# Patient Record
Sex: FEMALE | Race: WHITE | NOT HISPANIC OR LATINO | Employment: OTHER | URBAN - METROPOLITAN AREA
[De-identification: names, ages, dates, MRNs, and addresses within clinical notes are randomized per-mention and may not be internally consistent; named-entity substitution may affect disease eponyms.]

---

## 2017-01-16 ENCOUNTER — OFFICE VISIT (OUTPATIENT)
Dept: ORTHOPEDICS | Facility: CLINIC | Age: 82
End: 2017-01-16

## 2017-01-16 VITALS — BODY MASS INDEX: 23.29 KG/M2 | HEIGHT: 66 IN | WEIGHT: 144.9 LBS

## 2017-01-16 DIAGNOSIS — B35.1 DERMATOPHYTOSIS OF NAIL: Primary | ICD-10-CM

## 2017-01-16 NOTE — Clinical Note
1/16/2017       RE: Lynnette Mata  [address]  [place] [address]     Dear Colleague,    Thank you for referring your patient, Lynnette Mata, to the [facility] at [facility]. Please see a copy of my visit note below.    Date of Service: 1/16/2017    Chief Complaint:   Chief Complaint   Patient presents with     Consult     F/U Left great toe. Seen last in semptember 2016        HPI: Lynnette is a 81 year old female who presents today for further evaluation of thickened Left toenail. She has been using the Loprox cream intermittently. It was expensive. She doesn't think it has helped at all. She would like the nail trimmed today if possible. No other concerns.    Review of Systems: Denies N/V, fatigue    PMH:   Past Medical History   Diagnosis Date     Asymptomatic menopausal state age 45     Hx HRT use x 6+ years--off with [facility] study findings     Stricture and stenosis of cervix      Unspecified constipation      hx chronic     Shingles outbreak 2/14/2013     Despite immunization at age 72!          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[employer]     Social History Main Topics     Smoking status: Never Smoker      Smokeless tobacco: Never Used     Alcohol Use: 0.0 oz/week     0 Standard drinks or equivalent per week      Comment: 1 d; 3xs / week MAX,      Drug Use: No     Sexual Activity: No      Comment:  age 45     Other Topics Concern      Service No     Blood Transfusions No     Caffeine Concern No     Occupational Exposure No     Hobby Hazards No     Sleep Concern No     Stress Concern No     Weight Concern No     Special Diet No     Back  "Care No     Exercise No     Walks 3x weeks; 30 minutes     Bike Helmet No     Seat Belt No     Social History Narrative    How much exercise per week? Half hour everyday    How much calcium per day? Supplement       How much caffeine per day? 1 cup daily    How much vitamin D per day? MVI    Do you/your family wear seatbelts?  Yes    Do you/your family use safety helmets? No    Do you/your family use sunscreen? No    Do you/your family keep firearms in the home? No    Do you/your family have a smoke detector(s)? Yes        Do you feel safe in your home? Yes    Has anyone ever touched you in an unwanted manner? No     Explain     SEE ARYAN URIOSTEGUI    2/3/2014               FH:   Family History   Problem Relation Age of Onset     Myocardial Infarction Mother 74     Cancer - colorectal Maternal Aunt 68     CEREBROVASCULAR DISEASE Maternal Grandmother 74     Endocrine Disease Brother 10     Type I Diabetes Mellitus     C.A.D. Father      Psychotic Disorder Brother      bipolar     Psychotic Disorder Father 49     suicide     C.A.D. Brother      DIABETES Maternal Grandfather 65     DMII     Parkinsonism Other             Objective:   5' 6\" 144 lbs 14.4 oz  PT and DP pulses are 2/4 bilaterally. CRT is <3 seconds. Absent pedal hair.   Gross sensation is intact bilaterally.   Nails normal bilaterally, except for thickened and discolored L great toenail. No open lesions are noted. Skin is normal.    Assessment:   - Onychomycosis of L great toenail    Plan:  - Pt seen and evaluated  - Nail debrided upon consent. Small collection of serous fluid found subungually. All fluid was evacuated from beneath nail. Bacitracin and a bandaid applied to nailbed.  - Patient can continue to use the cream at her discretion. We discussed the low efficacy of loprox with thick nails. She is not interested in trying oral medication or having a nail avulsion performed. As long as the nail is short, she would like to keep it low to prevent " further fluid buildup.  - RTC as needed.          Again, thank you for allowing me to participate in the care of your patient.      Sincerely,    Efrain Barbosa DPM

## 2017-01-16 NOTE — PROGRESS NOTES
Date of Service: 1/16/2017    Chief Complaint:   Chief Complaint   Patient presents with     Consult     F/U Left great toe. Seen last in semptember 2016        HPI: Lynnette is a 81 year old female who presents today for further evaluation of thickened Left toenail. She has been using the Loprox cream intermittently. It was expensive. She doesn't think it has helped at all. She would like the nail trimmed today if possible. No other concerns.    Review of Systems: Denies N/V, fatigue    PMH:   Past Medical History   Diagnosis Date     Asymptomatic menopausal state age 45     Hx HRT use x 6+ years--off with [facility] study findings     Stricture and stenosis of cervix      Unspecified constipation      hx chronic     Shingles outbreak 2/14/2013     Despite immunization at age 72!          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[employer]     Social History Main Topics     Smoking status: Never Smoker      Smokeless tobacco: Never Used     Alcohol Use: 0.0 oz/week     0 Standard drinks or equivalent per week      Comment: 1 d; 3xs / week MAX,      Drug Use: No     Sexual Activity: No      Comment:  age 45     Other Topics Concern      Service No     Blood Transfusions No     Caffeine Concern No     Occupational Exposure No     Hobby Hazards No     Sleep Concern No     Stress Concern No     Weight Concern No     Special Diet No     Back Care No     Exercise No     Walks 3x weeks; 30 minutes     Bike Helmet No     Seat Belt No     Social History Narrative    How much exercise per week? Half hour everyday    How much calcium per day? Supplement       How much caffeine per day? 1 cup daily    How much vitamin D per day? MVI  "   Do you/your family wear seatbelts?  Yes    Do you/your family use safety helmets? No    Do you/your family use sunscreen? No    Do you/your family keep firearms in the home? No    Do you/your family have a smoke detector(s)? Yes        Do you feel safe in your home? Yes    Has anyone ever touched you in an unwanted manner? No     Explain     SEE ARYAN URIOSTEGUI    2/3/2014               FH:   Family History   Problem Relation Age of Onset     Myocardial Infarction Mother 74     Cancer - colorectal Maternal Aunt 68     CEREBROVASCULAR DISEASE Maternal Grandmother 74     Endocrine Disease Brother 10     Type I Diabetes Mellitus     C.A.D. Father      Psychotic Disorder Brother      bipolar     Psychotic Disorder Father 49     suicide     C.A.D. Brother      DIABETES Maternal Grandfather 65     DMII     Parkinsonism Other             Objective:   5' 6\" 144 lbs 14.4 oz  PT and DP pulses are 2/4 bilaterally. CRT is <3 seconds. Absent pedal hair.   Gross sensation is intact bilaterally.   Nails normal bilaterally, except for thickened and discolored L great toenail. No open lesions are noted. Skin is normal.    Assessment:   - Onychomycosis of L great toenail    Plan:  - Pt seen and evaluated  - Nail debrided upon consent. Small collection of serous fluid found subungually. All fluid was evacuated from beneath nail. Bacitracin and a bandaid applied to nailbed.  - Patient can continue to use the cream at her discretion. We discussed the low efficacy of loprox with thick nails. She is not interested in trying oral medication or having a nail avulsion performed. As long as the nail is short, she would like to keep it low to prevent further fluid buildup.  - RTC as needed.             Answers for HPI/ROS submitted by the patient on 1/16/2017   General Symptoms: No  Skin Symptoms: No  HENT Symptoms: No  EYE SYMPTOMS: No  HEART SYMPTOMS: No  LUNG SYMPTOMS: No  INTESTINAL SYMPTOMS: No  URINARY SYMPTOMS: No  GYNECOLOGIC " SYMPTOMS: No  BREAST SYMPTOMS: No  SKELETAL SYMPTOMS: No  BLOOD SYMPTOMS: No  NERVOUS SYSTEM SYMPTOMS: No  MENTAL HEALTH SYMPTOMS: No

## 2017-01-16 NOTE — NURSING NOTE
"Reason For Visit:   Chief Complaint   Patient presents with     Consult     F/U Left great toe. Seen last in semptember 2016       Primary MD: Nickolas Davenport  Ref. MD: Dr. Fitzgerald    ?  No      Ht 1.676 m (5' 6\")  Wt 65.726 kg (144 lb 14.4 oz)  BMI 23.40 kg/m2    Pain Assessment  Patient Currently in Pain: No           Current Outpatient Prescriptions   Medication     ciclopirox (LOPROX) 0.77 % cream     lisinopril (PRINIVIL,ZESTRIL) 5 MG tablet     simvastatin (ZOCOR) 10 MG tablet     Calcium Carbonate (CALCIUM 600 PO)     Glucosamine 500 MG CAPS     Multiple Vitamin (MULTI-VITAMIN) per tablet     fish oil-omega-3 fatty acids (FISH OIL) 1000 MG capsule     aspirin 81 MG chewable tablet     No current facility-administered medications for this visit.          Allergies   Allergen Reactions     Nkda [No Known Drug Allergies]        "

## 2017-05-01 ENCOUNTER — OFFICE VISIT (OUTPATIENT)
Dept: OBGYN | Facility: CLINIC | Age: 82
End: 2017-05-01
Attending: ADVANCED PRACTICE MIDWIFE
Payer: MEDICARE

## 2017-05-01 VITALS
BODY MASS INDEX: 23.45 KG/M2 | WEIGHT: 145.9 LBS | DIASTOLIC BLOOD PRESSURE: 63 MMHG | HEIGHT: 66 IN | SYSTOLIC BLOOD PRESSURE: 125 MMHG | HEART RATE: 69 BPM

## 2017-05-01 DIAGNOSIS — Z01.419 ENCOUNTER FOR GYNECOLOGICAL EXAMINATION WITHOUT ABNORMAL FINDING: ICD-10-CM

## 2017-05-01 ASSESSMENT — PAIN SCALES - GENERAL: PAINLEVEL: NO PAIN (0)

## 2017-05-01 NOTE — PROGRESS NOTES
Progress Note    SUBJECTIVE:  Lynnette Mata is an 81 year old  , who requests a breast and pelvic exam.    Patient is followed by Dr. Davenport for primary care.    Concerns today include: No concerns today. Feeling well. Has a PCP that she sees regularly. Would like breast exam and bimanual pelvic exam today.    Denies any vaginal bleeding, irritation, discomfort, change in discharge or odor. Denies urinary s/s.   Had mammogram today prior to appointment. Denies breast changes, masses or lumps.    -  has Parkinsons- living in a home. Pt tearful when discussing; states he is not doing well.   She is coping well and feels supported.    Menstrual History: Postmenopausal    Last  PAP:  NIL  History of abnormal Pap smear: NO - age 65 - see link Cervical Cytology Screening Guidelines  No further screening.    Mammogram current: yes- completed today.    Next colonoscopy when 83 y.o.    HISTORY:  Prescription Medications as of 2017             ciclopirox (LOPROX) 0.77 % cream Apply topically 2 times daily    lisinopril (PRINIVIL,ZESTRIL) 5 MG tablet Take 1 tablet (5 mg) by mouth daily    simvastatin (ZOCOR) 10 MG tablet Take 0.5 tablets (5 mg) by mouth daily    Calcium Carbonate (CALCIUM 600 PO) Take  by mouth daily.    Glucosamine 500 MG CAPS Take  by mouth daily.    Multiple Vitamin (MULTI-VITAMIN) per tablet Take 1 tablet by mouth daily.    fish oil-omega-3 fatty acids (FISH OIL) 1000 MG capsule Take 2 g by mouth daily.    aspirin 81 MG chewable tablet Take 81 mg by mouth daily.        Allergies   Allergen Reactions     Nkda [No Known Drug Allergies]      Immunization History   Administered Date(s) Administered     Influenza (H1N1) 2010     Influenza (High Dose) 3 valent vaccine 10/05/2015, 10/06/2016     Influenza (IIV3) 10/29/1997, 10/04/2010, 10/17/2011, 10/26/2012, 10/06/2013     Pneumococcal (PCV 13) 2015     Pneumococcal 23 valent 2008     TD (ADULT, 7+)  2006     TDAP Vaccine (Boostrix) 2012     Zoster vaccine, live 2007       Obstetric History       T0      TAB0   SAB0   E0   M0   L0      Past Medical History:   Diagnosis Date     Asymptomatic menopausal state age 45    Hx HRT use x 6+ years--off with  study findings     Shingles outbreak 2013    Despite immunization at age 72!        Stricture and stenosis of cervix      Unspecified constipation     hx chronic     Past Surgical History:   Procedure Laterality Date     COLONOSCOPY  4.15.09    (Fiberoptic) WNL -- ami in 10 yrs     Family History   Problem Relation Age of Onset     Myocardial Infarction Mother 74     CEREBROVASCULAR DISEASE Maternal Grandmother 74     Cancer - colorectal Maternal Aunt 68     Endocrine Disease Brother 10     Type I Diabetes Mellitus     Psychotic Disorder Brother      bipolar     C.A.D. Brother      C.A.D. 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     Social History Main Topics     Smoking status: Never Smoker     Smokeless tobacco: Never Used     Alcohol use 0.0 oz/week     0 Standard drinks or equivalent per week      Comment: 1 d; <3x week / week MAX,      Drug use: No     Sexual activity: No      Comment:  age 45     Other Topics Concern      Service No     Blood Transfusions No     Caffeine Concern No     Occupational Exposure No     Hobby Hazards No     Sleep Concern No     Stress Concern No     Weight Concern No     Special Diet No     Back Care No     Exercise No     Walks 3x weeks; 30 minutes     Bike Helmet No     Seat Belt No     Social History Narrative    How much exercise per week? Half hour  "everyday    How much calcium per day? Supplement       How much caffeine per day? 1 cup daily    How much vitamin D per day? MVI    Do you/your family wear seatbelts?  Yes    Do you/your family use safety helmets? No    Do you/your family use sunscreen? No    Do you/your family keep firearms in the home? No    Do you/your family have a smoke detector(s)? Yes        Do you feel safe in your home? Yes    Has anyone ever touched you in an unwanted manner? No     Explain     SEE ARYAN URIOSTEGUI    2/3/2014        Reviewed John KIM [facility]05/01/2017               ROS    EXAM:  Blood pressure 125/63, pulse 69, height 1.676 m (5' 5.98\"), weight 66.2 kg (145 lb 14.4 oz), not currently breastfeeding. Body mass index is 23.56 kg/(m^2).  General appearance: Pleasant female in no acute distress.     BREAST EXAM:  Breast: Without visible skin changes. No dimpling or lesions seen.   Breasts supple, non-tender with palpation, no dominant mass, nodularity, or nipple discharge noted bilaterally. Axillary nodes negative.      PELVIC EXAM:  EG/BUS: Normal genital architecture without lesions, erythema or abnormal secretions Bartholin's, Urethra, [place]'s normal   Urethral meatus: normal without lesion  Urethra: no masses, tenderness, or scarring   Bladder: no masses or tenderness   Vagina: moist, pink, rugae with odorless and physiologic discharge  Secretions  Speculum exam not performed  Cervix: closed, without lesion or CMT on bimanual exam  Uterus: anteverted,  and small, smooth, firm, mobile w/o pain  Adnexa: No masses, nodularity, tenderness; unable to palpate   Rectum:anus normal       ASSESSMENT:  Encounter Diagnosis   Name Primary?     Encounter for gynecological examination without abnormal finding       81 year old Female Pelvic and Breast Exam    PLAN:   Orders Placed This Encounter   Procedures     Pelvic and Breast Exam Procedure []       Return in one year/PRN for preventive care or problems/concerns.     Verbalized " understanding and agreement with visit plan.    LUIS Mattson, KIANM

## 2017-05-01 NOTE — LETTER
2017       RE: Lynnette Mata  [address]  [place] [address]     Dear Colleague,    Thank you for referring your patient, Lynnette Mata, to the [facility] at [facility]. Please see a copy of my visit note below.      Progress Note    SUBJECTIVE:  Lynnette Mata is an 81 year old  , who requests a breast and pelvic exam.    Patient is followed by Dr. Davenport for primary care.    Concerns today include: No concerns today. Feeling well. Has a PCP that she sees regularly. Would like breast exam and bimanual pelvic exam today.    Denies any vaginal bleeding, irritation, discomfort, change in discharge or odor. Denies urinary s/s.   Had mammogram today prior to appointment. Denies breast changes, masses or lumps.    -  has Parkinsons- living in a home. Pt tearful when discussing; states he is not doing well.   She is coping well and feels supported.    Menstrual History: Postmenopausal    Last  PAP:  NIL  History of abnormal Pap smear: NO - age 65 - see link Cervical Cytology Screening Guidelines  No further screening.    Mammogram current: yes- completed today.    Next colonoscopy when 83 y.o.    HISTORY:  Prescription Medications as of 2017             ciclopirox (LOPROX) 0.77 % cream Apply topically 2 times daily    lisinopril (PRINIVIL,ZESTRIL) 5 MG tablet Take 1 tablet (5 mg) by mouth daily    simvastatin (ZOCOR) 10 MG tablet Take 0.5 tablets (5 mg) by mouth daily    Calcium Carbonate (CALCIUM 600 PO) Take  by mouth daily.    Glucosamine 500 MG CAPS Take  by mouth daily.    Multiple Vitamin (MULTI-VITAMIN) per tablet Take 1 tablet by mouth daily.    fish oil-omega-3 fatty acids (FISH OIL) 1000 MG capsule Take 2 g by mouth daily.    aspirin 81 MG chewable tablet Take 81 mg by mouth daily.        Allergies   Allergen Reactions     Nkda [No Known Drug Allergies]      Immunization History   Administered  Date(s) Administered     Influenza (H1N1) 2010     Influenza (High Dose) 3 valent vaccine 10/05/2015, 10/06/2016     Influenza (IIV3) 10/29/1997, 10/04/2010, 10/17/2011, 10/26/2012, 10/06/2013     Pneumococcal (PCV 13) 2015     Pneumococcal 23 valent 2008     TD (ADULT, 7+) 2006     TDAP Vaccine (Boostrix) 2012     Zoster vaccine, live 2007       Obstetric History       T0      TAB0   SAB0   E0   M0   L0      Past Medical History:   Diagnosis Date     Asymptomatic menopausal state age 45    Hx HRT use x 6+ years--off with  study findings     Shingles outbreak 2013    Despite immunization at age 72!        Stricture and stenosis of cervix      Unspecified constipation     hx chronic     Past Surgical History:   Procedure Laterality Date     COLONOSCOPY  4.15.09    (Fiberoptic) WNL -- ami in 10 yrs     Family History   Problem Relation Age of Onset     Myocardial Infarction Mother 74     CEREBROVASCULAR DISEASE Maternal Grandmother 74     Cancer - colorectal Maternal Aunt 68     Endocrine Disease Brother 10     Type I Diabetes Mellitus     Psychotic Disorder Brother      bipolar     C.A.D. Brother      C.A.D. 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[employer]- , taaught childrens literature     Social History Main Topics     Smoking status: Never Smoker     Smokeless tobacco: Never Used     Alcohol use 0.0 oz/week     0 Standard drinks or equivalent per week      Comment: 1 d; <3x week / week MAX,      Drug use: No     Sexual activity: No      Comment:  age 45     Other Topics Concern      Service No     Blood Transfusions No     Caffeine Concern No      "Occupational Exposure No     Hobby Hazards No     Sleep Concern No     Stress Concern No     Weight Concern No     Special Diet No     Back Care No     Exercise No     Walks 3x weeks; 30 minutes     Bike Helmet No     Seat Belt No     Social History Narrative    How much exercise per week? Half hour everyday    How much calcium per day? Supplement       How much caffeine per day? 1 cup daily    How much vitamin D per day? MVI    Do you/your family wear seatbelts?  Yes    Do you/your family use safety helmets? No    Do you/your family use sunscreen? No    Do you/your family keep firearms in the home? No    Do you/your family have a smoke detector(s)? Yes        Do you feel safe in your home? Yes    Has anyone ever touched you in an unwanted manner? No     Explain     SEE ARYAN URIOSTEGUI    2/3/2014        Reviewed John KIM [facility]05/01/2017               ROS    EXAM:  Blood pressure 125/63, pulse 69, height 1.676 m (5' 5.98\"), weight 66.2 kg (145 lb 14.4 oz), not currently breastfeeding. Body mass index is 23.56 kg/(m^2).  General appearance: Pleasant female in no acute distress.     BREAST EXAM:  Breast: Without visible skin changes. No dimpling or lesions seen.   Breasts supple, non-tender with palpation, no dominant mass, nodularity, or nipple discharge noted bilaterally. Axillary nodes negative.      PELVIC EXAM:  EG/BUS: Normal genital architecture without lesions, erythema or abnormal secretions Bartholin's, Urethra, [place]'s normal   Urethral meatus: normal without lesion  Urethra: no masses, tenderness, or scarring   Bladder: no masses or tenderness   Vagina: moist, pink, rugae with odorless and physiologic discharge  Secretions  Speculum exam not performed  Cervix: closed, without lesion or CMT on bimanual exam  Uterus: anteverted,  and small, smooth, firm, mobile w/o pain  Adnexa: No masses, nodularity, tenderness; unable to palpate   Rectum:anus normal       ASSESSMENT:  Encounter Diagnosis   Name Primary?     " Encounter for gynecological examination without abnormal finding       81 year old Female Pelvic and Breast Exam    PLAN:   Orders Placed This Encounter   Procedures     Pelvic and Breast Exam Procedure []       Return in one year/PRN for preventive care or problems/concerns.     Verbalized understanding and agreement with visit plan.    Jaylen Rudolph, LUIS, CNM

## 2017-05-01 NOTE — MR AVS SNAPSHOT
After Visit Summary   5/1/2017    Lynnette Mata    MRN: [id number]           Patient Information     Date Of Birth          1935        Visit Information        Provider Department      5/1/2017 1:00 PM Jaylen Rudolph CNM [facility]        Today's Diagnoses     Encounter for gynecological examination without abnormal finding           Follow-ups after your visit        Follow-up notes from your care team     Return in 1 year (on 5/1/2018) for Preventative Health Visit.      Your next 10 appointments already scheduled     Aug 02, 2017  1:55 PM CDT   (Arrive by 1:40 PM)   PHYSICAL with Nickolas Davenport MD   [facility] ([facility] [place])    [address] [place] [address] [phone]              Who to contact     Please call your clinic at [phone] to:    Ask questions about your health    Make or cancel appointments    Discuss your medicines    Learn about your test results    Speak to your doctor   If you have compliments or concerns about an experience at your clinic, or if you wish to file a complaint, please contact [facility] Patient Relations at [phone] or email us at [email or web address]         Additional Information About Your Visit        MyChart Information     [employer] gives you secure access to your electronic health record. If you see a primary care provider, you can also send messages to your care team and make appointments. If you have questions, please call your primary care clinic.  If you do not have a primary care provider, please call [phone] and they will assist you.      [employer] is an electronic gateway that provides easy, online access to your medical records. With [employer], you can request a clinic appointment, read your test results, renew a prescription or communicate with your care team.     To access  "your existing account, please contact your [facility] or call [phone] for assistance.        Care EveryWhere ID     This is your Care EveryWhere ID. This could be used by other organizations to access your [place] medical records  [id number]        Your Vitals Were     Pulse Height BMI (Body Mass Index)             69 1.676 m (5' 5.98\") 23.56 kg/m2          Blood Pressure from Last 3 Encounters:   05/01/17 125/63   12/20/16 157/70   12/06/16 147/75    Weight from Last 3 Encounters:   05/01/17 66.2 kg (145 lb 14.4 oz)   01/16/17 65.7 kg (144 lb 14.4 oz)   12/06/16 66.5 kg (146 lb 11.2 oz)              We Performed the Following     Pelvic and Breast Exam Procedure []        Primary Care Provider Office Phone # Fax #    Nickolas Jose Francisco Davenport [phone]        PHYSICIANS [address] [facility] [address]  [facility] [place] [address]        Thank you!     Thank you for choosing [facility]  for your care. Our goal is always to provide you with excellent care. Hearing back from our patients is one way we can continue to improve our services. Please take a few minutes to complete the written survey that you may receive in the mail after your visit with us. Thank you!             Your Updated Medication List - Protect others around you: Learn how to safely use, store and throw away your medicines at www.disposemymeds.org.          This list is accurate as of: 5/1/17  1:53 PM.  Always use your most recent med list.                   Brand Name Dispense Instructions for use    aspirin 81 MG chewable tablet      Take 81 mg by mouth daily.       CALCIUM 600 PO      Take  by mouth daily.       ciclopirox 0.77 % cream    LOPROX    90 g    Apply topically 2 times daily       fish oil-omega-3 fatty acids 1000 MG capsule      Take 2 g by mouth daily.       glucosamine 500 MG Caps      Take  by mouth daily.       lisinopril 5 MG tablet    PRINIVIL/ZESTRIL    90 " tablet    Take 1 tablet (5 mg) by mouth daily       Multi-vitamin Tabs tablet   Generic drug:  multivitamin, therapeutic with minerals      Take 1 tablet by mouth daily.       simvastatin 10 MG tablet    ZOCOR    45 tablet    Take 0.5 tablets (5 mg) by mouth daily

## 2017-06-27 ENCOUNTER — OFFICE VISIT (OUTPATIENT)
Dept: ORTHOPEDICS | Facility: CLINIC | Age: 82
End: 2017-06-27

## 2017-06-27 DIAGNOSIS — B35.1 DERMATOPHYTOSIS OF NAIL: Primary | ICD-10-CM

## 2017-06-27 DIAGNOSIS — I73.9 PVD (PERIPHERAL VASCULAR DISEASE) (H): ICD-10-CM

## 2017-06-27 NOTE — MR AVS SNAPSHOT
After Visit Summary   6/27/2017    Lynnette Mata    MRN: [id number]           Patient Information     Date Of Birth          1935        Visit Information        Provider Department      6/27/2017 4:40 PM Efrain Barbosa DPM [facility]        Today's Diagnoses     Dermatophytosis of nail    -  1    PVD (peripheral vascular disease) (H)           Follow-ups after your visit        Follow-up notes from your care team     Return in about 6 months (around 12/27/2017).      Your next 10 appointments already scheduled     Aug 02, 2017  1:55 PM CDT   (Arrive by 1:40 PM)   PHYSICAL with Nickolas Davenport MD   [facility] ([facility] [place])    [address] [address] [phone]              Who to contact     Please call your clinic at [phone] to:    Ask questions about your health    Make or cancel appointments    Discuss your medicines    Learn about your test results    Speak to your doctor   If you have compliments or concerns about an experience at your clinic, or if you wish to file a complaint, please contact [facility] Patient Relations at [phone] or email us at [email or web address]         Additional Information About Your Visit        MyChart Information     [employer] gives you secure access to your electronic health record. If you see a primary care provider, you can also send messages to your care team and make appointments. If you have questions, please call your primary care clinic.  If you do not have a primary care provider, please call [phone] and they will assist you.      [employer] is an electronic gateway that provides easy, online access to your medical records. With [employer], you can request a clinic appointment, read your test results, renew a prescription or communicate with your care team.     To access your existing account,  please contact your [facility] or call [phone] for assistance.        Care EveryWhere ID     This is your Care EveryWhere ID. This could be used by other organizations to access your [place] medical records  [id number]         Blood Pressure from Last 3 Encounters:   05/01/17 125/63   12/20/16 157/70   12/06/16 147/75    Weight from Last 3 Encounters:   05/01/17 66.2 kg (145 lb 14.4 oz)   01/16/17 65.7 kg (144 lb 14.4 oz)   12/06/16 66.5 kg (146 lb 11.2 oz)              We Performed the Following     DEBRIDEMENT OF NAIL(S), 1-5        Primary Care Provider Office Phone # Fax #    Nickolas Jose Francisco Davenport [phone]        PHYSICIANS [address] [place] [address]        Equal Access to Services     WENDIE [facility] HospitalLAZARA : Hadii aad ku hadasho Soakin, waaxda luqadaha, qaybta kaalmada adeegyada, katia lopez hayphuong alonzo . So [facility] [phone].    ATENCIÓN: Si habla español, tiene a calabrese disposición servicios gratuitos de asistencia lingüística. Andria al [phone].    We comply with applicable federal civil rights laws and [place] laws. We do not discriminate on the basis of race, color, national origin, age, disability sex, sexual orientation or gender identity.            Thank you!     Thank you for choosing [facility]  for your care. Our goal is always to provide you with excellent care. Hearing back from our patients is one way we can continue to improve our services. Please take a few minutes to complete the written survey that you may receive in the mail after your visit with us. Thank you!             Your Updated Medication List - Protect others around you: Learn how to safely use, store and throw away your medicines at www.disposemymeds.org.          This list is accurate as of: 6/27/17  5:31 PM.  Always use your most recent med list.                   Brand Name Dispense Instructions for use Diagnosis     aspirin 81 MG chewable tablet      Take 81 mg by mouth daily.        CALCIUM 600 PO      Take  by mouth daily.        ciclopirox 0.77 % cream    LOPROX    90 g    Apply topically 2 times daily    Dermatophytosis of nail       fish oil-omega-3 fatty acids 1000 MG capsule      Take 2 g by mouth daily.        glucosamine 500 MG Caps      Take  by mouth daily.        lisinopril 5 MG tablet    PRINIVIL/ZESTRIL    90 tablet    Take 1 tablet (5 mg) by mouth daily    Essential hypertension, benign       Multi-vitamin Tabs tablet   Generic drug:  multivitamin, therapeutic with minerals      Take 1 tablet by mouth daily.        simvastatin 10 MG tablet    ZOCOR    45 tablet    Take 0.5 tablets (5 mg) by mouth daily    Hyperlipidemia

## 2017-06-27 NOTE — LETTER
6/27/2017       RE: Lynnette Mata  [address] [place] [address]     Dear Colleague,    Thank you for referring your patient, Lynnette Mata, to the [facility] at [facility]. Please see a copy of my visit note below.    Chief Complaint:   Chief Complaint   Patient presents with     RECHECK     Left great toenail. Pt stated that she has not been using the cream.           Allergies   Allergen Reactions     Nkda [No Known Drug Allergies]          Subjective: Lynnette is a 81 year old female who presents to the clinic today for a follow up of left big toenail thickness. She relates that she would still like the nail ground down. She relates pain when wearing tight shoes, so she wears comfortable ones instead.     Objective    PT and DP pulses are 1/4 bilaterally. CRT is <3 seconds. Absent pedal hair.   Gross sensation is intact bilaterally.   Nails normal bilaterally, except for thickened and discolored L great toenail. No open lesions are noted. Skin is normal.    Assessment: Onychomycosis x 1  PVD    Plan:   - Pt seen and evaluated  - Left hallux nail was sharply debrided.  - Pt to return to clinic PRN    Again, thank you for allowing me to participate in the care of your patient.      Sincerely,    Efrain Barbosa DPM

## 2017-06-27 NOTE — NURSING NOTE
Reason For Visit:   Chief Complaint   Patient presents with     RECHECK     Left great toenail. Pt stated that she has not been using the cream.        Pain Assessment  Patient Currently in Pain: Denies          Current Outpatient Prescriptions   Medication Sig Dispense Refill     ciclopirox (LOPROX) 0.77 % cream Apply topically 2 times daily (Patient not taking: Reported on 5/1/2017) 90 g 6     lisinopril (PRINIVIL,ZESTRIL) 5 MG tablet Take 1 tablet (5 mg) by mouth daily 90 tablet 3     simvastatin (ZOCOR) 10 MG tablet Take 0.5 tablets (5 mg) by mouth daily 45 tablet 3     Calcium Carbonate (CALCIUM 600 PO) Take  by mouth daily.       Glucosamine 500 MG CAPS Take  by mouth daily.       Multiple Vitamin (MULTI-VITAMIN) per tablet Take 1 tablet by mouth daily.       fish oil-omega-3 fatty acids (FISH OIL) 1000 MG capsule Take 2 g by mouth daily.       aspirin 81 MG chewable tablet Take 81 mg by mouth daily.            Allergies   Allergen Reactions     Nkda [No Known Drug Allergies]

## 2017-06-27 NOTE — PROGRESS NOTES
Chief Complaint:   Chief Complaint   Patient presents with     RECHECK     Left great toenail. Pt stated that she has not been using the cream.           Allergies   Allergen Reactions     Nkda [No Known Drug Allergies]          Subjective: Lynnette is a 81 year old female who presents to the clinic today for a follow up of left big toenail thickness. She relates that she would still like the nail ground down. She relates pain when wearing tight shoes, so she wears comfortable ones instead.     Objective    PT and DP pulses are 1/4 bilaterally. CRT is <3 seconds. Absent pedal hair.   Gross sensation is intact bilaterally.   Nails normal bilaterally, except for thickened and discolored L great toenail. No open lesions are noted. Skin is normal.    Assessment: Onychomycosis x 1  PVD    Plan:   - Pt seen and evaluated  - Left hallux nail was sharply debrided.  - Pt to return to clinic PRN

## 2017-08-02 ENCOUNTER — OFFICE VISIT (OUTPATIENT)
Dept: INTERNAL MEDICINE | Facility: CLINIC | Age: 82
End: 2017-08-02

## 2017-08-02 VITALS
WEIGHT: 148 LBS | RESPIRATION RATE: 16 BRPM | DIASTOLIC BLOOD PRESSURE: 74 MMHG | SYSTOLIC BLOOD PRESSURE: 119 MMHG | BODY MASS INDEX: 23.9 KG/M2 | HEART RATE: 59 BPM

## 2017-08-02 DIAGNOSIS — Z71.84 TRAVEL ADVICE ENCOUNTER: Primary | ICD-10-CM

## 2017-08-02 DIAGNOSIS — Z00.00 ROUTINE GENERAL MEDICAL EXAMINATION AT A HEALTH CARE FACILITY: ICD-10-CM

## 2017-08-02 DIAGNOSIS — E78.5 HYPERLIPIDEMIA LDL GOAL <100: ICD-10-CM

## 2017-08-02 DIAGNOSIS — I10 BENIGN ESSENTIAL HYPERTENSION: ICD-10-CM

## 2017-08-02 RX ORDER — ATOVAQUONE AND PROGUANIL HYDROCHLORIDE 250; 100 MG/1; MG/1
1 TABLET, FILM COATED ORAL DAILY
Qty: 30 TABLET | Refills: 0 | Status: SHIPPED | OUTPATIENT
Start: 2017-08-02 | End: 2017-09-01

## 2017-08-02 ASSESSMENT — PAIN SCALES - GENERAL: PAINLEVEL: NO PAIN (0)

## 2017-08-02 NOTE — PATIENT INSTRUCTIONS
Primary Care Center Medication Refill Request Information:  * Please contact your pharmacy regarding ANY request for medication refills.  ** [facility] Prescription Fax = [phone]  * Please allow 3 business days for routine medication refills.  * Please allow 5 business days for controlled substance medication refills.     Primary Care Center Test Result notification information:  *You will be notified within 7-10 days of your appointment day regarding the results of your test.  If you are on MyChart you will be notified as soon as the provider has reviewed the results and signed off on them.

## 2017-08-02 NOTE — NURSING NOTE
Chief Complaint   Patient presents with     Physical     Pt is here for a physical.      Lyudmila Wise LPN August 2, 2017 2:03 PM

## 2017-08-02 NOTE — MR AVS SNAPSHOT
After Visit Summary   8/2/2017    Lynnette Mata    MRN: [id number]           Patient Information     Date Of Birth          1935        Visit Information        Provider Department      8/2/2017 1:55 PM Nickolas Davenport MD [facility]        Today's Diagnoses     Travel advice encounter    -  1    Routine general medical examination at a health care facility        Hyperlipidemia LDL goal <100        Benign essential hypertension          Care Instructions    Primary Care Center Medication Refill Request Information:  * Please contact your pharmacy regarding ANY request for medication refills.  ** [facility] Prescription Fax = [phone]  * Please allow 3 business days for routine medication refills.  * Please allow 5 business days for controlled substance medication refills.     Primary Care Center Test Result notification information:  *You will be notified within 7-10 days of your appointment day regarding the results of your test.  If you are on MyChart you will be notified as soon as the provider has reviewed the results and signed off on them.            Follow-ups after your visit        Who to contact     Please call your clinic at [phone] to:    Ask questions about your health    Make or cancel appointments    Discuss your medicines    Learn about your test results    Speak to your doctor   If you have compliments or concerns about an experience at your clinic, or if you wish to file a complaint, please contact [facility] Patient Relations at [phone] or email us at [email or web address]         Additional Information About Your Visit        MyChart Information     [email or web address] gives you secure access to your electronic health record. If you see a primary care provider, you can also send messages to your care team and make appointments. If you have questions, please call your primary care clinic.  If you do not have a  primary care provider, please call [phone] and they will assist you.      [employer] is an electronic gateway that provides easy, online access to your medical records. With [employer] you can request a clinic appointment, read your test results, renew a prescription or communicate with your care team.     To access your existing account, please contact your [facility] or call [phone] for assistance.        Care EveryWhere ID     This is your Care EveryWhere ID. This could be used by other organizations to access your [place] medical records  [id number]        Your Vitals Were     Pulse Respirations BMI (Body Mass Index)             59 16 23.9 kg/m2          Blood Pressure from Last 3 Encounters:   No data found for BP    Weight from Last 3 Encounters:   No data found for Wt              Today, you had the following     No orders found for display         Today's Medication Changes          These changes are accurate as of: 8/2/17 11:59 PM.  If you have any questions, ask your nurse or doctor.               Start taking these medicines.        Dose/Directions    atovaquone-proguanil 250-100 MG per tablet   Commonly known as:  MALARONE   Used for:  Travel advice encounter   Started by:  Nickolas Davenport MD        Dose:  1 tablet   Take 1 tablet by mouth daily Start 2 days before travel and continue 7 days after return.   Quantity:  30 tablet   Refills:  0            Where to get your medicines      These medications were sent to [facility] [address] [place] [address] AT [address] [address]     Phone:  [phone]     atovaquone-proguanil 250-100 MG per tablet                Primary Care Provider Office Phone # Fax #    Nickolas Davenport [phone]       [address] [place] [address]  [facility] [address]        Equal Access to Services     WENDIE BRAUN AH: joshua Nesbitt  jaelkuldip maldonado waxay idiin hayaan adeeg kharash la'aan ah. So [facility] [phone].    ATENCIÓN: Si denise savage, tiene a calabrese disposición servicios gratuitos de asistencia lingüística. Andria al [phone].    We comply with applicable federal civil rights laws and [place] laws. We do not discriminate on the basis of race, color, national origin, age, disability sex, sexual orientation or gender identity.            Thank you!     Thank you for choosing [facility]  for your care. Our goal is always to provide you with excellent care. Hearing back from our patients is one way we can continue to improve our services. Please take a few minutes to complete the written survey that you may receive in the mail after your visit with us. Thank you!             Your Updated Medication List - Protect others around you: Learn how to safely use, store and throw away your medicines at www.disposemymeds.org.          This list is accurate as of: 8/2/17 11:59 PM.  Always use your most recent med list.                   Brand Name Dispense Instructions for use Diagnosis    aspirin 81 MG chewable tablet      Take 81 mg by mouth daily.        atovaquone-proguanil 250-100 MG per tablet    MALARONE    30 tablet    Take 1 tablet by mouth daily Start 2 days before travel and continue 7 days after return.    Travel advice encounter       CALCIUM 600 PO      Take  by mouth daily.        ciclopirox 0.77 % cream    LOPROX    90 g    Apply topically 2 times daily    Dermatophytosis of nail       fish oil-omega-3 fatty acids 1000 MG capsule      Take 2 g by mouth daily.        glucosamine 500 MG Caps      Take  by mouth daily.        Multi-vitamin Tabs tablet   Generic drug:  multivitamin, therapeutic with minerals      Take 1 tablet by mouth daily.

## 2017-08-02 NOTE — PROGRESS NOTES
SUBJECTIVE:  Lynnette Mata is a 81 year old female presents for   Chief Complaint   Patient presents with     Physical     Pt is here for a physical.       Ms. Mata is doing well. She lost her  this May. He had Parkinson's with Lewy Body dementia. Through the grieving process. Had ED evalutation for chest pain and subsequent CT angiogram showed a score of 0- lowest risk for age. Walking daily without problems. [place]- mainly in [place] but will go below 8200 ft which carries malaria risk. NOT during meningitis season. Some Yellow fever risk. Hep A endemic as is Typhoid. Will not have full immunity after vaccinations given timing of trip but better now than none at all. DIscussed this.     Medications and allergies were reviewed by me today.     Review Of Systems    Constitutional: no fevers, chills, night sweats or unintentional weight change   Eyes: no vision change, diplopia or red eyes   Ears, Nose, Mouth, Throat: no tinnitus or hearing change, no epistaxis or nasal discharge, no oral lesions, throat clear   Cardiovascular: no chest pain, palpitations, or pain with walking, no orthopnea or PND   Respiratory: no dyspnea, cough, shortness of breath or wheezing   GI: no nausea, vomiting, diarrhea or constipation, no abdominal pain   : no change in urine, no dysuria or hematuria  Musculoskeletal: no joint or muscle pain or swelling   Integumentary: no concerning lesions or moles   Neuro: no loss of strength or sensation, no numbness or tingling, no tremor, no dizziness, no headache   Endo: no polyuria or polydipsia, no temperature intolerance   Heme/Lymph: no concerning bumps, no bleeding problems   Allergy: no environmental allergies   Psych: no depression or anxiety, no sleep problems    Current Outpatient Prescriptions   Medication     atovaquone-proguanil (MALARONE) 250-100 MG per tablet     ciclopirox (LOPROX) 0.77 % cream     lisinopril (PRINIVIL,ZESTRIL)  5 MG tablet     simvastatin (ZOCOR) 10 MG tablet     Calcium Carbonate (CALCIUM 600 PO)     Glucosamine 500 MG CAPS     Multiple Vitamin (MULTI-VITAMIN) per tablet     fish oil-omega-3 fatty acids (FISH OIL) 1000 MG capsule     aspirin 81 MG chewable tablet     No current facility-administered medications for this visit.      Family history reviewed and not contributory/pertinent today given her wellness and age  SH- no tobacco, recent loss of her , Rare glass of wine.     PHYSICAL EXAM:  /68  Pulse 59  Resp 16  Wt 67.1 kg (148 lb)  BMI 23.9 kg/m2      Constitutional: no distress, comfortable, pleasant   Eyes: anicteric, normal extra-ocular movements   Ears, Nose and Throat: tympanic membranes clear, nose clear and free of lesions, throat clear, neck supple with full range of motion, no thyromegaly.   Cardiovascular: regular rate and rhythm, normal S1 and S2, no murmurs, rubs or gallops, peripheral pulses full and symmetric   Respiratory: clear to auscultation, no wheezes or crackles, normal breath sounds   Gastrointestinal: positive bowel sounds, nontender, no hepatosplenomegaly, no masses   Musculoskeletal: full range of motion, no edema   Skin: no concerning lesions, no jaundice   Neurological: nl coordination and mentation, normal gait, no tremor   Psychological: appropriate mood   Lymphatic: no cervical, axillary or inguinal lymphadenopathy      ASSESSMENT/PLAN:  1) HCM. Gyn health UTD- no more paps, mammo nl. Routine immunizations UTD but needs travel shots. Excellent functional status. Low fall risk.   2) Travel. Late in getting evaluated. Will be in [place] [place] but will also travel below 8200 ft outside town so will need malarial prophylaxis with malarone (instructions given). Yellow Fever vaccine recommended but shortage noted- she will call Lissy to see if available. Will get Typhoid and Hep A vaccine as well. Handout from [facility] website given.   3) Hyperlipidemia. Calcium  score 0 so 10 yr ascvd risk low. No SEs on statin low dose so will not change. Lipids ordered.  4) HTN. BP elevated today- will recheck using 3 measurement protocol and continue lisinopril 5 mg/d. NO change given current guidelines targeting <150/90 in her age group.       Nickolas Davenport MD, FACP, FAAP

## 2017-08-03 DIAGNOSIS — Z00.00 ROUTINE GENERAL MEDICAL EXAMINATION AT A HEALTH CARE FACILITY: ICD-10-CM

## 2017-08-03 DIAGNOSIS — I10 BENIGN ESSENTIAL HYPERTENSION: ICD-10-CM

## 2017-08-03 DIAGNOSIS — E78.5 HYPERLIPIDEMIA LDL GOAL <100: ICD-10-CM

## 2017-08-03 LAB
ALBUMIN SERPL-MCNC: 3.4 G/DL (ref 3.4–5)
ALP SERPL-CCNC: 41 U/L (ref 40–150)
ALT SERPL W P-5'-P-CCNC: 26 U/L (ref 0–50)
ANION GAP SERPL CALCULATED.3IONS-SCNC: 8 MMOL/L (ref 3–14)
AST SERPL W P-5'-P-CCNC: 17 U/L (ref 0–45)
BILIRUB SERPL-MCNC: 0.5 MG/DL (ref 0.2–1.3)
BUN SERPL-MCNC: 20 MG/DL (ref 7–30)
CALCIUM SERPL-MCNC: 8.8 MG/DL (ref 8.5–10.1)
CHLORIDE SERPL-SCNC: 105 MMOL/L (ref 94–109)
CHOLEST SERPL-MCNC: 200 MG/DL
CO2 SERPL-SCNC: 26 MMOL/L (ref 20–32)
CREAT SERPL-MCNC: 0.7 MG/DL (ref 0.52–1.04)
GFR SERPL CREATININE-BSD FRML MDRD: 81 ML/MIN/1.7M2
GLUCOSE SERPL-MCNC: 96 MG/DL (ref 70–99)
HDLC SERPL-MCNC: 101 MG/DL
LDLC SERPL CALC-MCNC: 87 MG/DL
NONHDLC SERPL-MCNC: 99 MG/DL
POTASSIUM SERPL-SCNC: 4.1 MMOL/L (ref 3.4–5.3)
PROT SERPL-MCNC: 6.8 G/DL (ref 6.8–8.8)
SODIUM SERPL-SCNC: 139 MMOL/L (ref 133–144)
TRIGL SERPL-MCNC: 62 MG/DL
TSH SERPL DL<=0.005 MIU/L-ACNC: 2.93 MU/L (ref 0.4–4)

## 2017-08-08 DIAGNOSIS — I10 ESSENTIAL HYPERTENSION, BENIGN: ICD-10-CM

## 2017-08-08 DIAGNOSIS — E78.5 HYPERLIPIDEMIA: ICD-10-CM

## 2017-08-08 RX ORDER — LISINOPRIL 5 MG/1
5 TABLET ORAL DAILY
Qty: 90 TABLET | Refills: 3 | Status: SHIPPED | OUTPATIENT
Start: 2017-08-08 | End: 2018-02-26

## 2017-08-08 RX ORDER — SIMVASTATIN 10 MG
5 TABLET ORAL DAILY
Qty: 45 TABLET | Refills: 3 | Status: SHIPPED | OUTPATIENT
Start: 2017-08-08 | End: 2017-10-04

## 2017-08-08 NOTE — TELEPHONE ENCOUNTER
lisinopril (PRINIVIL,ZESTRIL) 5 MG tablet      Last Written Prescription Date:  8/9/2016  Last Fill Quantity: 90,   # refills: 3    Potassium   Date Value Ref Range Status   08/03/2017 4.1 3.4 - 5.3 mmol/L Final   ]  Creatinine   Date Value Ref Range Status   08/03/2017 0.70 0.52 - 1.04 mg/dL Final   ]  BP Readings from Last 1 Encounters:   08/02/17 119/74       simvastatin (ZOCOR) 10 MG tablet      Last Written Prescription Date:  7/20/2016  Last Fill Quantity: 45,   # refills: 3      Last Office Visit : 8/2/2017  Future Office visit:  0

## 2017-09-08 ENCOUNTER — OFFICE VISIT (OUTPATIENT)
Dept: FAMILY MEDICINE | Facility: CLINIC | Age: 82
End: 2017-09-08

## 2017-09-08 VITALS
SYSTOLIC BLOOD PRESSURE: 129 MMHG | HEIGHT: 67 IN | OXYGEN SATURATION: 95 % | HEART RATE: 73 BPM | DIASTOLIC BLOOD PRESSURE: 74 MMHG | WEIGHT: 145 LBS | BODY MASS INDEX: 22.76 KG/M2 | TEMPERATURE: 98.2 F

## 2017-09-08 DIAGNOSIS — N30.01 ACUTE CYSTITIS WITH HEMATURIA: Primary | ICD-10-CM

## 2017-09-08 DIAGNOSIS — R30.0 DYSURIA: ICD-10-CM

## 2017-09-08 RX ORDER — SULFAMETHOXAZOLE/TRIMETHOPRIM 800-160 MG
1 TABLET ORAL 2 TIMES DAILY
Qty: 14 TABLET | Refills: 0 | Status: SHIPPED | OUTPATIENT
Start: 2017-09-08 | End: 2017-10-04

## 2017-09-08 RX ORDER — SULFAMETHOXAZOLE/TRIMETHOPRIM 800-160 MG
1 TABLET ORAL 2 TIMES DAILY
Qty: 14 TABLET | Refills: 0 | Status: CANCELLED | OUTPATIENT
Start: 2017-09-08

## 2017-09-08 ASSESSMENT — PAIN SCALES - GENERAL: PAINLEVEL: NO PAIN (0)

## 2017-09-08 NOTE — MR AVS SNAPSHOT
"              After Visit Summary   9/8/2017    Lynnette Mata    MRN: [id number]           Patient Information     Date Of Birth          1935        Visit Information        Provider Department      9/8/2017 9:00 AM Elizabeth Panchal NP [facility]        Today's Diagnoses     Urinary tract infection    -  1    Dysuria           Follow-ups after your visit        Who to contact     Please call your clinic at [phone] to:    Ask questions about your health    Make or cancel appointments    Discuss your medicines    Learn about your test results    Speak to your doctor   If you have compliments or concerns about an experience at your clinic, or if you wish to file a complaint, please contact [facility] Patient Relations at [phone] or email us at [email or web address]         Additional Information About Your Visit        MyChart Information     SmartSyncht gives you secure access to your electronic health record. If you see a primary care provider, you can also send messages to your care team and make appointments. If you have questions, please call your primary care clinic.  If you do not have a primary care provider, please call [phone] and they will assist you.      Youneeq is an electronic gateway that provides easy, online access to your medical records. With Youneeq, you can request a clinic appointment, read your test results, renew a prescription or communicate with your care team.     To access your existing account, please contact your [facility] or call [phone] for assistance.        Care EveryWhere ID     This is your Care EveryWhere ID. This could be used by other organizations to access your [place] medical records  [id number]        Your Vitals Were     Pulse Temperature Height Pulse Oximetry Breastfeeding? BMI (Body Mass Index)    73 98.2  F (36.8  C) (Oral) 5' 6.7\" (169.4 cm) 95% No 22.91 kg/m2 "       Blood Pressure from Last 3 Encounters:   09/08/17 129/74   08/02/17 119/74   05/01/17 125/63    Weight from Last 3 Encounters:   09/08/17 145 lb (65.8 kg)   08/02/17 148 lb (67.1 kg)   05/01/17 145 lb 14.4 oz (66.2 kg)              Today, you had the following     No orders found for display       Primary Care Provider Office Phone # Fax Vidhya Olmos Jose Francisco Davenport [phone]       [address] [place] [address]        Equal Access to Services     [facility]: Hadii sal nuñez hadasho Soakin, waaxda luqadaha, qaybta kaalmada azam, katia alonzo . So [facility] [phone].    ATENCIÓN: Si habla español, tiene a calabrese disposición servicios gratuitos de asistencia lingüística. [facility] [phone].    We comply with applicable federal civil rights laws and [place] laws. We do not discriminate on the basis of race, color, national origin, age, disability sex, sexual orientation or gender identity.            Thank you!     Thank you for choosing [facility]  for your care. Our goal is always to provide you with excellent care. Hearing back from our patients is one way we can continue to improve our services. Please take a few minutes to complete the written survey that you may receive in the mail after your visit with us. Thank you!             Your Updated Medication List - Protect others around you: Learn how to safely use, store and throw away your medicines at www.disposemymeds.org.          This list is accurate as of: 9/8/17 10:29 AM.  Always use your most recent med list.                   Brand Name Dispense Instructions for use Diagnosis    aspirin 81 MG chewable tablet      Take 81 mg by mouth daily.        CALCIUM 600 PO      Take  by mouth daily.        ciclopirox 0.77 % cream    LOPROX    90 g    Apply topically 2 times daily    Dermatophytosis of nail       fish oil-omega-3 fatty acids 1000 MG capsule      Take 2 g by mouth daily.         glucosamine 500 MG Caps      Take  by mouth daily.        lisinopril 5 MG tablet    PRINIVIL/ZESTRIL    90 tablet    Take 1 tablet (5 mg) by mouth daily    Essential hypertension, benign       Multi-vitamin Tabs tablet   Generic drug:  multivitamin, therapeutic with minerals      Take 1 tablet by mouth daily.        simvastatin 10 MG tablet    ZOCOR    45 tablet    Take 0.5 tablets (5 mg) by mouth daily    Hyperlipidemia

## 2017-09-08 NOTE — PROGRESS NOTES
Lynnette Mata is a 82 year old female who presents today for a 7 day history of dysuria.  She describes frequency, urgency and burning of urination.  She does not get these infections, she looked this up on line and thinks it fits for her symptoms.    Lynnette has recently returned from [place] where she had various opportunities for both bathroom stops and hygiene.  There was a day when she was not able to empty her bladder for 11 hours, the facilities were sometimes not as clean as she is used to.  She was very careful to be respectful of the culture she visited, she was just describing the situation.    Review Of Systems  Skin: negative  Eyes: negative  Ears/Nose/Throat: negative  Respiratory: No shortness of breath, dyspnea on exertion, cough, or hemoptysis  Cardiovascular:  History of hypertension and hyperlipidemia  Gastrointestinal: negative  Genitourinary: negative  Musculoskeletal: muscular weakness, lower extremities, no definitive diagnosis, right knee bothers her, she believes all of the above is arthritis  Neurologic: negative  Psychiatric: negative,   May of 2017, Parkinsons, haider body dementia  Hematologic/Lymphatic/Immunologic: negative  Endocrine: negative    Past Medical History:   Diagnosis Date     Asymptomatic menopausal state age 45    Hx HRT use x 6+ years--off with [facility] study findings     Shingles outbreak 2013    Despite immunization at age 72!        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[employer]- [employer]     Social History Main Topics     Smoking status: Never Smoker     Smokeless tobacco:  "Never Used     Alcohol use 0.0 oz/week     0 Standard drinks or equivalent per week      Comment: 1 d; <3x week / week MAX,      Drug use: No     Sexual activity: No      Comment:  age 45     Other Topics Concern      Service No     Blood Transfusions No     Caffeine Concern No     Occupational Exposure No     Hobby Hazards No     Sleep Concern No     Stress Concern No     Weight Concern No     Special Diet No     Back Care No     Exercise No     Walks 3x weeks; 30 minutes     Bike Helmet No     Seat Belt No     Social History Narrative    How much exercise per week? Half hour everyday    How much calcium per day? Supplement       How much caffeine per day? 1 cup daily    How much vitamin D per day? MVI    Do you/your family wear seatbelts?  Yes    Do you/your family use safety helmets? No    Do you/your family use sunscreen? No    Do you/your family keep firearms in the home? No    Do you/your family have a smoke detector(s)? Yes        Do you feel safe in your home? Yes    Has anyone ever touched you in an unwanted manner? No     Explain     SEE ARYAN URIOSTEGUI    2/3/2014        Reviewed [facility]05/01/2017             Family History   Problem Relation Age of Onset     Myocardial Infarction Mother 74     CEREBROVASCULAR DISEASE Maternal Grandmother 74     Cancer - colorectal Maternal Aunt 68     Endocrine Disease Brother 10     Type I Diabetes Mellitus     Psychotic Disorder Brother      bipolar     C.A.D. Brother      C.A.D. Father      Psychotic Disorder Father 49     suicide     DIABETES Maternal Grandfather 65     DMII     Parkinsonism Other             /74 (BP Location: Right arm, Patient Position: Chair, Cuff Size: Adult Regular)  Pulse 73  Temp 98.2  F (36.8  C) (Oral)  Ht 5' 6.7\" (169.4 cm)  Wt 145 lb (65.8 kg)  SpO2 95%  Breastfeeding? No  BMI 22.91 kg/m2    Exam:  Constitutional: healthy, alert and no distress  Gastrointestinal: Abdomen soft, non-tender. BS normal. No masses, " organomegaly  : microscopic hematuria, UTI  Musculoskeletal: extremities normal- no gross deformities noted, gait normal and normal muscle tone  Skin: no suspicious lesions or rashes  Neurologic: gait slower and somewhat uneven  Psychiatric: mentation appears normal and affect normal/bright      Assessment/Plan:  1. Acute cystitis with hematuria    - sulfamethoxazole-trimethoprim (BACTRIM DS/SEPTRA DS) 800-160 MG per tablet; Take 1 tablet by mouth 2 times daily  Dispense: 14 tablet; Refill: 0  Increase fluids, use OTC urispas, RTC prn    2. Dysuria

## 2017-09-09 LAB
BACTERIA SPEC CULT: ABNORMAL
Lab: ABNORMAL
SPECIMEN SOURCE: ABNORMAL

## 2017-10-04 ENCOUNTER — OFFICE VISIT (OUTPATIENT)
Dept: INTERNAL MEDICINE | Facility: CLINIC | Age: 82
End: 2017-10-04

## 2017-10-04 VITALS — HEART RATE: 94 BPM | RESPIRATION RATE: 20 BRPM | SYSTOLIC BLOOD PRESSURE: 150 MMHG | DIASTOLIC BLOOD PRESSURE: 72 MMHG

## 2017-10-04 DIAGNOSIS — R07.89 ATYPICAL CHEST PAIN: Primary | ICD-10-CM

## 2017-10-04 DIAGNOSIS — Z23 NEED FOR PROPHYLACTIC VACCINATION AND INOCULATION AGAINST INFLUENZA: ICD-10-CM

## 2017-10-04 DIAGNOSIS — E78.01 FAMILIAL HYPERCHOLESTEROLEMIA: ICD-10-CM

## 2017-10-04 LAB — INTERPRETATION ECG - MUSE: NORMAL

## 2017-10-04 RX ORDER — SIMVASTATIN 20 MG
20 TABLET ORAL DAILY
Qty: 90 TABLET | Refills: 3 | Status: SHIPPED | OUTPATIENT
Start: 2017-10-04 | End: 2018-10-11

## 2017-10-04 ASSESSMENT — PAIN SCALES - GENERAL: PAINLEVEL: NO PAIN (0)

## 2017-10-04 NOTE — PROGRESS NOTES
S) Ms. Mata reports an episode of chest discomfort this morning. See description below. She wasn't too worried but called clinic and was told to come in and be seen.  Chest Pain    Onset: this morning. Came on over a few minutes.    Description:   Location:  left side  Radiation: none        Character: tight    Duration, How long does pain last: 20-30 minute(s)    Intensity: mild    Frequency (if intermittent):        Frequency between episodes:  not applicable        Pain-free between episodes? not applicable    Precipitating and/or Alleviating factors:   Related to exertion: no  What type of activity will bring it on: NA  Does it go away with rest in 3-5 minutes: not applicable  Worse with deep breaths or coughs: YES  Related to food: no  She does report when she lies in bed at night on left side she can get some sensation of the left chest discomfort (chronic)    Accompanying Signs & Symptoms:        Sweating: no        Nausea/vomiting: no        Lightheadedness: no        Palpitations: no        Cough: no        Fevers: no        Abdominal pain: no      History:        First degree family History of heart disease; YES in mother but not at young age (74)        Smoker: no    Progression of Symptoms: resolved    Therapies tried and outcome: none  EKG was done.     Reviewed past records. Similar episode in November of last year. Dobutamine stress echo w/o WMAs but the EKG portion was abnl so CT angiogram performed and showed no evidence of CAD. Calcium scores were 0 in all coronary arteries placing her in the lowest risk category for age.     Family History as below:  Family History   Problem Relation Age of Onset     Myocardial Infarction Mother 74     CEREBROVASCULAR DISEASE Maternal Grandmother 74     Cancer - colorectal Maternal Aunt 68     Endocrine Disease Brother 10     Type I Diabetes Mellitus     Psychotic Disorder Brother      bipolar     C.A.D. Brother      C.A.D. Father      Psychotic Disorder Father  49     suicide     DIABETES Maternal Grandfather 65     DMII     Parkinsonism Other             O) /72  Pulse 94  Resp 20    Well appearing.   Drove here then walked from lot. Nl resp effort  Cor RRR no MRG  Lungs clear  Abd benign  No LE edema  Pulses symmetric at wrists bilaterally  No rashes  Mentation clear     EKG reviewed- NSR with 1st degree AV block, LAD. No acute ST or TW changes. Unchanged from previous EKG in 11/2016.    A/P) 1) Non-anginal chest pain in patient with negative CT angiogram and CC score of 0 in 11/2016. No evidence of an acute coronary syndrome. No evidence for PE. No evidence for dissection. No evidence for pneumothorax. Similar pain to past episodes in 11/2016 which prompted a thorough negative w/u. Will follow clinically. Discussed alarm symptoms to watch for-- dyspnea, lightheadedness, associated nausea, palpitations, radiation to back/neck/jaw, diaphoresis. Recommended evaluation in ED if alarm symptoms accompany chest pain or it worsens or does not resolve.     Over 15 min of 25 min visit spent in care coordination and counseling related to the above issues.    Nickolas Davenport MD, FACP, FAAP

## 2017-10-04 NOTE — NURSING NOTE
Chief Complaint   Patient presents with     Chest Pain     pt is here to discuss chest tightness this morning        Olga Xie CMA at 6:24 PM on 10/4/2017

## 2017-10-04 NOTE — MR AVS SNAPSHOT
After Visit Summary   10/4/2017    Lynnette Mata    MRN: [id number]           Patient Information     Date Of Birth          1935        Visit Information        Provider Department      10/4/2017 6:25 PM Nickolas Davenport MD [facility]        Today's Diagnoses     Atypical chest pain    -  1    Familial hypercholesterolemia          Care Instructions    Primary Care Center Phone Number [phone]  Primary Care Center Medication Refill Request Information:  * Please contact your pharmacy regarding ANY request for medication refills.  ** [facility] Prescription Fax = [phone]  * Please allow 3 business days for routine medication refills.  * Please allow 5 business days for controlled substance medication refills.     Primary Care Center Test Result notification information:  *You will be notified with in 7-10 days of your appointment day regarding the results of your test.  If you are on MyChart you will be notified as soon as the provider has reviewed the results and signed off on them.                  Follow-ups after your visit        Who to contact     Please call your clinic at [phone] to:    Ask questions about your health    Make or cancel appointments    Discuss your medicines    Learn about your test results    Speak to your doctor   If you have compliments or concerns about an experience at your clinic, or if you wish to file a complaint, please contact [facility] Patient Relations at [phone] or email us at [email or web address]         Additional Information About Your Visit        MyChart Information     MentorDOTMehart gives you secure access to your electronic health record. If you see a primary care provider, you can also send messages to your care team and make appointments. If you have questions, please call your primary care clinic.  If you do not have a primary care provider, please call [phone] and they  will assist you.      Tixers is an electronic gateway that provides easy, online access to your medical records. With Tixers, you can request a clinic appointment, read your test results, renew a prescription or communicate with your care team.     To access your existing account, please contact your [facility] or call [phone] for assistance.        Care EveryWhere ID     This is your Care EveryWhere ID. This could be used by other organizations to access your [place] medical records  [id number]        Your Vitals Were     Pulse Respirations                94 20           Blood Pressure from Last 3 Encounters:   10/04/17 150/72   09/08/17 129/74   08/02/17 119/74    Weight from Last 3 Encounters:   09/08/17 65.8 kg (145 lb)   08/02/17 67.1 kg (148 lb)   05/01/17 66.2 kg (145 lb 14.4 oz)              We Performed the Following     EKG Performed in Clinic w/ Provider Reading Fee          Today's Medication Changes          These changes are accurate as of: 10/4/17  7:16 PM.  If you have any questions, ask your nurse or doctor.               These medicines have changed or have updated prescriptions.        Dose/Directions    simvastatin 20 MG tablet   Commonly known as:  ZOCOR   This may have changed:    - medication strength  - how much to take   Used for:  Familial hypercholesterolemia, Atypical chest pain        Dose:  20 mg   Take 1 tablet (20 mg) by mouth daily   Quantity:  90 tablet   Refills:  3         Stop taking these medicines if you haven't already. Please contact your care team if you have questions.     sulfamethoxazole-trimethoprim 800-160 MG per tablet   Commonly known as:  BACTRIM DS/SEPTRA DS                Where to get your medicines      These medications were sent to [facility] [address] [address] AT [address] [address]     Phone:  [phone]     simvastatin 20 MG tablet                 Primary Care Provider Office Phone # Fax #    Nickolas Jose Francisco Davenport [phone] [address] [address]        Equal Access to Services     WENDIE BRAUN : Hadii sal nuñez paulao Soshaanali, waaxda luqadaha, qaybta kaalmada adekemal, katia brooksphuong keys. So [facility] [phone].    ATENCIÓN: Si habla español, tiene a calabrese disposición servicios gratuitos de asistencia lingüística. Llame al [phone].    We comply with applicable federal civil rights laws and [place] laws. We do not discriminate on the basis of race, color, national origin, age, disability, sex, sexual orientation, or gender identity.            Thank you!     Thank you for choosing [facility]  for your care. Our goal is always to provide you with excellent care. Hearing back from our patients is one way we can continue to improve our services. Please take a few minutes to complete the written survey that you may receive in the mail after your visit with us. Thank you!             Your Updated Medication List - Protect others around you: Learn how to safely use, store and throw away your medicines at www.disposemymeds.org.          This list is accurate as of: 10/4/17  7:16 PM.  Always use your most recent med list.                   Brand Name Dispense Instructions for use Diagnosis    aspirin 81 MG chewable tablet      Take 81 mg by mouth daily.        CALCIUM 600 PO      Take  by mouth daily.        ciclopirox 0.77 % cream    LOPROX    90 g    Apply topically 2 times daily    Dermatophytosis of nail       fish oil-omega-3 fatty acids 1000 MG capsule      Take 2 g by mouth daily.        glucosamine 500 MG Caps      Take  by mouth daily.        lisinopril 5 MG tablet    PRINIVIL/ZESTRIL    90 tablet    Take 1 tablet (5 mg) by mouth daily    Essential hypertension, benign       Multi-vitamin Tabs tablet   Generic drug:  multivitamin, therapeutic with minerals      Take 1  tablet by mouth daily.        simvastatin 20 MG tablet    ZOCOR    90 tablet    Take 1 tablet (20 mg) by mouth daily    Familial hypercholesterolemia, Atypical chest pain

## 2017-10-04 NOTE — PATIENT INSTRUCTIONS
Primary Care Center Phone Number [phone]  Primary Care Center Medication Refill Request Information:  * Please contact your pharmacy regarding ANY request for medication refills.  ** [facility] Prescription Fax = [phone]  * Please allow 3 business days for routine medication refills.  * Please allow 5 business days for controlled substance medication refills.     Primary Care Center Test Result notification information:  *You will be notified with in 7-10 days of your appointment day regarding the results of your test.  If you are on MyChart you will be notified as soon as the provider has reviewed the results and signed off on them.

## 2017-10-17 ENCOUNTER — OFFICE VISIT (OUTPATIENT)
Dept: FAMILY MEDICINE | Facility: CLINIC | Age: 82
End: 2017-10-17

## 2017-10-17 VITALS
DIASTOLIC BLOOD PRESSURE: 72 MMHG | HEIGHT: 67 IN | BODY MASS INDEX: 22.62 KG/M2 | HEART RATE: 82 BPM | RESPIRATION RATE: 16 BRPM | SYSTOLIC BLOOD PRESSURE: 136 MMHG | WEIGHT: 144.1 LBS | OXYGEN SATURATION: 97 % | TEMPERATURE: 97.8 F

## 2017-10-17 DIAGNOSIS — R31.29 OTHER MICROSCOPIC HEMATURIA: ICD-10-CM

## 2017-10-17 DIAGNOSIS — R35.0 URINARY FREQUENCY: Primary | ICD-10-CM

## 2017-10-17 DIAGNOSIS — R30.0 DYSURIA: ICD-10-CM

## 2017-10-17 LAB
BILIRUBIN UR: NORMAL MG/DL
BLOOD UR: NORMAL MG/DL
CLARITY, URINE: CLEAR
COLOR UR: YELLOW
GLUCOSE URINE: NORMAL MG/DL
KETONES UR QL: NORMAL MG/DL
LEUKOCYTE ESTERASE UR: NORMAL U/L
NITRITE UR QL STRIP: NORMAL MG/DL
PH UR STRIP: 5.5 [PH] (ref 5–7)
PROTEIN UR: NORMAL MG/DL
SP GR UR STRIP: 1.01
UROBILINOGEN UR STRIP-ACNC: 0.2 E.U./DL

## 2017-10-17 RX ORDER — PHENAZOPYRIDINE HYDROCHLORIDE 200 MG/1
200 TABLET, FILM COATED ORAL 3 TIMES DAILY PRN
Qty: 6 TABLET | Refills: 0 | COMMUNITY
Start: 2017-10-17 | End: 2018-02-26

## 2017-10-17 RX ORDER — SULFAMETHOXAZOLE/TRIMETHOPRIM 800-160 MG
1 TABLET ORAL 2 TIMES DAILY
Qty: 10 TABLET | Refills: 0 | Status: SHIPPED | OUTPATIENT
Start: 2017-10-17 | End: 2018-02-26

## 2017-10-17 ASSESSMENT — ENCOUNTER SYMPTOMS
FEVER: 0
FATIGUE: 0
DYSURIA: 1
FREQUENCY: 1
SHORTNESS OF BREATH: 0
CHILLS: 0

## 2017-10-17 ASSESSMENT — PAIN SCALES - GENERAL: PAINLEVEL: NO PAIN (0)

## 2017-10-17 NOTE — PROGRESS NOTES
"      HPI:       Lynnette Mata is a 82 year old who presents for the following  Patient presents with:  UTI: Reoccured 9/8. No blood in urine, frequency and urgency x 2 days.     Has had symptoms for 2-3 days. Notices frequency, urgency, and discomfort while urinating. No blood in her urine, fever, or chills. She was diagnosed with a UTI on September 8th last time she was seen at this clinic. This was after an experience where she was in [place] and couldn't void for long periods of time. Her symptoms were resolved after she was treated with antibiotics. Drinks at least two glasses of water every day and admits to needing to drink more. She tried to drink 8 glasses of water per day in the past, but found it too difficult to do.     Problem, Medication and Allergy Lists were reviewed and are current.  Patient is an established patient of this clinic.         Review of Systems:   Review of Systems   Constitutional: Negative for chills, fatigue and fever.   Respiratory: Negative for shortness of breath.    Genitourinary: Positive for dysuria, frequency and urgency. Negative for pelvic pain.             Physical Exam:     Patient Vitals for the past 24 hrs:   BP Temp Temp src Pulse Resp SpO2 Height Weight   10/17/17 1004 136/72 97.8  F (36.6  C) Oral 82 16 97 % 5' 6.7\" (169.4 cm) 144 lb 1.6 oz (65.4 kg)     Body mass index is 22.77 kg/(m^2).  Vitals were reviewed and were normal     Physical Exam   Constitutional: She appears well-developed and well-nourished.   Cardiovascular: Normal rate and regular rhythm.    Pulmonary/Chest: Effort normal and breath sounds normal.   Lymphadenopathy:     She has no cervical adenopathy.   Skin: Skin is warm and dry.   Psychiatric: She has a normal mood and affect. Her behavior is normal.         Results:      Results from the last 24 hoursNo results found for this or any previous visit (from the past 24 hour(s)).     Results for orders placed or performed in visit on 10/17/17 " "(from the past 24 hour(s))   Urinalysis, Micro If (UA) ([facility])   Result Value Ref Range    Leukocyte Esterase UR neg Negative U/l    Nitrite Urine neg Negative mg/dL    Protein UR neg Negative mg/dL    Glucose Urine neg Negative mg/dL    Ketones Urine neg Negative mg/dL    Urobilinogen mg/dL 0.2 0.2 E.U./dL    Bilirubin UR neg Negative mg/dL    Blood UR 2+ Negative mg/dL    Specific Gravity Urine 1.010 1.005 - 1.030    pH Urine 5.5 4.5 - 8.0    Color Urine yellow Yellow    Clarity, urine clear Clear          Assessment and Plan     1. Urinary frequency  2. Dysuria  3. Other microscopic hematuria  - Urinalysis, Micro If (UA) ([facility])  - Urine Culture Aerobic Bacterial  - sulfamethoxazole-trimethoprim (BACTRIM DS/SEPTRA DS) 800-160 MG per tablet; Take 1 tablet by mouth 2 times daily  Dispense: 10 tablet; Refill: 0  - phenazopyridine (PYRIDIUM) 200 MG tablet; Take 1 tablet (200 mg) by mouth 3 times daily as needed for irritation  Dispense: 6 tablet; Refill: 0  Educated patient to start treatment plan noted above and to continue to monitor, reviewed handout below, all questions answered. Call clinic if worsening or no improvement.     There are no discontinued medications.  Options for treatment and follow-up care were reviewed with the patient. Lynnette Mata  engaged in the decision making process and verbalized understanding of the options discussed and agreed with the final plan.    Patient seen and examined with FNP student Maria Teresa Farley. Note co-authored, I agree with documentation and plan.  Sharron Valente, [employer]      Patient Instructions      * BLADDER INFECTION,Female (Adult)    A bladder infection (\"cystitis\" or \"UTI\") usually causes a constant urge to urinate and a burning when passing urine. Urine may be cloudy, smelly or dark. There may be pain in the lower abdomen. A bladder infection occurs when bacteria from the vaginal area enter the bladder opening (urethra). This can occur from " sexual intercourse, wearing tight clothing, dehydration and other factors.  HOME CARE:  1. Drink lots of fluids (at least 6-8 glasses a day, unless you must restrict fluids for other medical reasons). This will force the medicine into your urinary system and flush the bacteria out of your body. Cranberry juice has been shown to help clear out the bacteria.  2. Avoid sexual intercourse until your symptoms are gone.  3. A bladder infection is treated with antibiotics. You may also be given Pyridium (generic = phenazopyridine) to reduce the burning sensation. This medicine will cause your urine to become a bright orange color. The orange urine may stain clothing. You may wear a pad or panty-liner to protect clothing.  PREVENTING FUTURE INFECTIONS:  1. Always wipe from front to back after a bowel movement.  2. Keep the genital area clean and dry.  3. Drink plenty of fluids each day to avoid dehydration.  4. Urinate right after intercourse to flush out the bladder.  5. Wear cotton underwear and cotton-lined panty hose; avoid tight-fitting pants.  6. If you are on birth control pills and are having frequent bladder infections, discuss with your doctor.  FOLLOW UP: Return to this facility or see your doctor if ALL symptoms are not gone after three days of treatment.  GET PROMPT MEDICAL ATTENTION if any of the following occur:    Fever over 101 F (38.3 C)    No improvement by the third day of treatment    Increasing back or abdominal pain    Repeated vomiting; unable to keep medicine down    Weakness, dizziness or fainting    Vaginal discharge    Pain, redness or swelling in the labia (outer vaginal area)    5130-7818 [employer], [address] [place] [address], [place] [address]. All rights reserved. This information is not intended as a substitute for professional medical care. Always follow your healthcare professional's instructions.

## 2017-10-17 NOTE — NURSING NOTE
"82 year old  Chief Complaint   Patient presents with     UTI     Reoccured 9/8. No blood in urine, frequency and urgency x 2 days.        Blood pressure 136/72, pulse 82, temperature 97.8  F (36.6  C), temperature source Oral, resp. rate 16, height 5' 6.7\" (169.4 cm), weight 144 lb 1.6 oz (65.4 kg), SpO2 97 %, not currently breastfeeding. Body mass index is 22.77 kg/(m^2).  BP completed using cuff size: regular    Patient Active Problem List   Diagnosis     Essential hypertension, benign     Post-menopause     Cervical stenosis (uterine cervix)     Knee pain, bilateral     Encounter for routine gynecological examination     Primary osteoarthritis of both knees     Genital atrophy of female     ACP (advance care planning)     Abnormal cardiovascular stress test       Wt Readings from Last 2 Encounters:   10/17/17 144 lb 1.6 oz (65.4 kg)   09/08/17 145 lb (65.8 kg)     BP Readings from Last 3 Encounters:   10/17/17 136/72   10/04/17 150/72   09/08/17 129/74       Current Outpatient Prescriptions   Medication     simvastatin (ZOCOR) 20 MG tablet     lisinopril (PRINIVIL/ZESTRIL) 5 MG tablet     ciclopirox (LOPROX) 0.77 % cream     Calcium Carbonate (CALCIUM 600 PO)     Glucosamine 500 MG CAPS     fish oil-omega-3 fatty acids (FISH OIL) 1000 MG capsule     aspirin 81 MG chewable tablet     Multiple Vitamin (MULTI-VITAMIN) per tablet     No current facility-administered medications for this visit.        Social History   Substance Use Topics     Smoking status: Never Smoker     Smokeless tobacco: Never Used     Alcohol use 0.0 oz/week     0 Standard drinks or equivalent per week      Comment: 1 d; <3x week / week MAX,        Health Maintenance Due   Topic Date Due     RUDI QUESTIONNAIRE 1 YEAR  03/02/2017     PHQ-9 Q1YR  03/02/2017       No results found for: PAP    PHQ-2 ( 1999 Pf[employer]) 7/8/2015 2/3/2014   Q1: Little interest or pleasure in doing things 0 0   Q2: Feeling down, depressed or hopeless 0 0   PHQ-2 Score 0 0 "       PHQ-9 SCORE 3/2/2016   Total Score 0       RUDI-7 SCORE 3/2/2016   Total Score 1       No flowsheet data found.    Violetta Strickland MA  October 17, 2017 10:08 AM

## 2017-10-17 NOTE — PATIENT INSTRUCTIONS
"   * BLADDER INFECTION,Female (Adult)    A bladder infection (\"cystitis\" or \"UTI\") usually causes a constant urge to urinate and a burning when passing urine. Urine may be cloudy, smelly or dark. There may be pain in the lower abdomen. A bladder infection occurs when bacteria from the vaginal area enter the bladder opening (urethra). This can occur from sexual intercourse, wearing tight clothing, dehydration and other factors.  HOME CARE:  1. Drink lots of fluids (at least 6-8 glasses a day, unless you must restrict fluids for other medical reasons). This will force the medicine into your urinary system and flush the bacteria out of your body. Cranberry juice has been shown to help clear out the bacteria.  2. Avoid sexual intercourse until your symptoms are gone.  3. A bladder infection is treated with antibiotics. You may also be given Pyridium (generic = phenazopyridine) to reduce the burning sensation. This medicine will cause your urine to become a bright orange color. The orange urine may stain clothing. You may wear a pad or panty-liner to protect clothing.  PREVENTING FUTURE INFECTIONS:  1. Always wipe from front to back after a bowel movement.  2. Keep the genital area clean and dry.  3. Drink plenty of fluids each day to avoid dehydration.  4. Urinate right after intercourse to flush out the bladder.  5. Wear cotton underwear and cotton-lined panty hose; avoid tight-fitting pants.  6. If you are on birth control pills and are having frequent bladder infections, discuss with your doctor.  FOLLOW UP: Return to this facility or see your doctor if ALL symptoms are not gone after three days of treatment.  GET PROMPT MEDICAL ATTENTION if any of the following occur:    Fever over 101 F (38.3 C)    No improvement by the third day of treatment    Increasing back or abdominal pain    Repeated vomiting; unable to keep medicine down    Weakness, dizziness or fainting    Vaginal discharge    Pain, redness or swelling in " the labia (outer vaginal area)    9438-5502 [employer], [address] [place] [address], [place] [address]. All rights reserved. This information is not intended as a substitute for professional medical care. Always follow your healthcare professional's instructions.

## 2017-10-17 NOTE — MR AVS SNAPSHOT
"              After Visit Summary   10/17/2017    Lynnette Mata    MRN: [id number]           Patient Information     Date Of Birth          1935        Visit Information        Provider Department      10/17/2017 10:00 AM Sharron Valente NP [facility]        Today's Diagnoses     Urinary frequency    -  1      Care Instructions       * BLADDER INFECTION,Female (Adult)    A bladder infection (\"cystitis\" or \"UTI\") usually causes a constant urge to urinate and a burning when passing urine. Urine may be cloudy, smelly or dark. There may be pain in the lower abdomen. A bladder infection occurs when bacteria from the vaginal area enter the bladder opening (urethra). This can occur from sexual intercourse, wearing tight clothing, dehydration and other factors.  HOME CARE:  1. Drink lots of fluids (at least 6-8 glasses a day, unless you must restrict fluids for other medical reasons). This will force the medicine into your urinary system and flush the bacteria out of your body. Cranberry juice has been shown to help clear out the bacteria.  2. Avoid sexual intercourse until your symptoms are gone.  3. A bladder infection is treated with antibiotics. You may also be given Pyridium (generic = phenazopyridine) to reduce the burning sensation. This medicine will cause your urine to become a bright orange color. The orange urine may stain clothing. You may wear a pad or panty-liner to protect clothing.  PREVENTING FUTURE INFECTIONS:  1. Always wipe from front to back after a bowel movement.  2. Keep the genital area clean and dry.  3. Drink plenty of fluids each day to avoid dehydration.  4. Urinate right after intercourse to flush out the bladder.  5. Wear cotton underwear and cotton-lined panty hose; avoid tight-fitting pants.  6. If you are on birth control pills and are having frequent bladder infections, discuss with your doctor.  FOLLOW UP: Return to this facility or see your doctor if ALL symptoms " are not gone after three days of treatment.  GET PROMPT MEDICAL ATTENTION if any of the following occur:    Fever over 101 F (38.3 C)    No improvement by the third day of treatment    Increasing back or abdominal pain    Repeated vomiting; unable to keep medicine down    Weakness, dizziness or fainting    Vaginal discharge    Pain, redness or swelling in the labia (outer vaginal area)    2882-0205 [employer], [address] [place] [address], [place] [address]. All rights reserved. This information is not intended as a substitute for professional medical care. Always follow your healthcare professional's instructions.            Follow-ups after your visit        Who to contact     Please call your clinic at [phone] to:    Ask questions about your health    Make or cancel appointments    Discuss your medicines    Learn about your test results    Speak to your doctor   If you have compliments or concerns about an experience at your clinic, or if you wish to file a complaint, please contact [facility] Patient Relations at [phone] or email us at [email or web address]         Additional Information About Your Visit        Project [employer] Information     [employer] gives you secure access to your electronic health record. If you see a primary care provider, you can also send messages to your care team and make appointments. If you have questions, please call your primary care clinic.  If you do not have a primary care provider, please call [phone] and they will assist you.      [employer] is an electronic gateway that provides easy, online access to your medical records. With [employer], you can request a clinic appointment, read your test results, renew a prescription or communicate with your care team.     To access your existing account, please contact your [facility] or call [phone] for assistance.        Care EveryWhere ID     This is your Care  "EveryWhere ID. This could be used by other organizations to access your [facility] medical records  [id number]        Your Vitals Were     Pulse Temperature Respirations Height Pulse Oximetry Breastfeeding?    82 97.8  F (36.6  C) (Oral) 16 5' 6.7\" (169.4 cm) 97% No    BMI (Body Mass Index)                   22.77 kg/m2            Blood Pressure from Last 3 Encounters:   10/17/17 136/72   10/04/17 150/72   09/08/17 129/74    Weight from Last 3 Encounters:   10/17/17 144 lb 1.6 oz (65.4 kg)   09/08/17 145 lb (65.8 kg)   08/02/17 148 lb (67.1 kg)              We Performed the Following     Urinalysis, Micro If (UA) ([facility])     Urine Culture Aerobic Bacterial          Today's Medication Changes          These changes are accurate as of: 10/17/17 10:43 AM.  If you have any questions, ask your nurse or doctor.               Start taking these medicines.        Dose/Directions    phenazopyridine 200 MG tablet   Commonly known as:  PYRIDIUM   Used for:  Urinary frequency   Started by:  Sharron Valente NP        Dose:  200 mg   Take 1 tablet (200 mg) by mouth 3 times daily as needed for irritation   Quantity:  6 tablet   Refills:  0       sulfamethoxazole-trimethoprim 800-160 MG per tablet   Commonly known as:  BACTRIM DS/SEPTRA DS   Used for:  Urinary frequency   Started by:  Sharron Valente NP        Dose:  1 tablet   Take 1 tablet by mouth 2 times daily   Quantity:  10 tablet   Refills:  0            Where to get your medicines      These medications were sent to [facility] [id number] [place] [address] AT [address] [address]     Phone:  [phone]     sulfamethoxazole-trimethoprim 800-160 MG per tablet         Some of these will need a paper prescription and others can be bought over the counter.  Ask your nurse if you have questions.     You don't need a prescription for these medications     phenazopyridine 200 MG tablet                " Primary Care Provider Office Phone # Fax #    Nickolas Jose Francisco Davenport [phone] [address] [address]        Equal Access to Services     WENDIE BRAUN : Jeff sal nuñez paulao Soshaanali, waaxda luqadaha, qaybta kaalmada azam, katia brooksphuong keys. So [facility] [phone].    ATENCIÓN: Si habla español, tiene a calabrese disposición servicios gratuitos de asistencia lingüística. Llame al [phone].    We comply with applicable federal civil rights laws and [place] laws. We do not discriminate on the basis of race, color, national origin, age, disability, sex, sexual orientation, or gender identity.            Thank you!     Thank you for choosing [facility]  for your care. Our goal is always to provide you with excellent care. Hearing back from our patients is one way we can continue to improve our services. Please take a few minutes to complete the written survey that you may receive in the mail after your visit with us. Thank you!             Your Updated Medication List - Protect others around you: Learn how to safely use, store and throw away your medicines at www.disposemymeds.org.          This list is accurate as of: 10/17/17 10:43 AM.  Always use your most recent med list.                   Brand Name Dispense Instructions for use Diagnosis    aspirin 81 MG chewable tablet      Take 81 mg by mouth daily.        CALCIUM 600 PO      Take  by mouth daily.        ciclopirox 0.77 % cream    LOPROX    90 g    Apply topically 2 times daily    Dermatophytosis of nail       fish oil-omega-3 fatty acids 1000 MG capsule      Take 2 g by mouth daily.        glucosamine 500 MG Caps      Take  by mouth daily.        lisinopril 5 MG tablet    PRINIVIL/ZESTRIL    90 tablet    Take 1 tablet (5 mg) by mouth daily    Essential hypertension, benign       Multi-vitamin Tabs tablet   Generic drug:  multivitamin, therapeutic with minerals      Take 1 tablet  by mouth daily.        phenazopyridine 200 MG tablet    PYRIDIUM    6 tablet    Take 1 tablet (200 mg) by mouth 3 times daily as needed for irritation    Urinary frequency       simvastatin 20 MG tablet    ZOCOR    90 tablet    Take 1 tablet (20 mg) by mouth daily    Familial hypercholesterolemia, Atypical chest pain       sulfamethoxazole-trimethoprim 800-160 MG per tablet    BACTRIM DS/SEPTRA DS    10 tablet    Take 1 tablet by mouth 2 times daily    Urinary frequency

## 2017-10-18 LAB
BACTERIA SPEC CULT: NO GROWTH
Lab: NORMAL
SPECIMEN SOURCE: NORMAL

## 2017-12-28 ENCOUNTER — HOSPITAL ENCOUNTER (EMERGENCY)
Facility: CLINIC | Age: 82
Discharge: HOME | End: 2017-12-28
Attending: EMERGENCY MEDICINE | Admitting: EMERGENCY MEDICINE
Payer: MEDICARE

## 2017-12-28 ENCOUNTER — APPOINTMENT (OUTPATIENT)
Dept: CT IMAGING | Facility: CLINIC | Age: 82
End: 2017-12-28
Attending: EMERGENCY MEDICINE
Payer: MEDICARE

## 2017-12-28 VITALS
WEIGHT: 147.3 LBS | TEMPERATURE: 98.6 F | SYSTOLIC BLOOD PRESSURE: 156 MMHG | BODY MASS INDEX: 23.12 KG/M2 | RESPIRATION RATE: 14 BRPM | DIASTOLIC BLOOD PRESSURE: 91 MMHG | HEIGHT: 67 IN | OXYGEN SATURATION: 98 %

## 2017-12-28 DIAGNOSIS — S10.93XA CONTUSION OF FACE, SCALP AND NECK, INITIAL ENCOUNTER: ICD-10-CM

## 2017-12-28 DIAGNOSIS — S00.03XA CONTUSION OF FACE, SCALP AND NECK, INITIAL ENCOUNTER: ICD-10-CM

## 2017-12-28 DIAGNOSIS — S00.83XA CONTUSION OF FACE, SCALP AND NECK, INITIAL ENCOUNTER: ICD-10-CM

## 2017-12-28 PROCEDURE — 99284 EMERGENCY DEPT VISIT MOD MDM: CPT | Mod: Z6 | Performed by: EMERGENCY MEDICINE

## 2017-12-28 PROCEDURE — 70450 CT HEAD/BRAIN W/O DYE: CPT

## 2017-12-28 PROCEDURE — 99284 EMERGENCY DEPT VISIT MOD MDM: CPT | Mod: 25 | Performed by: EMERGENCY MEDICINE

## 2017-12-28 ASSESSMENT — ENCOUNTER SYMPTOMS
EYE REDNESS: 0
ABDOMINAL PAIN: 0
WOUND: 1
WEAKNESS: 0
CONFUSION: 0
ARTHRALGIAS: 0
NECK PAIN: 0
BACK PAIN: 0
COLOR CHANGE: 0
FEVER: 0
NECK STIFFNESS: 0
NUMBNESS: 0
MYALGIAS: 0
SHORTNESS OF BREATH: 0
HEADACHES: 0
DIFFICULTY URINATING: 0

## 2017-12-28 NOTE — ED AVS SNAPSHOT
[facility], Emergency Department    [address]    [place] [address]    Phone:  [phone]    Fax:  [phone]                                       Lynnette Mata   MRN: [id number]    Department:  [facility], Emergency Department   Date of Visit:  12/28/2017           Patient Information     Date Of Birth          1935        Your diagnoses for this visit were:     Contusion of face, scalp and neck, initial encounter        You were seen by Mary Lai MD.        Discharge Instructions       Thank you for coming to the [facility] Emergency Department.     Please return to the ER if you develop headache, confusion, numbness or weakness in the arms or legs.     Continue your usual medications.     Follow up with your primary care doctor this week with any concerns.     Monitor the scalp abrasion for signs of infection, and return if you notice redness, increased swelling, pus draining from the wound, fever >100.4F    24 Hour Appointment Hotline       To make an appointment at any [facility] clinic, call [phone] ([phone]). If you don't have a family doctor or clinic, we will help you find one. [facility] clinics are conveniently located to serve the needs of you and your family.             Review of your medicines      Our records show that you are taking the medicines listed below. If these are incorrect, please call your family doctor or clinic.        Dose / Directions Last dose taken    aspirin 81 MG chewable tablet   Dose:  81 mg        Take 81 mg by mouth daily.   Refills:  0        CALCIUM 600 PO        Take  by mouth daily.   Refills:  0        ciclopirox 0.77 % cream   Commonly known as:  LOPROX   Quantity:  90 g        Apply topically 2 times daily   Refills:  6        fish oil-omega-3 fatty acids 1000 MG capsule   Dose:  2 g        Take 2 g by mouth daily.   Refills:  0        glucosamine 500 MG Caps        Take  by mouth daily.    Refills:  0        lisinopril 5 MG tablet   Commonly known as:  PRINIVIL/ZESTRIL   Dose:  5 mg   Quantity:  90 tablet        Take 1 tablet (5 mg) by mouth daily   Refills:  3        Multi-vitamin Tabs tablet   Dose:  1 tablet   Generic drug:  multivitamin, therapeutic with minerals        Take 1 tablet by mouth daily.   Refills:  0        phenazopyridine 200 MG tablet   Commonly known as:  PYRIDIUM   Dose:  200 mg   Quantity:  6 tablet        Take 1 tablet (200 mg) by mouth 3 times daily as needed for irritation   Refills:  0        simvastatin 20 MG tablet   Commonly known as:  ZOCOR   Dose:  20 mg   Quantity:  90 tablet        Take 1 tablet (20 mg) by mouth daily   Refills:  3        sulfamethoxazole-trimethoprim 800-160 MG per tablet   Commonly known as:  BACTRIM DS/SEPTRA DS   Dose:  1 tablet   Quantity:  10 tablet        Take 1 tablet by mouth 2 times daily   Refills:  0                Procedures and tests performed during your visit     Head CT w/o contrast      Orders Needing Specimen Collection     None      Pending Results     Date and Time Order Name Status Description    12/28/2017 2011 Head CT w/o contrast Preliminary             Pending Culture Results     No orders found from 12/26/2017 to 12/29/2017.            Pending Results Instructions     If you had any lab results that were not finalized at the time of your Discharge, you can call the ED Lab Result RN at [phone]. You will be contacted by this team for any positive Lab results or changes in treatment. The nurses are available 7 days a week from 10A to 6:30P.  You can leave a message 24 hours per day and they will return your call.        Thank you for choosing [facility]       Thank you for choosing [facility] for your care. Our goal is always to provide you with excellent care. Hearing back from our patients is one way we can continue to improve our services. Please take a few minutes to complete the written survey that you may receive in the  mail after you visit with us. Thank you!        [employer] Information     [employer] gives you secure access to your electronic health record. If you see a primary care provider, you can also send messages to your care team and make appointments. If you have questions, please call your primary care clinic.  If you do not have a primary care provider, please call [phone] and they will assist you.        Care EveryWhere ID     This is your Care EveryWhere ID. This could be used by other organizations to access your [facility] medical records  [id number]        Equal Access to Services     WENDIE BRAUN : Jeff Montgomery, joshua rizzo, kuldip de la fuentealmichael nascimento, katia alonzo . So [facility] [phone].    ATENCIÓN: Si habla español, tiene a calabrese disposición servicios gratuitos de asistencia lingüística. Darshanaame al [phone].    We comply with applicable federal civil rights laws and [place] laws. We do not discriminate on the basis of race, color, national origin, age, disability, sex, sexual orientation, or gender identity.            After Visit Summary       This is your record. Keep this with you and show to your community pharmacist(s) and doctor(s) at your next visit.

## 2017-12-28 NOTE — ED AVS SNAPSHOT
[facility], Emergency Department    [address]    [facility] [address]    Phone:  [phone]    Fax:  [phone]                                       Lynnette Mata   MRN: [id number]    Department:  [facility], Emergency Department   Date of Visit:  12/28/2017           After Visit Summary Signature Page     I have received my discharge instructions, and my questions have been answered. I have discussed any challenges I see with this plan with the nurse or doctor.    ..........................................................................................................................................  Patient/Patient Representative Signature      ..........................................................................................................................................  Patient Representative Print Name and Relationship to Patient    ..................................................               ................................................  Date                                            Time    ..........................................................................................................................................  Reviewed by Signature/Title    ...................................................              ..............................................  Date                                                            Time

## 2017-12-29 NOTE — ED PROVIDER NOTES
History     Chief Complaint   Patient presents with     Fall     pt reported she fell and hit the back of her head. Sustained a wound. Bleeding was controlled. Denies lossing consciousness.     HPI  Lynnette Mata is an 82-year-old female who presents with a mechanical fall.  She was walking with a friend into the [employer] when she misjudged the door handle and slipped backwards.  She landed striking her head on a concrete floor.  No loss of consciousness, no neck or back pain, no extremity injuries. She is able to get up without assistance and ambulate without difficulty.  She did have bleeding from the posterior scalp that has since stopped after holding pressure.  She takes 81 mg aspirin daily, but is not anticoagulated.  No history of traumatic brain injury.  No numbness or extremity weakness or gait instability.        PAST MEDICAL HISTORY:   Past Medical History:   Diagnosis Date     Asymptomatic menopausal state age 45    Hx HRT use x 6+ years--off with WH study findings     Shingles outbreak 2/14/2013    Despite immunization at age 72!        Stricture and stenosis of cervix      Unspecified constipation     hx chronic       PAST SURGICAL HISTORY:   Past Surgical History:   Procedure Laterality Date     COLONOSCOPY  4.15.09    (Fiberoptic) WNL -- ami in 10 yrs       FAMILY HISTORY:   Family History   Problem Relation Age of Onset     Myocardial Infarction Mother 74     CEREBROVASCULAR DISEASE Maternal Grandmother 74     Cancer - colorectal Maternal Aunt 68     Endocrine Disease Brother 10     Type I Diabetes Mellitus     Psychotic Disorder Brother      bipolar     C.A.D. Brother      C.A.D. Father      Psychotic Disorder Father 49     suicide     DIABETES Maternal Grandfather 65     DMII     Parkinsonism Other             SOCIAL HISTORY:   Social History   Substance Use Topics     Smoking status: Never Smoker     Smokeless tobacco: Never Used     Alcohol use 0.0 oz/week     0 Standard  "drinks or equivalent per week      Comment: 1 d; <3x week / week MAX,             Allergies   Allergen Reactions     Nkda [No Known Drug Allergies]          I have reviewed the Medications, Allergies, Past Medical and Surgical History, and Social History in the Epic system.    Review of Systems   Constitutional: Negative for fever.   HENT: Negative for congestion.    Eyes: Negative for redness.   Respiratory: Negative for shortness of breath.    Cardiovascular: Negative for chest pain.   Gastrointestinal: Negative for abdominal pain.   Genitourinary: Negative for difficulty urinating.   Musculoskeletal: Negative for arthralgias, back pain, gait problem, myalgias, neck pain and neck stiffness.   Skin: Positive for wound. Negative for color change.   Neurological: Negative for syncope, weakness, numbness and headaches.   Psychiatric/Behavioral: Negative for confusion.       Physical Exam   BP: 179/76  Heart Rate: 83  Temp: 98.6  F (37  C)  Resp: 16  Height: 170.2 cm (5' 7\")  Weight: 66.8 kg (147 lb 4.8 oz)  SpO2: 96 %      Physical Exam   Gen:A&Ox3, no acute distress  HEENT:PERRL, no facial tenderness or wounds, posterior scalp with hematoma with abrasion overlying, oropharynx clear, mucous membranes moist, TMs clear bilaterally  Neck: full ROM without pain, no midline or perispinal tenderness  Back: no CVA tenderness, no midline bony tenderness  CV:RRR without murmurs  PULM:Clear to auscultation bilaterally  Abd:soft, nontender, nondistended. Bowel sounds present and normal  UE:No traumatic injuries, skin normal, + radial pulses and normal perfusion  LE:no traumatic injuries, skin normal, + DP pulses and normal perfusion.   Neuro:CN II-XII intact, strength 5/5 of flexion and extension of the toes, ankles, knees, hips, hands, wrists, elbows and shoulders.  Sensation intact to touch throughout. Coordination normal on finger to nose testing. Reflexes 3/6 and symmetric throughout. No clonus.  Gait normal. Able to walk on " heels and toes.  Negative Romberg sign. No pronator drift.   Skin: no rashes or ecchymoses      ED Course     ED Course     Procedures             Critical Care time:  none    Assessments & Plan (with Medical Decision Making)   82-year-old female presenting with mechanical slip and fall with contusion to the posterior scalp. CTLS spine cleared clinically. Indication for brain imaging due to her age and aspirin use.  Neurologic exam without abnormalities. CT head negative for skull fractures or intracranial injuries. There is a small scalp hematoma with associated abrasion, not requiring closure. Abrasion cleaned with NS and dressed with antibiotic ointment.  Pt's friend will escort her home and monitor her for changes in mental status. Discharged.    This part of the document was transcribed by Negro Lo, Medical Scribe.       I have reviewed the nursing notes.    I have reviewed the findings, diagnosis, plan and need for follow up with the patient.    Discharge Medication List as of 12/28/2017  9:45 PM          Final diagnoses:   Contusion of face, scalp and neck, initial encounter       12/28/2017   [facility], [place], EMERGENCY DEPARTMENT    MD RANJAN Soares, Mary Pulido MD  12/29/17 0102

## 2017-12-29 NOTE — DISCHARGE INSTRUCTIONS
Thank you for coming to the [facility] Emergency Department.     Please return to the ER if you develop headache, confusion, numbness or weakness in the arms or legs.     Continue your usual medications.     Follow up with your primary care doctor this week with any concerns.     Monitor the scalp abrasion for signs of infection, and return if you notice redness, increased swelling, pus draining from the wound, fever >100.4F

## 2018-02-26 ENCOUNTER — OFFICE VISIT (OUTPATIENT)
Dept: INTERNAL MEDICINE | Facility: CLINIC | Age: 83
End: 2018-02-26
Payer: COMMERCIAL

## 2018-02-26 VITALS
BODY MASS INDEX: 22.5 KG/M2 | HEIGHT: 66 IN | TEMPERATURE: 97.6 F | HEART RATE: 71 BPM | DIASTOLIC BLOOD PRESSURE: 69 MMHG | WEIGHT: 140 LBS | OXYGEN SATURATION: 95 % | SYSTOLIC BLOOD PRESSURE: 122 MMHG

## 2018-02-26 DIAGNOSIS — M62.81 GENERALIZED MUSCLE WEAKNESS: ICD-10-CM

## 2018-02-26 DIAGNOSIS — I10 ESSENTIAL HYPERTENSION, BENIGN: ICD-10-CM

## 2018-02-26 DIAGNOSIS — R26.89 BALANCE PROBLEMS: Primary | ICD-10-CM

## 2018-02-26 DIAGNOSIS — Z91.81 PERSONAL HISTORY OF FALL: ICD-10-CM

## 2018-02-26 RX ORDER — LISINOPRIL 10 MG/1
10 TABLET ORAL DAILY
Qty: 90 TABLET | Refills: 3 | Status: SHIPPED | OUTPATIENT
Start: 2018-02-26 | End: 2018-10-22

## 2018-02-26 ASSESSMENT — PAIN SCALES - GENERAL: PAINLEVEL: MILD PAIN (3)

## 2018-02-26 NOTE — NURSING NOTE
Chief Complaint   Patient presents with     Wellness Visit     Patient here for annual wellness visit.     Love Saab LPN at 12:00 PM on 2/26/2018.

## 2018-02-26 NOTE — MR AVS SNAPSHOT
After Visit Summary   2/26/2018    Lynnette Mata    MRN: [id number]           Patient Information     Date Of Birth          1935        Visit Information        Provider Department      2/26/2018 12:00 PM Nickolas Davenport MD [facility]        Today's Diagnoses     Balance problems    -  1    Personal history of fall        Essential hypertension, benign        Generalized muscle weakness           Follow-ups after your visit        Additional Services     PHYSIATRY (PM&R) REFERRAL       Your provider has referred you to: [facility]: [facility] - [place] [phone]   [email or web address]     Please note these locations do not prescribe narcotics or manage chronic pain.    Please be aware that coverage of these services is subject to the terms and limitations of your health insurance plan.  Call member services at your health plan with any benefit or coverage questions.      Please bring the following to your appointment:  >>   Any x-rays, CTs or MRIs which have been performed.  Contact the facility where they were done to arrange for  prior to your scheduled appointment.    >>   List of current medications   >>   This referral request   >>   Any documents/labs given to you for this referral                  Your next 10 appointments already scheduled     May 02, 2018 10:15 AM CDT   (Arrive by 10:00 AM)   MA SCREENING DIGITAL BILATERAL with URBCMA1   [facility] ([facility])    [address] [place] [address], [address]  [facility] [address] [phone]           Do not use any powder, lotion or deodorant under your arms or on your breast. If you do, we will ask you to remove it before your exam.  Wear comfortable, two-piece clothing.  If you have any allergies, tell your care team.  Bring any previous mammograms from other facilities or have them mailed to the breast center.              Future tests that were  "ordered for you today     Open Future Orders        Priority Expected Expires Ordered    CK total Routine 2/26/2018 2/26/2019 2/26/2018    Methylmalonic acid urine quantitative Routine  2/27/2019 2/26/2018            Who to contact     Please call your clinic at [phone] to:    Ask questions about your health    Make or cancel appointments    Discuss your medicines    Learn about your test results    Speak to your doctor            Additional Information About Your Visit        HOLLR Information     HOLLR gives you secure access to your electronic health record. If you see a primary care provider, you can also send messages to your care team and make appointments. If you have questions, please call your primary care clinic.  If you do not have a primary care provider, please call [phone] and they will assist you.      HOLLR is an electronic gateway that provides easy, online access to your medical records. With HOLLR, you can request a clinic appointment, read your test results, renew a prescription or communicate with your care team.     To access your existing account, please contact your [facility] or call [phone] for assistance.        Care EveryWhere ID     This is your Care EveryWhere ID. This could be used by other organizations to access your [facility] medical records  [id number]        Your Vitals Were     Pulse Temperature Height Pulse Oximetry Breastfeeding? BMI (Body Mass Index)    71 97.6  F (36.4  C) (Oral) 1.665 m (5' 5.55\") 95% No 22.91 kg/m2       Blood Pressure from Last 3 Encounters:   02/26/18 122/69   12/28/17 (!) 156/91   10/17/17 136/72    Weight from Last 3 Encounters:   02/26/18 63.5 kg (140 lb)   12/28/17 66.8 kg (147 lb 4.8 oz)   10/17/17 65.4 kg (144 lb 1.6 oz)              We Performed the Following     PHYSIATRY (PM&R) REFERRAL          Today's Medication Changes          These changes are accurate as of 2/26/18 12:57 PM.  If you " have any questions, ask your nurse or doctor.               These medicines have changed or have updated prescriptions.        Dose/Directions    lisinopril 10 MG tablet   Commonly known as:  PRINIVIL/ZESTRIL   This may have changed:    - medication strength  - how much to take   Used for:  Essential hypertension, benign, Balance problems, Personal history of fall, Generalized muscle weakness   Changed by:  Nickolas Davenport MD        Dose:  10 mg   Take 1 tablet (10 mg) by mouth daily   Quantity:  90 tablet   Refills:  3            Where to get your medicines      These medications were sent to [facility] [id number] [address] [address]     Phone:  [phone]     lisinopril 10 MG tablet                Primary Care Provider Office Phone # Fax #    Nickolas Davenport [phone]       [address] [address]        Equal Access to Services     LUIS DANIEL BRAUN AH: Hadii sal ku hadasho Soomaali, waaxda luqadaha, qaybta kaalmada adeegyada, waxay tanain haygaln madhu alonzo . So [facility] [phone].    ATENCIÓN: Si flyla espcarlos, tiene a calabrese disposición servicios gratuitos de asistencia lingüística. Llame al [phone].    We comply with applicable federal civil rights laws and [place] laws. We do not discriminate on the basis of race, color, national origin, age, disability, sex, sexual orientation, or gender identity.            Thank you!     Thank you for choosing [facility]  for your care. Our goal is always to provide you with excellent care. Hearing back from our patients is one way we can continue to improve our services. Please take a few minutes to complete the written survey that you may receive in the mail after your visit with us. Thank you!             Your Updated Medication List - Protect others around you: Learn how to safely use, store and throw away your  medicines at www.disposemymeds.org.          This list is accurate as of 2/26/18 12:57 PM.  Always use your most recent med list.                   Brand Name Dispense Instructions for use Diagnosis    aspirin 81 MG chewable tablet      Take 81 mg by mouth daily.        CALCIUM 600 PO      Take  by mouth daily.        fish oil-omega-3 fatty acids 1000 MG capsule      Take 2 g by mouth daily.        glucosamine 500 MG Caps      Take  by mouth daily.        lisinopril 10 MG tablet    PRINIVIL/ZESTRIL    90 tablet    Take 1 tablet (10 mg) by mouth daily    Essential hypertension, benign, Balance problems, Personal history of fall, Generalized muscle weakness       Multi-vitamin Tabs tablet   Generic drug:  multivitamin, therapeutic with minerals      Take 1 tablet by mouth daily.        simvastatin 20 MG tablet    ZOCOR    90 tablet    Take 1 tablet (20 mg) by mouth daily    Familial hypercholesterolemia, Atypical chest pain

## 2018-02-26 NOTE — PROGRESS NOTES
"S) Ms. Mata is seen for ongoing care of HTN, hyperlipidemia, and recent fall. She was going to the [employer] and reached for the door handle but missed it and fell backwards and hit her head. She had a laceration on her scalp but no other significant injuries. No preceding cardiac symptoms. No lightheadedness.     O) /69  Pulse 71  Temp 97.6  F (36.4  C) (Oral)  Ht 1.665 m (5' 5.55\")  Wt 63.5 kg (140 lb)  SpO2 95%  Breastfeeding? No  BMI 22.91 kg/m2  Nl strength exam, nl mentation, negative romberg  Nl heart exam- RRR no MRG  Nl lung exam, good air entry bilaterally, no rales or wheezes  Nl skin exam  Nl HEENT exam  Nl abd exam    A/P) 1) Falls. Balance problems are main concern. Also has had progressive weakness in legs. Given she's on a statin will check CK and consider a drug holiday. Will also check urine MMA to r/o B12 deficiency.Thyroid has been fine. Head CT ok. PMR referral for evaluation and targeted strengthening and balance regimen is recommended.  2) HTN. BP initially elevated on 5 mg lisinopril daily. Normalized on recheck. Follow clinically and repeat BMP next draw.     Over 10 min of 15 min visit spent in care coordination and counseling related to the above issues.    Nickolas Davenport MD, FACP, FAAP    "

## 2018-02-28 DIAGNOSIS — R26.89 BALANCE PROBLEMS: ICD-10-CM

## 2018-02-28 DIAGNOSIS — M62.81 GENERALIZED MUSCLE WEAKNESS: ICD-10-CM

## 2018-02-28 DIAGNOSIS — Z91.81 PERSONAL HISTORY OF FALL: ICD-10-CM

## 2018-02-28 DIAGNOSIS — I10 ESSENTIAL HYPERTENSION, BENIGN: ICD-10-CM

## 2018-02-28 LAB
CK SERPL-CCNC: 166 U/L (ref 30–225)
CREAT UR-MCNC: 54 MG/DL

## 2018-03-05 LAB — METHYLMALONATE/CREAT UR-SRTO: 1 MMOL/MOLCR (ref 0–3.6)

## 2018-04-06 ENCOUNTER — ALLIED HEALTH/NURSE VISIT (OUTPATIENT)
Dept: INTERNAL MEDICINE | Facility: CLINIC | Age: 83
End: 2018-04-06
Payer: COMMERCIAL

## 2018-04-06 DIAGNOSIS — H61.23 BILATERAL IMPACTED CERUMEN: Primary | ICD-10-CM

## 2018-04-06 NOTE — MR AVS SNAPSHOT
After Visit Summary   4/6/2018    Lynnette Mata    MRN: [id number]           Patient Information     Date Of Birth          1935        Visit Information        Provider Department      4/6/2018 2:30 PM Nurse, [facility]        Today's Diagnoses     Bilateral impacted cerumen    -  1       Follow-ups after your visit        Your next 10 appointments already scheduled     May 02, 2018 10:15 AM CDT   (Arrive by 10:00 AM)   MA SCREENING DIGITAL BILATERAL with URBCMA1   [facility] ([facility])    [address] [place] [address], [address]  [facility] [address] [phone]           Do not use any powder, lotion or deodorant under your arms or on your breast. If you do, we will ask you to remove it before your exam.  Wear comfortable, two-piece clothing.  If you have any allergies, tell your care team.  Bring any previous mammograms from other facilities or have them mailed to the breast center.            May 15, 2018  3:30 PM CDT   (Arrive by 3:15 PM)   New Patient Visit with Ariadne Tom MD   [facility] ([facility])    [address] [facility]  3rd Floor  [facility] [address] [phone]              Who to contact     Please call your clinic at [phone] to:    Ask questions about your health    Make or cancel appointments    Discuss your medicines    Learn about your test results    Speak to your doctor            Additional Information About Your Visit        Project Fixuphart Information     StyleUp gives you secure access to your electronic health record. If you see a primary care provider, you can also send messages to your care team and make appointments. If you have questions, please call your primary care clinic.  If you do not have a primary care provider, please call [phone] and they will assist you.      StyleUp is an electronic gateway that provides easy, online access to  your medical records. With [employer], you can request a clinic appointment, read your test results, renew a prescription or communicate with your care team.     To access your existing account, please contact your [facility] or call [phone] for assistance.        Care EveryWhere ID     This is your Care EveryWhere ID. This could be used by other organizations to access your [facility] medical records  [id number]         Blood Pressure from Last 3 Encounters:   02/26/18 122/69   12/28/17 (!) 156/91   10/17/17 136/72    Weight from Last 3 Encounters:   02/26/18 63.5 kg (140 lb)   12/28/17 66.8 kg (147 lb 4.8 oz)   10/17/17 65.4 kg (144 lb 1.6 oz)              We Performed the Following     REMOVE [facility]        Primary Care Provider Office Phone # Fax #    Nickolas Jose Francisco Davenport [phone] [address] [address]        Equal Access to Services     LUIS DANIEL [facility] : Hadii aad ku hadasho Soomaali, waaxda luqadaha, qaybta kaalmada adeegyada, waxay idiin hayphuong alonzo . So [facility] [phone].    ATENCIÓN: Si habla español, tiene a calabrese disposición servicios gratuitos de asistencia lingüística. Llame al [phone].    We comply with applicable federal civil rights laws and [place] laws. We do not discriminate on the basis of race, color, national origin, age, disability, sex, sexual orientation, or gender identity.            Thank you!     Thank you for choosing [facility]  for your care. Our goal is always to provide you with excellent care. Hearing back from our patients is one way we can continue to improve our services. Please take a few minutes to complete the written survey that you may receive in the mail after your visit with us. Thank you!             Your Updated Medication List - Protect others around you: Learn how to safely use, store and throw away your medicines at www.disposemymeds.org.           This list is accurate as of 4/6/18  3:19 PM.  Always use your most recent med list.                   Brand Name Dispense Instructions for use Diagnosis    aspirin 81 MG chewable tablet      Take 81 mg by mouth daily.        CALCIUM 600 PO      Take  by mouth daily.        fish oil-omega-3 fatty acids 1000 MG capsule      Take 2 g by mouth daily.        glucosamine 500 MG Caps      Take  by mouth daily.        lisinopril 10 MG tablet    PRINIVIL/ZESTRIL    90 tablet    Take 1 tablet (10 mg) by mouth daily    Essential hypertension, benign, Balance problems, Personal history of fall, Generalized muscle weakness       Multi-vitamin Tabs tablet   Generic drug:  multivitamin, therapeutic with minerals      Take 1 tablet by mouth daily.        simvastatin 20 MG tablet    ZOCOR    90 tablet    Take 1 tablet (20 mg) by mouth daily    Familial hypercholesterolemia, Atypical chest pain

## 2018-04-06 NOTE — NURSING NOTE
Chief Complaint   Patient presents with     Cerumen (impacted)     Patient here for ear wash.      Dain Hodge CMA at 3:16 PM on 4/6/2018.    Lynnette Audreydewayne Mata comes into clinic today at the request of Dr. [place] Davenport Ordering Provider for Ear Wash.    This service provided today was under the supervising provider of the day Dr. Eddie Orellana, who was available if needed.    Dain Hodge CMA at 3:18 PM on 4/6/2018    Irrigated both ears with luke warm water and hydrogen peroxide. Obtained moderate amount of brown cerumen from both ear irrigation. Patient tolerated the procedure well.      Dain Hodge CMA at 3:18 PM on 4/6/2018

## 2018-05-02 ENCOUNTER — RADIANT APPOINTMENT (OUTPATIENT)
Dept: MAMMOGRAPHY | Facility: CLINIC | Age: 83
End: 2018-05-02
Attending: INTERNAL MEDICINE
Payer: MEDICARE

## 2018-05-02 DIAGNOSIS — Z12.31 VISIT FOR SCREENING MAMMOGRAM: ICD-10-CM

## 2018-05-02 PROCEDURE — 77067 SCR MAMMO BI INCL CAD: CPT

## 2018-05-15 ENCOUNTER — OFFICE VISIT (OUTPATIENT)
Dept: PHYSICAL MEDICINE AND REHAB | Facility: CLINIC | Age: 83
End: 2018-05-15
Payer: COMMERCIAL

## 2018-05-15 VITALS — DIASTOLIC BLOOD PRESSURE: 69 MMHG | HEIGHT: 67 IN | SYSTOLIC BLOOD PRESSURE: 156 MMHG | HEART RATE: 70 BPM

## 2018-05-15 DIAGNOSIS — M21.061 ACQUIRED GENU VALGUM OF BOTH KNEES: Primary | ICD-10-CM

## 2018-05-15 DIAGNOSIS — M21.062 ACQUIRED GENU VALGUM OF BOTH KNEES: Primary | ICD-10-CM

## 2018-05-15 DIAGNOSIS — Z74.09 IMPAIRED FUNCTIONAL MOBILITY, BALANCE, GAIT, AND ENDURANCE: ICD-10-CM

## 2018-05-15 ASSESSMENT — PAIN SCALES - GENERAL: PAINLEVEL: NO PAIN (0)

## 2018-05-15 NOTE — PROGRESS NOTES
"       PM&R Clinic Note   Patient Name: Lynnette Mata : 1935 Medical Record: [id number]     Requesting Physician/clinician: Dr. Davenport           History of Present Illness:     Lynnette Mata is a 82 year old female who presents for evaluation of multiple years of progressive slowing of her gait, along with lower extremity weakness and pain. She was seen in our clinic previously for similar concerns, by Dr. Yanez in  and Dr. Perry in , at which point her symptoms seem to have been attributed to osteoarthritis of the knees and deconditioning. Today, Ms. Mata reports that her condition has continued to decline, and so she presents seeking further evaluation and recommendations.    Currently, patient is most bothered by her slowed gait. She reports that over multiple years, her walking has become progressively more slow. She additionally describes pain and weakness of both legs. The pain is a \"soreness\" throughout the entirety of both legs, which is worse with exertion such as walking or especially climbing stairs. It seems to her like a muscular rather than a joint pain. She does also have some midline low back pain that comes and goes, which she feels is likely \"arthritic.\" She denies any shooting pains down her legs, or any burning, numbness/tingling, or loss of sensation. Regarding her balance, she states that she \"has to work to stay upright,\" but she denies any dizziness, lightheadedness, or sensation of spinning. She recounts her fall several months ago in 2017, in which she \"reached for the door handle and missed\" and ended up falling and hitting her head (see notes from ED visit at that time for further details). Head CT was obtained which was negative.     Functionally, patient lives alone in her home and performs house and yard work with little difficulty, though she notes that she sometimes has trouble getting up off the ground if out in the yard gardening. She attends a " "\"strength training\" exercise class twice weekly, which she started doing about a month ago. Several years ago, she was doing a half hour of exercise 5 days a week, including a good deal of walking, but more recently she has stopped walking and feels she has been less active. She does continue to complete her ADLs and go out with her friends regularly. She notes that she takes tylenol most days which helps her significantly. As far as other therapies go, she did attend a few sessions of PT in 2014 (when referred by Dr. Yanez) but has not done any specific therapy since then.    Patient does endorse some weight loss, perhaps 5 pounds, in recent months, but she denies other constitutional symptoms including fevers, chills, and night sweats. She has no numbness/tingling or loss of sensation, and no loss of bowel or bladder function (with the exception of some occasion stress incontinence, losing a dribble of urine when she coughs). She has no history of malignancy.           Past Medical and Surgical History:     Past Medical History:   Diagnosis Date     Asymptomatic menopausal state age 45    Hx HRT use x 6+ years--off with [facility] study findings     Shingles outbreak 2/14/2013    Despite immunization at age 72!        Stricture and stenosis of cervix      Unspecified constipation     hx chronic     Past Surgical History:   Procedure Laterality Date     COLONOSCOPY  4.15.09    (Fiberoptic) WNL -- ami in 10 yrs            Social History:     Marital Status: ,  passed away 1 year ago due to Parkinson's  Living situation: Lives alone in a house with yard  Family support: Has no children, but has many friends whom she sees regularly  Vocational History: Was formerly a  at [employer], now retired         Functional history:     Lynnette Mata is independent with all aspects of daily living. She lives alone and completes house and yard work and cooking without assistance. She does not use any assistive " "devices.         Family History:     Family History   Problem Relation Age of Onset     Myocardial Infarction Mother 74     CEREBROVASCULAR DISEASE Maternal Grandmother 74     Cancer - colorectal Maternal Aunt 68     Endocrine Disease Brother 10     Type I Diabetes Mellitus     Psychotic Disorder Brother      bipolar     C.A.D. Brother      C.A.D. Father      Psychotic Disorder Father 49     suicide     DIABETES Maternal Grandfather 65     DMII     Parkinsonism Other                  Medications:     Current Outpatient Prescriptions   Medication Sig Dispense Refill     aspirin 81 MG chewable tablet Take 81 mg by mouth daily.       Calcium Carbonate (CALCIUM 600 PO) Take  by mouth daily.       fish oil-omega-3 fatty acids (FISH OIL) 1000 MG capsule Take 2 g by mouth daily.       Glucosamine 500 MG CAPS Take  by mouth daily.       lisinopril (PRINIVIL/ZESTRIL) 10 MG tablet Take 1 tablet (10 mg) by mouth daily 90 tablet 3     Multiple Vitamin (MULTI-VITAMIN) per tablet Take 1 tablet by mouth daily.       simvastatin (ZOCOR) 20 MG tablet Take 1 tablet (20 mg) by mouth daily 90 tablet 3            Allergies:     Allergies   Allergen Reactions     Nkda [No Known Drug Allergies]               ROS:     A focused ROS is negative other than the symptoms noted above in the HPI.         Physical Examiniation:   VITAL SIGNS: /69 (BP Location: Right arm, Patient Position: Chair, Cuff Size: Adult Regular)  Pulse 70  Ht 1.702 m (5' 7\")  BMI: Estimated body mass index is 22.91 kg/(m^2) as calculated from the following:    Height as of 2/26/18: 1.665 m (5' 5.55\").    Weight as of 2/26/18: 63.5 kg (140 lb).    Gen: NAD, pleasant and cooperative   HEENT: Normocepalic, without obvious abnormality  Cardio: regular pulse  Pulm: non-labored breathing in room air  Ext: WWP, no edema in BLE  Neuro: Alert and oriented, and moves all extremities independently. Ambulates independently with a somewhat shuffling gait and visible " valgus deformity of knees. Muscle strength is symmetric and 5/5 throughout lower extremities bilaterally (was able to stand/walk on toes and heels). Reflexes are +2 in upper extremities bilaterally (biceps and brachioradialis), +3 at patellar tendons bilaterally, and +1-2 at Achilles tendons bilaterally. Downward Babinski bilaterally, and no increase in muscle tone or spasticity. + paratonia.  Proprioception, light touch, and pinprick sensation intact in both lower extremities.  MSK: Good range of motion of spine and both lower extremities, without pain. No tenderness of palpation of the spine or paraspinal muscles. Negative straight leg raise, REINA, FADIR and hip scour b/l. Negative [place] forward bend test.           Laboratory/Imaging:     None           Assessment/Plan:   82 year old female who presents with progressive leg weakness and pain, and slowed gait, developing over multiple years.  PMH significant only for hypertension and hyperlipidemia.    Etiology of patient's symptoms is unclear, and could be of either muscular or neurologic etiology. The differential is quite broad but may include cervical myelopathy, a central impairment in balance (brain vs middle ear vs both), or simply a mechanical effect of the valgus deformity of her knees seen on exam. Additionally, she may have no pathology at all and her symptoms may be merely an effect of aging. She does not exhibit any red flag symptoms such as significant weight loss or other constitutional symptoms, midline spine tenderness, or loss of bowel or bladder function. She lacks any significant neurologic symptoms or signs on exam, with the only remarkable exam finding being her valgus knees and impaired gait. Laboratory workup by her PCP has included CK, TSH, urine MMA for B12 deficiency, all of which have been normal.    The plan established with Ms. Mata at this visit is to start with physical therapy. A referral has been placed, and she plans to attend  PT for focused exercise and further evaluation. She will then return to clinic in 8-10 weeks, and if her gait and weakness do not show any signs of improvement, would consider further work-up at that time. This could include EMG to evaluate for muscular or neurologic abnormality, cervical spine MRI for cervical myelopathy, and possibly referral to neurology. Also consider to obtain xray from her knees and       1. Patient education: In depth discussion and education was provided about the assessment and implications of each of the below recommendations for management. Patient indicated readiness to learn, all questions were answered and understanding of material presented was confirmed.  2. Work-up: None at this time.   3. Therapy/equipment/braces: Referral to physical therapy  4. Medications: None  5. Interventions: None  6. Referral to other providers: None today  7. Follow up: Return to clinic in 8-10 weeks (after establishing with PT)      Tess Jacobs MS4      Physician Attestation   I, Ariadne Tom, was present with the medical student who participated in the service and in the documentation of the note.  I have verified the history and personally performed the physical exam and medical decision making.  I agree with the assessment and plan of care as documented in the note.      I personally reviewed vital signs, medications and labs.    The above exam and plan was documented by me. No clear etiology for her symptoms at this point but definitely need more f/u and work-up in the future if don't improve with physical therapy. Back pain is minimal; no neck pain or other associated neurological symptoms. She has brisk patellar reflexes and obvious b/l knee valgus deformity. DDs as listed above. Will start with PT and f/u in 8-10 weeks.     Ariadne Tom MD  Date of Service (when I saw the patient): May 15, 2018    I spent a total of 50 minutes face-to-face with Lynnette Mata during today's office visit.  Over 50% of this time was spent counseling the patient and/or coordinating care. See note for details.

## 2018-05-15 NOTE — LETTER
"5/15/2018       RE: Lynnette Mata  [address]  [place] [address]     Dear Colleague,    Thank you for referring your patient, Lynnette Mata, to the [facility] PHYSICAL MEDICINE AND REHABILITATION at [facility]. Please see a copy of my visit note below.         PM&R Clinic Note   Patient Name: Lynnette Mata : 1935 Medical Record: [id number]     Requesting Physician/clinician: Dr. Davenport         History of Present Illness:   Lynnette Mata is a 82 year old female who presents for evaluation of multiple years of progressive slowing of her gait, along with lower extremity weakness and pain. She was seen in our clinic previously for similar concerns, by Dr. Yanez in  and Dr. Perry in , at which point her symptoms seem to have been attributed to osteoarthritis of the knees and deconditioning. Today, Ms. Mata reports that her condition has continued to decline, and so she presents seeking further evaluation and recommendations.    Currently, patient is most bothered by her slowed gait. She reports that over multiple years, her walking has become progressively more slow. She additionally describes pain and weakness of both legs. The pain is a \"soreness\" throughout the entirety of both legs, which is worse with exertion such as walking or especially climbing stairs. It seems to her like a muscular rather than a joint pain. She does also have some midline low back pain that comes and goes, which she feels is likely \"arthritic.\" She denies any shooting pains down her legs, or any burning, numbness/tingling, or loss of sensation. Regarding her balance, she states that she \"has to work to stay upright,\" but she denies any dizziness, lightheadedness, or sensation of spinning. She recounts her fall several months ago in 2017, in which she \"reached for the door handle and missed\" and ended up falling and hitting her head (see notes from ED " "visit at that time for further details). Head CT was obtained which was negative.     Functionally, patient lives alone in her home and performs house and yard work with little difficulty, though she notes that she sometimes has trouble getting up off the ground if out in the yard gardening. She attends a \"strength training\" exercise class twice weekly, which she started doing about a month ago. Several years ago, she was doing a half hour of exercise 5 days a week, including a good deal of walking, but more recently she has stopped walking and feels she has been less active. She does continue to complete her ADLs and go out with her friends regularly. She notes that she takes tylenol most days which helps her significantly. As far as other therapies go, she did attend a few sessions of PT in 2014 (when referred by Dr. aYnez) but has not done any specific therapy since then.    Patient does endorse some weight loss, perhaps 5 pounds, in recent months, but she denies other constitutional symptoms including fevers, chills, and night sweats. She has no numbness/tingling or loss of sensation, and no loss of bowel or bladder function (with the exception of some occasion stress incontinence, losing a dribble of urine when she coughs). She has no history of malignancy.           Past Medical and Surgical History:     Past Medical History:   Diagnosis Date     Asymptomatic menopausal state age 45    Hx HRT use x 6+ years--off with [facility] study findings     Shingles outbreak 2/14/2013    Despite immunization at age 72!        Stricture and stenosis of cervix      Unspecified constipation     hx chronic     Past Surgical History:   Procedure Laterality Date     COLONOSCOPY  4.15.09    (Fiberoptic) WNL -- ami in 10 yrs            Social History:     Marital Status: ,  passed away 1 year ago due to Parkinson's  Living situation: Lives alone in a house with yard  Family support: Has no children, but has many friends " "whom she sees regularly  Vocational History: Was formerly a  at [employer], now retired         Functional history:     Lynnette Mata is independent with all aspects of daily living. She lives alone and completes house and yard work and cooking without assistance. She does not use any assistive devices.         Family History:     Family History   Problem Relation Age of Onset     Myocardial Infarction Mother 74     CEREBROVASCULAR DISEASE Maternal Grandmother 74     Cancer - colorectal Maternal Aunt 68     Endocrine Disease Brother 10     Type I Diabetes Mellitus     Psychotic Disorder Brother      bipolar     C.A.D. Brother      C.A.D. Father      Psychotic Disorder Father 49     suicide     DIABETES Maternal Grandfather 65     DMII     Parkinsonism Other                  Medications:     Current Outpatient Prescriptions   Medication Sig Dispense Refill     aspirin 81 MG chewable tablet Take 81 mg by mouth daily.       Calcium Carbonate (CALCIUM 600 PO) Take  by mouth daily.       fish oil-omega-3 fatty acids (FISH OIL) 1000 MG capsule Take 2 g by mouth daily.       Glucosamine 500 MG CAPS Take  by mouth daily.       lisinopril (PRINIVIL/ZESTRIL) 10 MG tablet Take 1 tablet (10 mg) by mouth daily 90 tablet 3     Multiple Vitamin (MULTI-VITAMIN) per tablet Take 1 tablet by mouth daily.       simvastatin (ZOCOR) 20 MG tablet Take 1 tablet (20 mg) by mouth daily 90 tablet 3            Allergies:     Allergies   Allergen Reactions     Nkda [No Known Drug Allergies]               ROS:     A focused ROS is negative other than the symptoms noted above in the HPI.         Physical Examiniation:   VITAL SIGNS: /69 (BP Location: Right arm, Patient Position: Chair, Cuff Size: Adult Regular)  Pulse 70  Ht 1.702 m (5' 7\")  BMI: Estimated body mass index is 22.91 kg/(m^2) as calculated from the following:    Height as of 2/26/18: 1.665 m (5' 5.55\").    Weight as of 2/26/18: 63.5 kg (140 lb).    Gen: " NAD, pleasant and cooperative   HEENT: Normocepalic, without obvious abnormality  Cardio: regular pulse  Pulm: non-labored breathing in room air  Ext: WWP, no edema in BLE  Neuro: Alert and oriented, and moves all extremities independently. Ambulates independently with a somewhat shuffling gait and visible valgus deformity of knees. Muscle strength is symmetric and 5/5 throughout lower extremities bilaterally (was able to stand/walk on toes and heels). Reflexes are +2 in upper extremities bilaterally (biceps and brachioradialis), +3 at patellar tendons bilaterally, and +1-2 at Achilles tendons bilaterally. Downward Babinski bilaterally, and no increase in muscle tone or spasticity. + paratonia.  Proprioception, light touch, and pinprick sensation intact in both lower extremities.  MSK: Good range of motion of spine and both lower extremities, without pain. No tenderness of palpation of the spine or paraspinal muscles. Negative straight leg raise, REINA, FADIR and hip scour b/l. Negative [place] forward bend test.         Laboratory/Imaging:   None         Assessment/Plan:   82 year old female who presents with progressive leg weakness and pain, and slowed gait, developing over multiple years.  PMH significant only for hypertension and hyperlipidemia.    Etiology of patient's symptoms is unclear, and could be of either muscular or neurologic etiology. The differential is quite broad but may include cervical myelopathy, a central impairment in balance (brain vs middle ear vs both), or simply a mechanical effect of the valgus deformity of her knees seen on exam. Additionally, she may have no pathology at all and her symptoms may be merely an effect of aging. She does not exhibit any red flag symptoms such as significant weight loss or other constitutional symptoms, midline spine tenderness, or loss of bowel or bladder function. She lacks any significant neurologic symptoms or signs on exam, with the only remarkable exam  finding being her valgus knees and impaired gait. Laboratory workup by her PCP has included CK, TSH, urine MMA for B12 deficiency, all of which have been normal.    The plan established with Ms. Mata at this visit is to start with physical therapy. A referral has been placed, and she plans to attend PT for focused exercise and further evaluation. She will then return to clinic in 8-10 weeks, and if her gait and weakness do not show any signs of improvement, would consider further work-up at that time. This could include EMG to evaluate for muscular or neurologic abnormality, cervical spine MRI for cervical myelopathy, and possibly referral to neurology. Also consider to obtain xray from her knees and     1. Patient education: In depth discussion and education was provided about the assessment and implications of each of the below recommendations for management. Patient indicated readiness to learn, all questions were answered and understanding of material presented was confirmed.  2. Work-up: None at this time.   3. Therapy/equipment/braces: Referral to physical therapy  4. Medications: None  5. Interventions: None  6. Referral to other providers: None today  7. Follow up: Return to clinic in 8-10 weeks (after establishing with PT)  Tess Jacobs, MS4    Physician Attestation   I, Ariadne Tom, was present with the medical student who participated in the service and in the documentation of the note.  I have verified the history and personally performed the physical exam and medical decision making.  I agree with the assessment and plan of care as documented in the note.      I personally reviewed vital signs, medications and labs.    The above exam and plan was documented by me. No clear etiology for her symptoms at this point but definitely need more f/u and work-up in the future if don't improve with physical therapy. Back pain is minimal; no neck pain or other associated neurological symptoms. She has brisk  patellar reflexes and obvious b/l knee valgus deformity. DDs as listed above. Will start with PT and f/u in 8-10 weeks.     Date of Service (when I saw the patient): May 15, 2018    I spent a total of 50 minutes face-to-face with Lynnette Mata during today's office visit. Over 50% of this time was spent counseling the patient and/or coordinating care. See note for details.       Ariadne Tom MD

## 2018-05-15 NOTE — NURSING NOTE
Chief Complaint   Patient presents with     Consult     [facility]- NEUROLOGY     Mali Santoro MA

## 2018-05-15 NOTE — MR AVS SNAPSHOT
"              After Visit Summary   5/15/2018    Lynnette Mata    MRN: [id number]           Patient Information     Date Of Birth          1935        Visit Information        Provider Department      5/15/2018 3:30 PM Ariadne Tom MD [facility] Physical Medicine and Rehabilitation        Today's Diagnoses     Acquired genu valgum of both knees    -  1    Impaired functional mobility, balance, gait, and endurance           Follow-ups after your visit        Additional Services     PHYSICAL THERAPY REFERRAL       *This therapy referral will be filtered to a centralized scheduling office at [facility] and the patient will receive a call to schedule an appointment at a [facility] location most convenient for them. *     [facility] provides Physical Therapy evaluation and treatment and many specialty services across the [facility] system.  If requesting a specialty program, please choose from the list below.    If you have not heard from the scheduling office within 2 business days, please call [phone] for all locations, with the exception of [place], please call [phone] and [facility], please call [phone]  Treatment: Evaluation & Treatment    Please see PM&R clinic and call with questions  Please consider further balance testing if indicated    Please be aware that coverage of these services is subject to the terms and limitations of your health insurance plan.  Call member services at your health plan with any benefit or coverage questions.      **Note to Provider:  If you are referring outside of [facility] for the therapy appointment, please list the name of the location in the \"special instructions\" above, print the referral and give to the patient to schedule the appointment.                  Follow-up notes from your care team     Return in about 3 months (around 8/15/2018).      Your next 10 appointments already scheduled     May 25, 2018  1:00 PM " "CDT   Neuro Eval with Caterina Healy, PT   [facility] [place], Physical Therapy - Outpatient ([facility], [facility])    [address], [address]  [place] [address]   [phone]            Jul 10, 2018  4:30 PM CDT   (Arrive by 4:15 PM)   Return Visit with Ariadne Tom MD   [facility] Physical Medicine and Rehabilitation ([facility])    [address]  3rd Floor  [facility] [address] [phone]              Who to contact     Please call your clinic at [phone] to:    Ask questions about your health    Make or cancel appointments    Discuss your medicines    Learn about your test results    Speak to your doctor            Additional Information About Your Visit        [employer] Information     lang[employer]in gives you secure access to your electronic health record. If you see a primary care provider, you can also send messages to your care team and make appointments. If you have questions, please call your primary care clinic.  If you do not have a primary care provider, please call [phone] and they will assist you.      langtaojin is an electronic gateway that provides easy, online access to your medical records. With [employer]in, you can request a clinic appointment, read your test results, renew a prescription or communicate with your care team.     To access your existing account, please contact your [facility] or call [phone] for assistance.        Care EveryWhere ID     This is your Care EveryWhere ID. This could be used by other organizations to access your [place] medical records  [id number]        Your Vitals Were     Pulse Height                70 1.702 m (5' 7\")           Blood Pressure from Last 3 Encounters:   05/15/18 156/69   02/26/18 122/69   12/28/17 (!) 156/91    Weight from Last 3 Encounters:   02/26/18 63.5 kg (140 lb)   12/28/17 66.8 kg (147 lb 4.8 oz)   10/17/17 65.4 kg (144 lb 1.6 oz) "              We Performed the Following     PHYSICAL THERAPY REFERRAL        Primary Care Provider Office Phone # Fax #    Nickolas Jose Francisco Davenport [phone] [address] [address]        Equal Access to Services     WENDIE BRAUN : Jeff sal nuñez paulao Soomaali, waaxda luqadaha, qaybta kaalmada adekemal, katia moreaulamonte keys. So [facility] [phone].    ATENCIÓN: Si habla español, tiene a calabrese disposición servicios gratuitos de asistencia lingüística. Llame al [phone].    We comply with applicable federal civil rights laws and [place] laws. We do not discriminate on the basis of race, color, national origin, age, disability, sex, sexual orientation, or gender identity.            Thank you!     Thank you for choosing [facility] PHYSICAL MEDICINE AND REHABILITATION  for your care. Our goal is always to provide you with excellent care. Hearing back from our patients is one way we can continue to improve our services. Please take a few minutes to complete the written survey that you may receive in the mail after your visit with us. Thank you!             Your Updated Medication List - Protect others around you: Learn how to safely use, store and throw away your medicines at www.disposemymeds.org.          This list is accurate as of 5/15/18 11:59 PM.  Always use your most recent med list.                   Brand Name Dispense Instructions for use Diagnosis    aspirin 81 MG chewable tablet      Take 81 mg by mouth daily.        CALCIUM 600 PO      Take  by mouth daily.        fish oil-omega-3 fatty acids 1000 MG capsule      Take 2 g by mouth daily.        glucosamine 500 MG Caps      Take  by mouth daily.        lisinopril 10 MG tablet    PRINIVIL/ZESTRIL    90 tablet    Take 1 tablet (10 mg) by mouth daily    Essential hypertension, benign, Balance problems, Personal history of fall, Generalized muscle weakness       Multi-vitamin Tabs tablet   Generic  drug:  multivitamin, therapeutic with minerals      Take 1 tablet by mouth daily.        simvastatin 20 MG tablet    ZOCOR    90 tablet    Take 1 tablet (20 mg) by mouth daily    Familial hypercholesterolemia, Atypical chest pain

## 2018-05-25 ENCOUNTER — HOSPITAL ENCOUNTER (OUTPATIENT)
Dept: PHYSICAL THERAPY | Facility: CLINIC | Age: 83
Setting detail: THERAPIES SERIES
End: 2018-05-25
Attending: PHYSICAL MEDICINE & REHABILITATION
Payer: MEDICARE

## 2018-05-25 PROCEDURE — 97161 PT EVAL LOW COMPLEX 20 MIN: CPT | Mod: GP | Performed by: PHYSICAL THERAPIST

## 2018-05-25 PROCEDURE — 97110 THERAPEUTIC EXERCISES: CPT | Mod: GP | Performed by: PHYSICAL THERAPIST

## 2018-05-25 PROCEDURE — G8978 MOBILITY CURRENT STATUS: HCPCS | Mod: GP,CK | Performed by: PHYSICAL THERAPIST

## 2018-05-25 PROCEDURE — G8979 MOBILITY GOAL STATUS: HCPCS | Mod: GP,CI | Performed by: PHYSICAL THERAPIST

## 2018-05-25 PROCEDURE — 40000719 ZZHC STATISTIC PT DEPARTMENT NEURO VISIT: Performed by: PHYSICAL THERAPIST

## 2018-05-25 NOTE — PROGRESS NOTES
[facility]        OUTPATIENT PHYSICAL THERAPY FUNCTIONAL EVALUATION  PLAN OF TREATMENT FOR OUTPATIENT REHABILITATION  (COMPLETE FOR INITIAL CLAIMS ONLY)  Patient's Last Name, First Name, M.I.  YOB: 1935  Lynnette Mata     Provider's Name   [facility]   Medical Record No.  [id number]     Start of Care Date:  05/25/18   Onset Date:  05/15/18   Type:     _X__PT   ____OT  ____SLP Medical Diagnosis:  Acquired genu valgum of both knees/Impaired functional mobility, balance, gait, endurance     PT Diagnosis:  Impaired functional mobility, balance, gait, endurance Visits from SOC:  1                              __________________________________________________________________________________  Plan of Treatment/Functional Goals:  balance training, bed mobility training, gait training, joint mobilization, motor coordination training, neuromuscular re-education, ROM, strengthening, stretching, transfer training, manual therapy           GOALS  TUG  Pt will complete the TUG in less than 13.5 seconds in order to reduce her falls risk.  08/24/18    Gait speed  Pt will complete the 25-foot walk test in less than 8 seconds to improve her efficiency and safety of walking at home and in the community.  08/24/18    Dynamic balance  Pt will demonstrate improved dynamic balance as measured by an FGA score of at least 20/30, in order to improve her safety and reduce falls risk.  08/24/18    HEP  Pt will demonstrate independent performance of her home exercise program to maximize her ability to manage her condition and promote optimal wellness.  08/24/18                           Therapy Frequency:  1 time/week   Predicted Duration of Therapy Intervention:  90 days    Caterina Healy, PT                                    I CERTIFY THE NEED FOR THESE SERVICES FURNISHED UNDER         THIS PLAN OF TREATMENT AND WHILE UNDER MY CARE     (Physician co-signature of this document indicates review and certification of the therapy plan).                Certification Date From:  05/25/18   Certification Date To:  08/24/18    Referring Provider:  Ariadne Tom MD    Initial Assessment  See Epic Evaluation- Start of Care Date: 05/25/18

## 2018-05-25 NOTE — PROGRESS NOTES
05/25/18 1300   Quick Adds   Quick Adds Certification   Type of Visit Initial OP PT Evaluation   General Information   Start of Care Date 05/25/18   Referring Physician Ariadne Tom MD   Orders Evaluate and Treat as Indicated   Order Date 05/15/18   Medical Diagnosis Acquired genu valgum of both knees/Impaired functional mobility, balance, gait and endurance   Onset of illness/injury or Date of Surgery 05/15/18   Precautions/Limitations fall precautions   Surgical/Medical history reviewed Yes   Pertinent history of current problem (include personal factors and/or comorbidities that impact the POC) I've been having more pain and weakness in my legs, and I have a little arthritis in my back. I have difficulty staying upright. My walking speed is much slower, and my balance is off. I have to do stairs just one at a time and I must use a rail. Curbs are difficult. No injury or illness associated with decline in function, which has been going on in the past few months. I used to do some exercises about 5 or 6 years ago but I'm not good at following through. I've been going to an exercise class 1x/week - upper and lower body, led by .    Pertinent Visual History  Wears glasses. No major changes, gets eyes checked regularly.   Prior level of function comment Independent   Patient role/Employment history Retired  (Worked as a , enjoys reading, time with friends.)   Living environment House/townhome   Home/Community Accessibility Comments Stairs to basement for laundry, handrails. 2 EMESRON w/rail. Showers in shower stall in basement. Does have a grab bar in the upstairs/ground level bathroom (tub/shower).    Current Assistive Devices Standard Cane   Assistive Devices Comments Owns trekking poles as well.    Patient/Family Goals Statement Have better balance. Strengthen my legs.    General Information Comments Pt lives alone; her  passed away 1 year ago from PD. She has no children but does  "have many friends in the area. When asked about her interest in aquatic therapy, pt stated that it is \"not really for me\".    Fall Risk Screen   Fall screen completed by PT   Have you fallen 2 or more times in the past year? Yes   Have you fallen and had an injury in the past year? Yes   Timed Up and Go score (seconds) 17.51   Is patient a fall risk? Yes;Department fall risk interventions implemented   Fall screen comments 2 falls in the past year: December 2017 missed handle of door of [employer] and fell backwards, striking the back of her head. January 2018: Slid into wall at home while trying to put on her shoe.  TUG completed with no AD.  TUG score indicates pt is at risk for falls.   Pain   Patient currently in pain No   Pain comments Aching, sometimes sharp, \"muscle pain\", comes on when active. RLE>LLE for both pain and weakness. Variable onset. OTC Tylenol keeps me moving the way I do (daily) - takes 2 doses over 14 hours.    Vitals Signs   Heart Rate 72   Blood Pressure 123/61   Cognitive Status Examination   Orientation orientation to person, place and time   Level of Consciousness alert   Follows Commands and Answers Questions 100% of the time   Personal Safety and Judgment intact   Memory intact   Integumentary   Integumentary Comments Mild edema and redness noted bilat ankles. Knees could not be exposed due to fit of pants, but swelling and genu valgum deformities could be palpated through the pants leg.   Posture   Posture Comments Forward head, protracted shoulders, mild kyphosis, forward trunk lean/excess hip flexion, genu valgum bilat   Strength   Strength Comments Hip ext (supine MMT): 3/5 bilat. Hip abd: 3-/5 bilat. Hip flex: 4/5 bilat. Quads: 5/5 R, 4/5 L. DF: 5/5 bilat.   Gait   Gait Comments Pt walked into clinic without an AD today, but reports that she uses a SPC or trekking poles at times. Excess great toe extension noted from initial contact through terminal stance, bilat genu valgum, " excess hip flex/forward trunk lean, bilat Trendelenberg, excess trunk rotation to R in frontal plane, lack of coordinated trunk dissociation in general. Gait speed is slow, edita is inconsistent, shortened step lengths bilaterally with excess lateral movement on turns.   Gait Special Tests   Gait Special Tests 25 FOOT TIMED WALK;FUNCTIONAL GAIT ASSESSMENT   Gait Special Tests 25 Foot Timed Walk   Seconds 12.7   Steps 18 Steps   Comments No AD.   Gait Special Tests Functional Gait Assessment Score out of 30   Score out of 30 11   Comments Greatest difficulty with activities that require reduced NABOR. No AD used throughout FGA. FGA score less than or equal to 22 indicates risk for falls, less than or equal to 20/30 indicates risk of unexpected falls in the next 6 months.    Balance   Balance Comments Significant difficulty with decreased NABOR.   Planned Therapy Interventions   Planned Therapy Interventions balance training;bed mobility training;gait training;joint mobilization;motor coordination training;neuromuscular re-education;ROM;strengthening;stretching;transfer training;manual therapy   Clinical Impression   Criteria for Skilled Therapeutic Interventions Met yes, treatment indicated   PT Diagnosis Impaired functional mobility, balance, gait, endurance   Influenced by the following impairments Pain, weakness, decreased balance, decreased endurance   Functional limitations due to impairments High falls risk, decreased home and community mobility, decreased gait speed and stability, impaired ability to complete stairs, daily home care activities, floor transfers.   Clinical Presentation Evolving/Changing   Clinical Presentation Rationale Clinical expertise/observation, 25 foot walk test, TUG, FGA   Clinical Decision Making (Complexity) Low complexity   Therapy Frequency 1 time/week   Predicted Duration of Therapy Intervention (days/wks) 90 days   Risk & Benefits of therapy have been explained Yes   Patient, Family  & other staff in agreement with plan of care Yes   Clinical Impression Comments Pt is an 81 y/o female who presents with progressively worsening bilat LE weakness and pain, resulting in impaired balance, slowed gait speed, reduced functional mobility, and increased falls risk. Pt would benefit from skilled PT to address her balance, strength, endurance, pain, ROM, and functional mobility, in order to optimize her ability to safely complete activities at home and to reduce falls risk and any potential complications.   GOALS   PT Eval Goals 1;2;3;4   Goal 1   Goal Identifier TUG   Goal Description Pt will complete the TUG in less than 13.5 seconds in order to reduce her falls risk.   Target Date 08/24/18   Goal 2   Goal Identifier Gait speed   Goal Description Pt will complete the 25-foot walk test in less than 8 seconds to improve her efficiency and safety of walking at home and in the community.   Target Date 08/24/18   Goal 3   Goal Identifier Dynamic balance   Goal Description Pt will demonstrate improved dynamic balance as measured by an FGA score of at least 20/30, in order to improve her safety and reduce falls risk.   Target Date 08/24/18   Goal 4   Goal Identifier HEP   Goal Description Pt will demonstrate independent performance of her home exercise program to maximize her ability to manage her condition and promote optimal wellness.   Target Date 08/24/18   Total Evaluation Time   Total Evaluation Time (Minutes) 50   Therapy Certification   Certification date from 05/25/18   Certification date to 08/24/18   Medical Diagnosis Acquired genu valgum of both knees/Impaired functional mobility, balance, gait, endurance   Certification I certify the need for these services furnished under this plan of treatment and while under my care.  (Physician co-signature of this document indicates review and certification of the therapy plan).

## 2018-05-29 ENCOUNTER — HOSPITAL ENCOUNTER (OUTPATIENT)
Dept: PHYSICAL THERAPY | Facility: CLINIC | Age: 83
Setting detail: THERAPIES SERIES
End: 2018-05-29
Attending: PHYSICAL MEDICINE & REHABILITATION
Payer: MEDICARE

## 2018-05-29 PROCEDURE — 97112 NEUROMUSCULAR REEDUCATION: CPT | Mod: GP | Performed by: PHYSICAL THERAPIST

## 2018-05-29 PROCEDURE — 97110 THERAPEUTIC EXERCISES: CPT | Mod: GP | Performed by: PHYSICAL THERAPIST

## 2018-05-29 PROCEDURE — 40000719 ZZHC STATISTIC PT DEPARTMENT NEURO VISIT: Performed by: PHYSICAL THERAPIST

## 2018-06-05 ENCOUNTER — HOSPITAL ENCOUNTER (OUTPATIENT)
Dept: PHYSICAL THERAPY | Facility: CLINIC | Age: 83
Setting detail: THERAPIES SERIES
End: 2018-06-05
Attending: PHYSICAL MEDICINE & REHABILITATION
Payer: MEDICARE

## 2018-06-05 PROCEDURE — 97110 THERAPEUTIC EXERCISES: CPT | Mod: GP | Performed by: PHYSICAL THERAPIST

## 2018-06-05 PROCEDURE — 40000719 ZZHC STATISTIC PT DEPARTMENT NEURO VISIT: Performed by: PHYSICAL THERAPIST

## 2018-06-05 PROCEDURE — 97140 MANUAL THERAPY 1/> REGIONS: CPT | Mod: GP | Performed by: PHYSICAL THERAPIST

## 2018-06-14 ENCOUNTER — HOSPITAL ENCOUNTER (OUTPATIENT)
Dept: PHYSICAL THERAPY | Facility: CLINIC | Age: 83
Setting detail: THERAPIES SERIES
End: 2018-06-14
Attending: PHYSICAL MEDICINE & REHABILITATION
Payer: MEDICARE

## 2018-06-14 PROCEDURE — 40000719 ZZHC STATISTIC PT DEPARTMENT NEURO VISIT: Performed by: PHYSICAL THERAPIST

## 2018-06-14 PROCEDURE — 97140 MANUAL THERAPY 1/> REGIONS: CPT | Mod: GP | Performed by: PHYSICAL THERAPIST

## 2018-06-14 PROCEDURE — 97110 THERAPEUTIC EXERCISES: CPT | Mod: GP | Performed by: PHYSICAL THERAPIST

## 2018-06-22 ENCOUNTER — HOSPITAL ENCOUNTER (OUTPATIENT)
Dept: PHYSICAL THERAPY | Facility: CLINIC | Age: 83
Setting detail: THERAPIES SERIES
End: 2018-06-22
Attending: PHYSICAL MEDICINE & REHABILITATION
Payer: MEDICARE

## 2018-06-22 PROCEDURE — 97110 THERAPEUTIC EXERCISES: CPT | Mod: GP | Performed by: PHYSICAL THERAPIST

## 2018-06-22 PROCEDURE — 40000719 ZZHC STATISTIC PT DEPARTMENT NEURO VISIT: Performed by: PHYSICAL THERAPIST

## 2018-06-22 PROCEDURE — 97140 MANUAL THERAPY 1/> REGIONS: CPT | Mod: GP | Performed by: PHYSICAL THERAPIST

## 2018-06-28 ENCOUNTER — HOSPITAL ENCOUNTER (OUTPATIENT)
Dept: PHYSICAL THERAPY | Facility: CLINIC | Age: 83
Setting detail: THERAPIES SERIES
End: 2018-06-28
Attending: PHYSICAL MEDICINE & REHABILITATION
Payer: MEDICARE

## 2018-06-28 PROCEDURE — 97110 THERAPEUTIC EXERCISES: CPT | Mod: GP | Performed by: PHYSICAL THERAPIST

## 2018-06-28 PROCEDURE — 40000719 ZZHC STATISTIC PT DEPARTMENT NEURO VISIT: Performed by: PHYSICAL THERAPIST

## 2018-06-28 PROCEDURE — 97112 NEUROMUSCULAR REEDUCATION: CPT | Mod: GP | Performed by: PHYSICAL THERAPIST

## 2018-07-09 ENCOUNTER — HOSPITAL ENCOUNTER (OUTPATIENT)
Dept: PHYSICAL THERAPY | Facility: CLINIC | Age: 83
Setting detail: THERAPIES SERIES
End: 2018-07-09
Attending: PHYSICAL MEDICINE & REHABILITATION
Payer: MEDICARE

## 2018-07-09 PROCEDURE — 40000719 ZZHC STATISTIC PT DEPARTMENT NEURO VISIT: Performed by: PHYSICAL THERAPIST

## 2018-07-09 PROCEDURE — 97110 THERAPEUTIC EXERCISES: CPT | Mod: GP | Performed by: PHYSICAL THERAPIST

## 2018-07-09 PROCEDURE — 97112 NEUROMUSCULAR REEDUCATION: CPT | Mod: GP | Performed by: PHYSICAL THERAPIST

## 2018-07-10 ENCOUNTER — RADIANT APPOINTMENT (OUTPATIENT)
Dept: GENERAL RADIOLOGY | Facility: CLINIC | Age: 83
End: 2018-07-10
Attending: PHYSICAL MEDICINE & REHABILITATION
Payer: COMMERCIAL

## 2018-07-10 ENCOUNTER — OFFICE VISIT (OUTPATIENT)
Dept: PHYSICAL MEDICINE AND REHAB | Facility: CLINIC | Age: 83
End: 2018-07-10
Payer: COMMERCIAL

## 2018-07-10 VITALS
HEART RATE: 67 BPM | HEIGHT: 66 IN | BODY MASS INDEX: 22.02 KG/M2 | SYSTOLIC BLOOD PRESSURE: 127 MMHG | WEIGHT: 137 LBS | DIASTOLIC BLOOD PRESSURE: 66 MMHG

## 2018-07-10 DIAGNOSIS — M21.062 ACQUIRED GENU VALGUM OF BOTH KNEES: ICD-10-CM

## 2018-07-10 DIAGNOSIS — M25.562 CHRONIC PAIN OF BOTH KNEES: Primary | ICD-10-CM

## 2018-07-10 DIAGNOSIS — G89.29 CHRONIC PAIN OF BOTH KNEES: ICD-10-CM

## 2018-07-10 DIAGNOSIS — M25.562 CHRONIC PAIN OF BOTH KNEES: ICD-10-CM

## 2018-07-10 DIAGNOSIS — M21.061 ACQUIRED GENU VALGUM OF BOTH KNEES: ICD-10-CM

## 2018-07-10 DIAGNOSIS — R26.89 IMPAIRMENT OF BALANCE: ICD-10-CM

## 2018-07-10 DIAGNOSIS — M25.561 CHRONIC PAIN OF BOTH KNEES: Primary | ICD-10-CM

## 2018-07-10 DIAGNOSIS — M25.561 CHRONIC PAIN OF BOTH KNEES: ICD-10-CM

## 2018-07-10 DIAGNOSIS — G89.29 CHRONIC PAIN OF BOTH KNEES: Primary | ICD-10-CM

## 2018-07-10 ASSESSMENT — PAIN SCALES - GENERAL: PAINLEVEL: NO PAIN (0)

## 2018-07-10 NOTE — LETTER
"7/10/2018       RE: Lynnette Mata  [address]  [place] [address]     Dear Colleague,    Thank you for referring your patient, Lynnette Mata, to the [facility] PHYSICAL MEDICINE AND REHABILITATION at [facility]. Please see a copy of my visit note below.    [facility]   PM&R clinic note        Interval history:   Lynnette Mata presents to clinic today for follow up reg her rehab needs.   She was seen in clinic on 5/15/18 for evaluation of multiple years of progressive slowing of her gait, along with lower extremity weakness and pain, referral by Dr. Davenport. She was seen in our clinic previously for similar concerns, by Dr. Yanez in 2014 and Dr. Perry in 2015, at which point her symptoms seem to have been attributed to osteoarthritis of the knees and deconditioning.     Background from initial consult note,   Currently, patient is most bothered by her slowed gait. She reports that over multiple years, her walking has become progressively more slow. She additionally describes pain and weakness of both legs. The pain is a \"soreness\" throughout the entirety of both legs, which is worse with exertion such as walking or especially climbing stairs. It seems to her like a muscular rather than a joint pain. She does also have some midline low back pain that comes and goes, which she feels is likely \"arthritic.\" She denies any shooting pains down her legs, or any burning, numbness/tingling, or loss of sensation. Regarding her balance, she states that she \"has to work to stay upright,\" but she denies any dizziness, lightheadedness, or sensation of spinning. She recounts her fall several months ago in 12/2017, in which she \"reached for the door handle and missed\" and ended up falling and hitting her head (see notes from ED visit at that time for further details). Head CT was obtained which was negative.     Functionally, patient " "lives alone in her home and performs house and yard work with little difficulty, though she notes that she sometimes has trouble getting up off the ground if out in the yard gardening. She attends a \"strength training\" exercise class twice weekly, which she started doing about a month ago. Several years ago, she was doing a half hour of exercise 5 days a week, including a good deal of walking, but more recently she has stopped walking and feels she has been less active. She does continue to complete her ADLs and go out with her friends regularly. She notes that she takes tylenol most days which helps her significantly. As far as other therapies go, she did attend a few sessions of PT in 2014 (when referred by Dr. Yanez) but has not done any specific therapy since then.    Patient does endorse some weight loss, perhaps 5 pounds, in recent months, but she denies other constitutional symptoms including fevers, chills, and night sweats. She has no numbness/tingling or loss of sensation, and no loss of bowel or bladder function (with the exception of some occasion stress incontinence, losing a dribble of urine when she coughs). She has no history of malignancy.    Recommendations included,  1. Work-up: None at this time.   2. Therapy/equipment/braces: Referral to physical therapy  3. Medications: None  4. Interventions: None  5. Referral to other providers: None today  6. Follow up: Return to clinic in 8-10 weeks (after establishing with PT)       Medical issues since last visit,  No ER visit or hospitalization.       Symptoms,  Her symptoms are mostly unchanged and not worse. No new weakness or paresthesia. Takes tylenol to help with pain at knees and bilateral lower extremities; 650mg ER x2 in the morning and 325mg x1 in the afternoon. Her appetite is unchanged but she has lost another 3 lbs since her visit in May. No falls.       Therapies/HEP,  Started PT which has been beneficial for her balance.       Functionally, " "she is independent with all aspects of daily living.      Social history is unchanged,   Marital Status: ,  passed away 1 year ago due to Parkinson's  Living situation: Lives alone in a house with yard  Family support: Has no children, but has many friends whom she sees regularly  Vocational History: Was formerly a  at [employer], now retired        Medications:  Current Outpatient Prescriptions   Medication Sig Dispense Refill     aspirin 81 MG chewable tablet Take 81 mg by mouth daily.       Calcium Carbonate (CALCIUM 600 PO) Take  by mouth daily.       fish oil-omega-3 fatty acids (FISH OIL) 1000 MG capsule Take 2 g by mouth daily.       Glucosamine 500 MG CAPS Take  by mouth daily.       lisinopril (PRINIVIL/ZESTRIL) 10 MG tablet Take 1 tablet (10 mg) by mouth daily 90 tablet 3     Multiple Vitamin (MULTI-VITAMIN) per tablet Take 1 tablet by mouth daily.       simvastatin (ZOCOR) 20 MG tablet Take 1 tablet (20 mg) by mouth daily 90 tablet 3              Physical Exam:   /66  Pulse 67  Ht 1.67 m (5' 5.75\")  Wt 62.1 kg (137 lb)  BMI 22.28 kg/m2  Gen: NAD, pleasant and cooperative   Cardio: regular pulse  Pulm: non-labored breathing in room air  Abd: benign  Ext: WWP, no edema in BLE, no tenderness in calves  Neuro/MSK:   Inspection: obvious b/l genu valgus deformity; no evidence of scoliosis, symmetric positioning of bilateral shoulders and iliac crests     Palpation: nTTP throughout thoracic to lumbar paraspinals, ASISes, PSISes, GTs, ITs, piriformis muscles     ROM: full functional painless ROM of bilateral hips, ankles, and lumbar spine. Knees with limited ROM and associated with pain    Strength: Bilateral HF, KE, KF, DF, EHL, PF, inversion and eversion 5/5; able to stand/walk on toes and heels.    Sensation: Intact to light touch bilaterally along L3, L4, L5, S1, S2. Absent vibration at great toes b/l and diminished at ankles > knees b/l. Intact proprioception at great toes " b/l.    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    The etiology of her symptoms is not clear. The differential is quite broad but may include cervical myelopathy, a central impairment in balance (brain vs middle ear vs both), secondary to possible neuropathy, or simply a mechanical effect of the valgus deformity of her knees seen on exam. Laboratory workup by her PCP has included CK, TSH, urine MMA for B12 deficiency, all of which have been normal.      1. Work-up: bilateral knee xrays today. EMG for more evaluation of possible neuropathy   2. Therapy/equipment/braces: continue PT and HEP  3. Medications: ok to continue tylenol (total dose ~ 1800mg per day)  4. Interventions: none   5. Referrals: recommended referral to neurology clinic for more evaluation of any underlying neurological disorder; she would like to finish therapies and do EMG first.   6. Follow up: pending EMG results; will contact when the results are available.       Ariadne Tom MD  Physical Medicine & Rehabilitation

## 2018-07-10 NOTE — MR AVS SNAPSHOT
After Visit Summary   7/10/2018    Lynnette Mata    MRN: [id number]           Patient Information     Date Of Birth          1935        Visit Information        Provider Department      7/10/2018 4:30 PM Ariadne Tom MD [facility] Physical Medicine and Rehabilitation        Today's Diagnoses     Chronic pain of both knees    -  1    Acquired genu valgum of both knees        Impairment of balance           Follow-ups after your visit        Your next 10 appointments already scheduled     Jul 19, 2018  1:00 PM CDT   Neuro Treatment with Caterina Healy, PT   [facility] [place], Physical Therapy - Outpatient ([facility])    [address], [address]  [place] [address]   [phone]            Jul 26, 2018  1:00 PM CDT   Neuro Treatment with Caterina Healy, PRIMO   [facility] [place], Physical Therapy - Outpatient ([facility])    [address], [address]  [place] [address]   [phone]            Jul 27, 2018 11:30 AM CDT   (Arrive by 11:15 AM)   EMG with Ariadne Tom MD   [facility] Physical Medicine and Rehabilitation ([facility] [place])    [address] [place] [address] [id number] [phone]            Aug 02, 2018  1:00 PM CDT   Neuro Treatment with Caterina Healy, PRIMO   [facility] [place], Physical Therapy - Outpatient ([facility])    [address], [address]  [place] [address]   [phone]              Future tests that were ordered for you today     Open Future Orders        Priority Expected Expires Ordered    EMG Routine  7/10/2019 7/10/2018    XR Knee AP/Lat Standing Bilateral Routine 7/10/2018 7/10/2019 7/10/2018            Who to contact     Please call your clinic at [phone] to:    Ask questions about your health    Make or cancel appointments    Discuss your medicines    Learn about your test  "results    Speak to your doctor            Additional Information About Your Visit        [employer] Information     [employer] gives you secure access to your electronic health record. If you see a primary care provider, you can also send messages to your care team and make appointments. If you have questions, please call your primary care clinic.  If you do not have a primary care provider, please call [phone] and they will assist you.      [employer] is an electronic gateway that provides easy, online access to your medical records. With [employer], you can request a clinic appointment, read your test results, renew a prescription or communicate with your care team.     To access your existing account, please contact your [facility] or call [phone] for assistance.        Care EveryWhere ID     This is your Care EveryWhere ID. This could be used by other organizations to access your [facility] medical records  [id number]        Your Vitals Were     Pulse Height BMI (Body Mass Index)             67 1.67 m (5' 5.75\") 22.28 kg/m2          Blood Pressure from Last 3 Encounters:   07/10/18 127/66   05/15/18 156/69   02/26/18 122/69    Weight from Last 3 Encounters:   07/10/18 62.1 kg (137 lb)   02/26/18 63.5 kg (140 lb)   12/28/17 66.8 kg (147 lb 4.8 oz)               Primary Care Provider Office Phone # Fax #    Nickolas Jose Francisco Davenport [phone] [address] [address]        Equal Access to Services     WENDIE BRAUN AH: Hadii aad ku hadasho Soomaali, waaxda luqadaha, qaybta kaalmada adeegyada, waxpaloma john hayphuong healy'phuong . So [facility] [phone].    ATENCIÓN: Si habla español, tiene a calabrese disposición servicios gratuitos de asistencia lingüística. Llame al [phone].    We comply with applicable federal civil rights laws and [place] laws. We do not discriminate on the basis of race, color, national origin, age, disability, sex, " sexual orientation, or gender identity.            Thank you!     Thank you for choosing [facility] PHYSICAL MEDICINE AND REHABILITATION  for your care. Our goal is always to provide you with excellent care. Hearing back from our patients is one way we can continue to improve our services. Please take a few minutes to complete the written survey that you may receive in the mail after your visit with us. Thank you!             Your Updated Medication List - Protect others around you: Learn how to safely use, store and throw away your medicines at www.disposemymeds.org.          This list is accurate as of 7/10/18  5:04 PM.  Always use your most recent med list.                   Brand Name Dispense Instructions for use Diagnosis    aspirin 81 MG chewable tablet      Take 81 mg by mouth daily.        CALCIUM 600 PO      Take  by mouth daily.        fish oil-omega-3 fatty acids 1000 MG capsule      Take 2 g by mouth daily.        glucosamine 500 MG Caps      Take  by mouth daily.        lisinopril 10 MG tablet    PRINIVIL/ZESTRIL    90 tablet    Take 1 tablet (10 mg) by mouth daily    Essential hypertension, benign, Balance problems, Personal history of fall, Generalized muscle weakness       Multi-vitamin Tabs tablet   Generic drug:  multivitamin, therapeutic with minerals      Take 1 tablet by mouth daily.        simvastatin 20 MG tablet    ZOCOR    90 tablet    Take 1 tablet (20 mg) by mouth daily    Familial hypercholesterolemia, Atypical chest pain

## 2018-07-12 NOTE — PROGRESS NOTES
"[facility]   PM&R clinic note        Interval history:   Lynnette Mata presents to clinic today for follow up reg her rehab needs.   She was seen in clinic on 5/15/18 for evaluation of multiple years of progressive slowing of her gait, along with lower extremity weakness and pain, referral by Dr. Davenport. She was seen in our clinic previously for similar concerns, by Dr. Yanez in 2014 and Dr. Perry in 2015, at which point her symptoms seem to have been attributed to osteoarthritis of the knees and deconditioning.     Background from initial consult note,   Currently, patient is most bothered by her slowed gait. She reports that over multiple years, her walking has become progressively more slow. She additionally describes pain and weakness of both legs. The pain is a \"soreness\" throughout the entirety of both legs, which is worse with exertion such as walking or especially climbing stairs. It seems to her like a muscular rather than a joint pain. She does also have some midline low back pain that comes and goes, which she feels is likely \"arthritic.\" She denies any shooting pains down her legs, or any burning, numbness/tingling, or loss of sensation. Regarding her balance, she states that she \"has to work to stay upright,\" but she denies any dizziness, lightheadedness, or sensation of spinning. She recounts her fall several months ago in 12/2017, in which she \"reached for the door handle and missed\" and ended up falling and hitting her head (see notes from ED visit at that time for further details). Head CT was obtained which was negative.     Functionally, patient lives alone in her home and performs house and yard work with little difficulty, though she notes that she sometimes has trouble getting up off the ground if out in the yard gardening. She attends a \"strength training\" exercise class twice weekly, which she started doing about a month ago. Several years ago, she " was doing a half hour of exercise 5 days a week, including a good deal of walking, but more recently she has stopped walking and feels she has been less active. She does continue to complete her ADLs and go out with her friends regularly. She notes that she takes tylenol most days which helps her significantly. As far as other therapies go, she did attend a few sessions of PT in 2014 (when referred by Dr. Yanez) but has not done any specific therapy since then.    Patient does endorse some weight loss, perhaps 5 pounds, in recent months, but she denies other constitutional symptoms including fevers, chills, and night sweats. She has no numbness/tingling or loss of sensation, and no loss of bowel or bladder function (with the exception of some occasion stress incontinence, losing a dribble of urine when she coughs). She has no history of malignancy.    Recommendations included,  1. Work-up: None at this time.   2. Therapy/equipment/braces: Referral to physical therapy  3. Medications: None  4. Interventions: None  5. Referral to other providers: None today  6. Follow up: Return to clinic in 8-10 weeks (after establishing with PT)       Medical issues since last visit,  No ER visit or hospitalization.       Symptoms,  Her symptoms are mostly unchanged and not worse. No new weakness or paresthesia. Takes tylenol to help with pain at knees and bilateral lower extremities; 650mg ER x2 in the morning and 325mg x1 in the afternoon. Her appetite is unchanged but she has lost another 3 lbs since her visit in May. No falls.       Therapies/HEP,  Started PT which has been beneficial for her balance.       Functionally, she is independent with all aspects of daily living.      Social history is unchanged,   Marital Status: ,  passed away 1 year ago due to Parkinson's  Living situation: Lives alone in a house with yard  Family support: Has no children, but has many friends whom she sees regularly  Vocational  "History: Was formerly a  at [employer], now retired        Medications:  Current Outpatient Prescriptions   Medication Sig Dispense Refill     aspirin 81 MG chewable tablet Take 81 mg by mouth daily.       Calcium Carbonate (CALCIUM 600 PO) Take  by mouth daily.       fish oil-omega-3 fatty acids (FISH OIL) 1000 MG capsule Take 2 g by mouth daily.       Glucosamine 500 MG CAPS Take  by mouth daily.       lisinopril (PRINIVIL/ZESTRIL) 10 MG tablet Take 1 tablet (10 mg) by mouth daily 90 tablet 3     Multiple Vitamin (MULTI-VITAMIN) per tablet Take 1 tablet by mouth daily.       simvastatin (ZOCOR) 20 MG tablet Take 1 tablet (20 mg) by mouth daily 90 tablet 3              Physical Exam:   /66  Pulse 67  Ht 1.67 m (5' 5.75\")  Wt 62.1 kg (137 lb)  BMI 22.28 kg/m2  Gen: NAD, pleasant and cooperative   Cardio: regular pulse  Pulm: non-labored breathing in room air  Abd: benign  Ext: WWP, no edema in BLE, no tenderness in calves  Neuro/MSK:   Inspection: obvious b/l genu valgus deformity; no evidence of scoliosis, symmetric positioning of bilateral shoulders and iliac crests     Palpation: nTTP throughout thoracic to lumbar paraspinals, ASISes, PSISes, GTs, ITs, piriformis muscles     ROM: full functional painless ROM of bilateral hips, ankles, and lumbar spine. Knees with limited ROM and associated with pain    Strength: Bilateral HF, KE, KF, DF, EHL, PF, inversion and eversion 5/5; able to stand/walk on toes and heels.    Sensation: Intact to light touch bilaterally along L3, L4, L5, S1, S2. Absent vibration at great toes b/l and diminished at ankles > knees b/l. Intact proprioception at great toes b/l.    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    The etiology of her symptoms is not clear. The differential is quite broad but may include cervical myelopathy, a central impairment in balance (brain vs middle ear vs both), secondary to possible neuropathy, or simply a mechanical effect of the valgus deformity of her knees seen on exam. Laboratory workup by her PCP has included CK, TSH, urine MMA for B12 deficiency, all of which have been normal.      1. Work-up: bilateral knee xrays today. EMG for more evaluation of possible neuropathy   2. Therapy/equipment/braces: continue PT and HEP  3. Medications: ok to continue tylenol (total dose ~ 1800mg per day)  4. Interventions: none   5. Referrals: recommended referral to neurology clinic for more evaluation of any underlying neurological disorder; she would like to finish therapies and do EMG first.   6. Follow up: pending EMG results; will contact when the results are available.       Ariadne Tom MD  Physical Medicine & Rehabilitation

## 2018-07-19 ENCOUNTER — HOSPITAL ENCOUNTER (OUTPATIENT)
Dept: PHYSICAL THERAPY | Facility: CLINIC | Age: 83
Setting detail: THERAPIES SERIES
End: 2018-07-19
Attending: PHYSICAL MEDICINE & REHABILITATION
Payer: MEDICARE

## 2018-07-19 PROCEDURE — 97110 THERAPEUTIC EXERCISES: CPT | Mod: GP | Performed by: PHYSICAL THERAPIST

## 2018-07-19 PROCEDURE — 40000719 ZZHC STATISTIC PT DEPARTMENT NEURO VISIT: Performed by: PHYSICAL THERAPIST

## 2018-07-26 ENCOUNTER — HOSPITAL ENCOUNTER (OUTPATIENT)
Dept: PHYSICAL THERAPY | Facility: CLINIC | Age: 83
Setting detail: THERAPIES SERIES
End: 2018-07-26
Attending: PHYSICAL MEDICINE & REHABILITATION
Payer: MEDICARE

## 2018-07-26 PROCEDURE — 97110 THERAPEUTIC EXERCISES: CPT | Mod: GP | Performed by: PHYSICAL THERAPIST

## 2018-07-26 PROCEDURE — 40000719 ZZHC STATISTIC PT DEPARTMENT NEURO VISIT: Performed by: PHYSICAL THERAPIST

## 2018-07-27 ENCOUNTER — OFFICE VISIT (OUTPATIENT)
Dept: PHYSICAL MEDICINE AND REHAB | Facility: CLINIC | Age: 83
End: 2018-07-27
Payer: COMMERCIAL

## 2018-07-27 ENCOUNTER — TELEPHONE (OUTPATIENT)
Dept: INTERNAL MEDICINE | Facility: CLINIC | Age: 83
End: 2018-07-27

## 2018-07-27 DIAGNOSIS — R26.89 IMPAIRMENT OF BALANCE: Primary | ICD-10-CM

## 2018-07-27 DIAGNOSIS — Z74.09 IMPAIRED FUNCTIONAL MOBILITY, BALANCE, GAIT, AND ENDURANCE: ICD-10-CM

## 2018-07-27 NOTE — PROGRESS NOTES
[facility]  Physical Medicine and Rehabilitation Clinic        Nerve Conduction & EMG Report          Patient:       Lynnette Mata  Patient ID:    [id number]  Gender:        Female  YOB: 1935  Age:           82 Years 10 Months        History & Examination:  Ms. Mata is an 82 year old female who presents with several years of progressively difficulties with her gait which has been accompanied by lower extremity weakness and pain. She has been seen in PM&R clinic for similar symptoms in the past in 2014, 2015 and most recently by Dr. Tom on 7/10/2018. Patient complains of progressive weakness and pain in both legs, though states that her right leg is worse than her left. She also continues to have some midline low back pain that comes and goes. Patient denies any shooting pain down her legs, burning or sensory symptoms in her legs. Symptoms are worse with walking or climbing stairs. Query peripheral neuropathy vs lumbar radiculopathy.     Techniques: Motor and sensory conduction studies were done with surface recording electrodes. Temperature was monitored and recorded throughout the study. Lower extremities were maintained at a temperature of 31degrees Centigrade or higher. EMG was done with a concentric needle electrode.        Results:  Bilateral sural antidromic sensory NCSs were normal.   Bilateral tibial and peroneal motor NCSs were normal.  Bilateral tibial F-wave minimal latencies were normal.     EMG of right lower extremity muscles and lumbosacral paraspinals was normal.         Interpretation:  -- Normal examination.  -- There is no electrodiagnostic evidence of diffuse peripheral neuropathy in the lower extremities.  -- There is no electrodiagnostic evidence of right lower extremity motor radiculopathy or plexopathy.      EMG Physician:  Ariadne Tom MD  Physical Medicine & Rehabilitation           Sensory NCS      Nerve / Sites Rec. Site Onset Peak NP Amp Ref. PP  Amp Dist Brett Ref. Temp     ms ms  V  V  V cm m/s m/s  C   R SURAL - Lat Mall 60      Calf Ankle 2.40 3.28 6.5 5.0 2.9 10 41.7 38.0 29.6   L SURAL - Lat Mall 60      Calf Ankle 2.29 3.18 8.8 5.0 8.4 14 61.1 38.0 29.3               Motor NCS      Nerve / Sites Rec. Site Lat Ref. Amp Ref. Rel Amp Dist Brett Ref. Dur. Area Temp.     ms ms mV mV % cm m/s m/s ms %  C   R DEEP PERONEAL - EDB 60      Ankle EDB 4.58 6.00 2.4 2.0 100 8   6.77 100 29.3      FibHead EDB 11.30  2.1  85.1 30 44.7 38.0 7.40 79.4 29.4      Pop Fos EDB 13.28  2.0  80.2 11 55.6 38.0 7.29 80.3 29.4   L DEEP PERONEAL - EDB 60      Ankle EDB 5.00 6.00 3.4 2.0 100 8   7.03 100 29.4      FibHead EDB 10.78  3.3  96.5 29 50.2 38.0 8.07 93.1 29.4      Pop Fos EDB 13.23  3.4  99.8 11.5 47.0 38.0 7.86 96.1 29.4   R TIBIAL - AH      Ankle AH 3.59 6.00 4.8 4.0 100 8   9.27 100 29.4      Pop Fos AH 12.08  3.1  64.9 39 45.9 38.0 10.42 77.7 29.4   L TIBIAL - AH      Ankle AH 3.33 6.00 7.5 4.0 100 8   8.33 100 29.3      Pop Fos AH 11.77  6.4  85.6 40 47.4 38.0 8.96 93.8 29.2       F  Wave      Nerve Min F Lat Max F Lat Mean FLat Temp.    ms ms ms  C   R TIBIAL 50.31 53.75 52.19 29.6   L TIBIAL 53.65 57.81 55.30 29       EMG Summary Table     Spontaneous MUAP Recruitment    IA Fib PSW Fasc H.F. Amp Dur. PPP Pattern   R. TIB ANTERIOR N None None None None N N N N   R. GASTROCN (MED) N None None None None N N N N   R. VAST LATERALIS N None None None None N N N N   R. BIC FEM (S HEAD) N None None None None N N N N   R. GLUTEUS MED N None None None None N N N N   R. L5 PSP N None None None None

## 2018-07-27 NOTE — LETTER
7/27/2018       RE: Lynnette Mata  [address]  [place] [address]     Dear Colleague,    Thank you for referring your patient, Lynnette Mata, to the [facility] PHYSICAL MEDICINE AND REHABILITATION at [facility]. Please see a copy of my visit note below.        [facility]  Physical Medicine and Rehabilitation Clinic        Nerve Conduction & EMG Report          Patient:       Lynnette Mata  Patient ID:    [id number]  Gender:        Female  YOB: 1935  Age:           82 Years 10 Months        History & Examination:  Ms. Mata is an 82 year old female who presents with several years of progressively difficulties with her gait which has been accompanied by lower extremity weakness and pain. She has been seen in PM&R clinic for similar symptoms in the past in 2014, 2015 and most recently by Dr. Tom on 7/10/2018. Patient complains of progressive weakness and pain in both legs, though states that her right leg is worse than her left. She also continues to have some midline low back pain that comes and goes. Patient denies any shooting pain down her legs, burning or sensory symptoms in her legs. Symptoms are worse with walking or climbing stairs. Query peripheral neuropathy vs lumbar radiculopathy.     Techniques: Motor and sensory conduction studies were done with surface recording electrodes. Temperature was monitored and recorded throughout the study. Lower extremities were maintained at a temperature of 31degrees Centigrade or higher. EMG was done with a concentric needle electrode.        Results:  Bilateral sural antidromic sensory NCSs were normal.   Bilateral tibial and peroneal motor NCSs were normal.  Bilateral tibial F-wave minimal latencies were normal.     EMG of right lower extremity muscles and lumbosacral paraspinals was normal.         Interpretation:  -- Normal examination.  -- There is no electrodiagnostic evidence of  diffuse peripheral neuropathy in the lower extremities.  -- There is no electrodiagnostic evidence of right lower extremity motor radiculopathy or plexopathy.      EMG Physician:  Ariadne Tom MD  Physical Medicine & Rehabilitation           Sensory NCS      Nerve / Sites Rec. Site Onset Peak NP Amp Ref. PP Amp Dist Brett Ref. Temp     ms ms  V  V  V cm m/s m/s  C   R SURAL - Lat Mall 60      Calf Ankle 2.40 3.28 6.5 5.0 2.9 10 41.7 38.0 29.6   L SURAL - Lat Mall 60      Calf Ankle 2.29 3.18 8.8 5.0 8.4 14 61.1 38.0 29.3               Motor NCS      Nerve / Sites Rec. Site Lat Ref. Amp Ref. Rel Amp Dist Brett Ref. Dur. Area Temp.     ms ms mV mV % cm m/s m/s ms %  C   R DEEP PERONEAL - EDB 60      Ankle EDB 4.58 6.00 2.4 2.0 100 8   6.77 100 29.3      FibHead EDB 11.30  2.1  85.1 30 44.7 38.0 7.40 79.4 29.4      Pop Fos EDB 13.28  2.0  80.2 11 55.6 38.0 7.29 80.3 29.4   L DEEP PERONEAL - EDB 60      Ankle EDB 5.00 6.00 3.4 2.0 100 8   7.03 100 29.4      FibHead EDB 10.78  3.3  96.5 29 50.2 38.0 8.07 93.1 29.4      Pop Fos EDB 13.23  3.4  99.8 11.5 47.0 38.0 7.86 96.1 29.4   R TIBIAL - AH      Ankle AH 3.59 6.00 4.8 4.0 100 8   9.27 100 29.4      Pop Fos AH 12.08  3.1  64.9 39 45.9 38.0 10.42 77.7 29.4   L TIBIAL - AH      Ankle AH 3.33 6.00 7.5 4.0 100 8   8.33 100 29.3      Pop Fos AH 11.77  6.4  85.6 40 47.4 38.0 8.96 93.8 29.2       F  Wave      Nerve Min F Lat Max F Lat Mean FLat Temp.    ms ms ms  C   R TIBIAL 50.31 53.75 52.19 29.6   L TIBIAL 53.65 57.81 55.30 29       EMG Summary Table     Spontaneous MUAP Recruitment    IA Fib PSW Fasc H.F. Amp Dur. PPP Pattern   R. TIB ANTERIOR N None None None None N N N N   R. GASTROCN (MED) N None None None None N N N N   R. VAST LATERALIS N None None None None N N N N   R. BIC FEM (S HEAD) N None None None None N N N N   R. GLUTEUS MED N None None None None N N N N   R. L5 PSP N None None None None                                Again, thank you for allowing me to  participate in the care of your patient.      Sincerely,    Ariadne Tom MD

## 2018-07-27 NOTE — TELEPHONE ENCOUNTER
"[facility] Call Center    Phone Message    May a detailed message be left on voicemail: yes    Reason for Call: Other: Pt requested Dr. Nickolas Davenport's nurse to call her TODAY, if possible, regarding \"difficulty sleeping.\"  Call pt on her home ph#.     Action Taken: Message routed to:  Other: Primary Care Clinic  "

## 2018-07-27 NOTE — MR AVS SNAPSHOT
After Visit Summary   7/27/2018    Lynnette Mata    MRN: [id number]           Patient Information     Date Of Birth          1935        Visit Information        Provider Department      7/27/2018 11:30 AM Ariadne Tom MD [facility] Physical Medicine and Rehabilitation        Today's Diagnoses     Impairment of balance    -  1    Impaired functional mobility, balance, gait, and endurance           Follow-ups after your visit        Follow-up notes from your care team     Return in about 6 months (around 1/27/2019).      Your next 10 appointments already scheduled     Aug 02, 2018  1:00 PM CDT   Neuro Treatment with Caterina Healy, PRIMO   [facility], [place], Physical Therapy - Outpatient ([facility])    [address], [address]  [place] [address]   [phone]            Aug 02, 2018  3:45 PM CDT   Return Visit with LUIS Morton CNM   [facility] ([facility])    [place] [facility] [address]  [address] [facility] [id number] [phone]            Oct 22, 2018 12:00 PM CDT   (Arrive by 11:45 AM)   PHYSICAL with Nickolas Davenport MD   [facility] ([facility])    [address]  [address] [facility] [address] [phone]              Who to contact     Please call your clinic at [phone] to:    Ask questions about your health    Make or cancel appointments    Discuss your medicines    Learn about your test results    Speak to your doctor            Additional Information About Your Visit        MyChart Information     Fastacasht gives you secure access to your electronic health record. If you see a primary care provider, you can also send messages to your care team and make appointments. If you have questions, please call your primary care clinic.  If you do not have a primary care provider, please call [phone] and  they will assist you.      Lastline is an electronic gateway that provides easy, online access to your medical records. With Lastline, you can request a clinic appointment, read your test results, renew a prescription or communicate with your care team.     To access your existing account, please contact your [facility] or call [phone] for assistance.        Care EveryWhere ID     This is your Care EveryWhere ID. This could be used by other organizations to access your [place] medical records  [id number]         Blood Pressure from Last 3 Encounters:   07/10/18 127/66   05/15/18 156/69   02/26/18 122/69    Weight from Last 3 Encounters:   07/10/18 62.1 kg (137 lb)   02/26/18 63.5 kg (140 lb)   12/28/17 66.8 kg (147 lb 4.8 oz)              We Performed the Following     HC NCS MOTOR W OR W/O F-WAVE, 7 OR 8     NEEDLE EMG 1 EXTREMITY        Primary Care Provider Office Phone # Fax #    Nickolas Jose Francisco Davenport [phone]       [address] [address]        Equal Access to Services     WENDIE BRAUN : Hadii aad ku hadasho Soshaanali, waaxda luqadaha, qaybta kaalmada adekemal, katia alonzo . So [facility] [phone].    ATENCIÓN: Si habla español, tiene a calabrese disposición servicios gratuitos de asistencia lingüística. [facility] [phone].    We comply with applicable federal civil rights laws and [place] laws. We do not discriminate on the basis of race, color, national origin, age, disability, sex, sexual orientation, or gender identity.            Thank you!     Thank you for choosing [facility] PHYSICAL MEDICINE AND REHABILITATION  for your care. Our goal is always to provide you with excellent care. Hearing back from our patients is one way we can continue to improve our services. Please take a few minutes to complete the written survey that you may receive in the mail after your visit with us. Thank you!              Your Updated Medication List - Protect others around you: Learn how to safely use, store and throw away your medicines at www.disposemymeds.org.          This list is accurate as of 7/27/18 11:59 PM.  Always use your most recent med list.                   Brand Name Dispense Instructions for use Diagnosis    aspirin 81 MG chewable tablet      Take 81 mg by mouth daily.        CALCIUM 600 PO      Take  by mouth daily.        fish oil-omega-3 fatty acids 1000 MG capsule      Take 2 g by mouth daily.        glucosamine 500 MG Caps      Take  by mouth daily.        lisinopril 10 MG tablet    PRINIVIL/ZESTRIL    90 tablet    Take 1 tablet (10 mg) by mouth daily    Essential hypertension, benign, Balance problems, Personal history of fall, Generalized muscle weakness       Multi-vitamin Tabs tablet   Generic drug:  multivitamin, therapeutic with minerals      Take 1 tablet by mouth daily.        simvastatin 20 MG tablet    ZOCOR    90 tablet    Take 1 tablet (20 mg) by mouth daily    Familial hypercholesterolemia, Atypical chest pain

## 2018-07-30 NOTE — TELEPHONE ENCOUNTER
[facility] Call Center    Phone Message    May a detailed message be left on voicemail: yes    Reason for Call: Other: Pt calling again to discuss recent trouble sleeping and she also states she's feeling a bit depressed. Please call back to discuss.     Action Taken: Message routed to:  Other: [facility]

## 2018-07-31 NOTE — TELEPHONE ENCOUNTER
[facility] Call Center    Phone Message    May a detailed message be left on voicemail: yes    Reason for Call: Other: Pt would like to discuss sleep and depression.     Action Taken: Message routed to:  [facility] ([facility] matthew franco

## 2018-07-31 NOTE — TELEPHONE ENCOUNTER
Patient having difficulty with low mood and inability to sleep.  She denies suicidal ideation.  Patient scheduled with first available for further evaluation.  Hannah Weldon LPN

## 2018-08-02 ENCOUNTER — OFFICE VISIT (OUTPATIENT)
Dept: OBGYN | Facility: CLINIC | Age: 83
End: 2018-08-02
Attending: ADVANCED PRACTICE MIDWIFE
Payer: MEDICARE

## 2018-08-02 ENCOUNTER — HOSPITAL ENCOUNTER (OUTPATIENT)
Dept: PHYSICAL THERAPY | Facility: CLINIC | Age: 83
Setting detail: THERAPIES SERIES
End: 2018-08-02
Attending: PHYSICAL MEDICINE & REHABILITATION
Payer: MEDICARE

## 2018-08-02 VITALS
WEIGHT: 140 LBS | BODY MASS INDEX: 22.5 KG/M2 | DIASTOLIC BLOOD PRESSURE: 73 MMHG | SYSTOLIC BLOOD PRESSURE: 137 MMHG | HEART RATE: 73 BPM | HEIGHT: 66 IN

## 2018-08-02 DIAGNOSIS — Z01.419 WELL WOMAN EXAM: Primary | ICD-10-CM

## 2018-08-02 PROCEDURE — 40000719 ZZHC STATISTIC PT DEPARTMENT NEURO VISIT: Performed by: PHYSICAL THERAPIST

## 2018-08-02 PROCEDURE — 97750 PHYSICAL PERFORMANCE TEST: CPT | Mod: GP | Performed by: PHYSICAL THERAPIST

## 2018-08-02 PROCEDURE — 97110 THERAPEUTIC EXERCISES: CPT | Mod: GP | Performed by: PHYSICAL THERAPIST

## 2018-08-02 PROCEDURE — G0463 HOSPITAL OUTPT CLINIC VISIT: HCPCS | Mod: ZF

## 2018-08-02 NOTE — LETTER
"8/2/2018       RE: Lynnette Mata  [address]  [place] [address]     Dear Colleague,    Thank you for referring your patient, Lynnette Mata, to the [facility] at [facility]. Please see a copy of my visit note below.    Subjective:    Lynnette is a post-menopausal 82 year old female with a PMH of osteoarthritis, hypertension, and vaginal atrophy. She presents to clinic today at the suggestion of her PCP to have a pelvic and breast exam. She states that she is still having yearly mammograms. She has no history of abnormal pap smears or cervical procedures. She is having no symptoms including bleeding from the vagina, pain in her pelvis, change in discharge, or urinary symptoms. She has no complaints of vaginal atrophy or dryness and does not feel like this issue needs to be managed here. She does not do her own breast exams, but denies symptoms of skin changes, dimpling, abnormal mammograms in the past. She has no family or personal history of breast cancer. States that she had a dexa scan evaluation 5 years ago and was told to follow up in 10 years. She plans to do this.    Objective:  /73  Pulse 73  Ht 1.67 m (5' 5.75\")  Wt 63.5 kg (140 lb)  BMI 22.77 kg/m2  General: well developed, well nourished elderly female. Alert, cooperative, interactive. No distress.    Assessment:  Encounter Diagnosis   Name Primary?     Well woman exam Yes       Plan:  Discussed with Lynnette that there are no indications to do a bimanual or pelvic exam if she is not having symptoms of bleeding from her vagina, change in discharge, burning or itching, or other complaints. Reviewed indications for papsmears by the USPTF and with no history of abnormal papsmears and age over >65, they are no longer indicated. Discussed that the evidence for continuing to do mammograms and breast exams after the age of 74 is also not strong enough to recommend continuing " them. We reviewed concerning signs and symptoms including skin changes and dimpling. She desires to continue to have mammograms and will continue to schedule them yearly.     Lynnette declines pelvic and breast exam at this point after receiving the most current recommendations of USPTF.  She agrees to follow up with concerning signs and symptoms of bleeding from her vagina, change in discharge, pelvic pain, skin dimpling, skin changes, or breast tenderness.    Patient was given adequate time to ask questions and received appropriate answer. She agrees with the plan as written.    The patient left before she saw the CNM.    I, Huong Mejia, am serving as a scribe; to document services personally performed by  LUIS Burch CNM based on data collection and the provider's statements to me.     Huong Mejia, PA-S2        Again, thank you for allowing me to participate in the care of your patient.      Sincerely,    LUIS Gunter CNM

## 2018-08-02 NOTE — MR AVS SNAPSHOT
After Visit Summary   8/2/2018    Lynnette Mata    MRN: [id number]           Patient Information     Date Of Birth          1935        Visit Information        Provider Department      8/2/2018 3:45 PM Facundo Kwong APRN CNM [facility]        Today's Diagnoses     Well woman exam    -  1       Follow-ups after your visit        Your next 10 appointments already scheduled     Aug 23, 2018  1:45 PM CDT   Neuro Treatment with Caterina Healy PT   [facility], [place], Physical Therapy - Outpatient ([facility])    [address], [address]  [place] [address]   [phone]            Aug 28, 2018  6:45 PM CDT   (Arrive by 6:30 PM)   Return Visit with LUIS Herrera CNP   [facility] ([facility])    [address] [place] [address]  [address] [facility] [id number] [phone]            Oct 22, 2018 12:00 PM CDT   (Arrive by 11:45 AM)   PHYSICAL with Nickolas Davenport MD   [facility] ([facility])    [address] [place] [address]  [address] [facility] [id number] [phone]            Jan 31, 2019 11:00 AM CST   (Arrive by 10:45 AM)   Return Visit with Ariadne Tom MD   [facility] Physical Medicine and Rehabilitation ([facility])    [address] [place] [address]  [address] [facility] [id number] [phone]              Who to contact     Please call your clinic at [phone] to:    Ask questions about your health    Make or cancel appointments    Discuss your medicines    Learn about your test results    Speak to your doctor            Additional Information About Your Visit        MyChart Information     OnKurehart gives you secure access to your electronic health record. If you see a primary care provider, you can also send messages to your care team and make appointments. If you have questions, please call  "your primary care clinic.  If you do not have a primary care provider, please call [phone] and they will assist you.      FamilyLeaf is an electronic gateway that provides easy, online access to your medical records. With FamilyLeaf, you can request a clinic appointment, read your test results, renew a prescription or communicate with your care team.     To access your existing account, please contact your [facility] or call [phone] for assistance.        Care EveryWhere ID     This is your Care EveryWhere ID. This could be used by other organizations to access your [place] medical records  [id number]        Your Vitals Were     Pulse Height BMI (Body Mass Index)             73 1.67 m (5' 5.75\") 22.77 kg/m2          Blood Pressure from Last 3 Encounters:   No data found for BP    Weight from Last 3 Encounters:   No data found for Wt              Today, you had the following     No orders found for display       Primary Care Provider Office Phone # Fax #    Nickolas Jose Francisco Davenport [phone]       [address] [address]        Equal Access to Services     [facility] Center[facility]ZARA : Hadii aad ku hadasho Soomaali, waaxda luqadaha, qaybta kaalmada adeegyajaime, katia alonzo . So [facility] [phone].    ATENCIÓN: Si habla español, tiene a calabrese disposición servicios gratuitos de asistencia lingüística. Andria al [phone].    We comply with applicable federal civil rights laws and [place] laws. We do not discriminate on the basis of race, color, national origin, age, disability, sex, sexual orientation, or gender identity.            Thank you!     Thank you for choosing [facility]  for your care. Our goal is always to provide you with excellent care. Hearing back from our patients is one way we can continue to improve our services. Please take a few minutes to complete the written survey that you may receive in " the mail after your visit with us. Thank you!             Your Updated Medication List - Protect others around you: Learn how to safely use, store and throw away your medicines at www.disposemymeds.org.          This list is accurate as of 8/2/18 11:59 PM.  Always use your most recent med list.                   Brand Name Dispense Instructions for use Diagnosis    aspirin 81 MG chewable tablet      Take 81 mg by mouth daily.        CALCIUM 600 PO      Take  by mouth daily.        fish oil-omega-3 fatty acids 1000 MG capsule      Take 2 g by mouth daily.        glucosamine 500 MG Caps      Take  by mouth daily.        lisinopril 10 MG tablet    PRINIVIL/ZESTRIL    90 tablet    Take 1 tablet (10 mg) by mouth daily    Essential hypertension, benign, Balance problems, Personal history of fall, Generalized muscle weakness       Multi-vitamin Tabs tablet   Generic drug:  multivitamin, therapeutic with minerals      Take 1 tablet by mouth daily.        simvastatin 20 MG tablet    ZOCOR    90 tablet    Take 1 tablet (20 mg) by mouth daily    Familial hypercholesterolemia, Atypical chest pain

## 2018-08-02 NOTE — PROGRESS NOTES
"Outpatient Physical Therapy Progress Note     Patient: Lynnette Mata  : 1935    Beginning/End Dates of Reporting Period:  2018 to 2018    Referring Provider: Ariadne Tom MD    Therapy Diagnosis: Impaired functional mobility, balance, gait, endurance.     Client Self Report: \"I have an appointment tomorrow with the doctor for my depression.\"  Pt reports feeling \"overall very tired and weak inside\" and indicates that she has anxiety related to life events but nothing related to PT.     Objective Measurements:  Objective Measure: NuStep  Details: Seat 10, arms 10, WL 5 x5', 291 steps.    Objective Measure: TUG  Details: 12 seconds, no AD. (initial eval: 17.51 sec)    Objective Measure: 25-foot walk  Details: 9.44 sec/15 steps. (initial eval: 12.71 sec/18 steps)    Objective Measure: FGA  Details:  (initial eval: )  Functional Gait Assessment (FGA): The FGA assesses postural stability during various walking tasks.   Gait assistive device used: None  Patient Score: 16/30  Scores of <22/30 have been correlated with predicting falls in community-dwelling older adults according to Josep & Lujan 2010.   Scores of <18/30 have been correlated with increased risk for falls in patients with Parkinsons Disease according to Maximo Rosas Zhou et al 2014.  Minimal Detectable Change for patients with acute/chronic stroke = 4.2 according to Thieme & Ritschel 2009  Minimal Detectable Change for patients with vestibular disorder = 8 according to Wrisley & Lujan 2010    Assessment (rationale for performing, application to patient s function & care plan): Reassessment to monitor pt's progress with dynamic gait skills and fall risk. Improvement has been made beyond MDC with 5 point improvement since initial assessment. Pt would benefit from continued skilled PT to continue improving her dynamic gait balance and minimize her fall risk.   Minutes billed as physical performance test: 15      Goals:  Goal " Identifier TUG   Goal Description Pt will complete the TUG in less than 13.5 seconds in order to reduce her falls risk.   Target Date 08/24/18   Date Met  08/02/18   Progress:MET     Goal Identifier Gait speed   Goal Description Pt will complete the 25-foot walk test in less than 8 seconds to improve her efficiency and safety of walking at home and in the community.   Target Date 08/24/18   Date Met      Progress: Improved from 12.71 seconds to 9.44 seconds     Goal Identifier Dynamic balance   Goal Description Pt will demonstrate improved dynamic balance as measured by an FGA score of at least 20/30, in order to improve her safety and reduce falls risk.   Target Date 08/24/18   Date Met      Progress: Improved from 11/30 to 16/30     Goal Identifier HEP   Goal Description Pt will demonstrate independent performance of her home exercise program to maximize her ability to manage her condition and promote optimal wellness.   Target Date 08/24/18   Date Met  08/02/18   Progress: MET     Progress Toward Goals:   Progress this reporting period: progress made on all goals, 2/4 goals MET.    Plan:  Changes to therapy plan of care: continue skilled PT at a frequency of 1-2x/month. Pt to begin working on adhering to HEP using a calendar or activity tracker to encourage motivation and self-management of her program.     Discharge:  No

## 2018-08-02 NOTE — PROGRESS NOTES
"[facility], formerly [facility]   Lashon TURNERVasile Gonzales, LUIS CNP  08/03/2018      Chief Complaint:   Anxiety and Insomnia       History of Present Illness:   Lynnette Mata is a 82 year old female, postmenopause, with a history of hypertension who presents for evaluation of anxiety and insomnia. She states that in the distant past she had a history of dysthymia, and also has concerns that she has a \"touch\" of the mental illness that her father and brother had.     Anxiety/Insomnia:  She reports that her symptoms have been going on for about 1.5 months. She does not know what would have triggered these feelings; she has had times before where she has \"felt down\" but has been able to \"tough it out\" before; she has never done therapy and has not been on medication for this before either. Now, she is struggling to \"tough it out.\" She feels like \"things are hopeless\" and that she has too many decisions to make. Her  passed away a year ago, and she states that she was able to handle that without any real problem at that time--she soon after had her kitchen remodeled and was able to clean out her home. This activity and decision making \"came to a halt\" this spring, especially as she is not able to do all of this alone. Her plan was to sell her home, and preparation for this has come to a halt, as well as any other decision making. Right now, she has help from a handy-woman who helps her around her home, as well as friends who help her with yard work. All of this, however, is feeling more overwhelming than usual and she has anxiety \"like she has never had before.\" Particularly, later in the day, she begins to feel on edge with growing anxiety. The patient states that she has been \"going through the motions of living.\" She has been waking more at night and worrying about things--she has no trouble falling asleep, but she struggles to stay asleep. Lynnette falls asleep around 2200, but wakes up often because she has to " use the bathroom. She also endorses notable weight loss--over ten pounds within the last few months without trying--and so she has begun to make herself eat more, although she does not enjoy this. The patient denies any plan to harm herself, abdominal pain, severe fatigue, shortness of breath, skin changes, blood loss, and heart palpitations.      Notably, she does not want to talk about any of this with her friends or family--she thinks that this would only reinforce her symptoms, rather than help them. She does not want them to worry about her, in particular. However, she states that she would be able to stay active with some friends without talking about this with them.     Review of Systems:   Pertinent items are noted in HPI, remainder of complete ROS is negative.      Active Medications:      aspirin 81 MG chewable tablet, Take 81 mg by mouth daily., Disp: , Rfl:      Calcium Carbonate (CALCIUM 600 PO), Take  by mouth daily., Disp: , Rfl:      fish oil-omega-3 fatty acids (FISH OIL) 1000 MG capsule, Take 2 g by mouth daily., Disp: , Rfl:      Glucosamine 500 MG CAPS, Take  by mouth daily., Disp: , Rfl:      lisinopril (PRINIVIL/ZESTRIL) 10 MG tablet, Take 1 tablet (10 mg) by mouth daily, Disp: 90 tablet, Rfl: 3     Multiple Vitamin (MULTI-VITAMIN) per tablet, Take 1 tablet by mouth daily., Disp: , Rfl:      simvastatin (ZOCOR) 20 MG tablet, Take 1 tablet (20 mg) by mouth daily, Disp: 90 tablet, Rfl: 3      Allergies:   No Known Drug Allergies      Past Medical History:  Asymptomatic menopausal state  Shingles outbreak, despite immunization  Stricture and stenosis of cervix   Unspecific constipation, chronic   Essential hypertension  Post-menopause  Primary osteoarthritis of both knees  Abnormal cardiovascular stress test  Advanced care planning  Genital atrophy of female      Past Surgical History:  Colonoscopy     Family History:   Myocardial infarction  - Mother   Cerebrovascular disease - Maternal  grandmother   Colorectal cancer - Maternal Aunt  Endocrine disease, type I diabetes mellitus  - Brother  Psychotic disorder, bipolar - Brother  Coronary artery disease  - Brother   Psychotic disorder, suicide - Father   Type II diabetes mellitus - Maternal grandfather       Social History:   The patient was alone.  Smoking Status: Never   Smokeless Tobacco: Never   Alcohol Use: No       Physical Exam:   /67 (BP Location: Right arm, Patient Position: Sitting, Cuff Size: Adult Regular)  Pulse 84  Resp 20  Wt 63 kg (139 lb)  SpO2 97%  BMI 22.61 kg/m2     Constitutional: Alert, oriented, pleasant, no acute distress  Head: Normocephalic, atraumatic  Neck: Supple, no lymphadenopathy, no thyromegaly  Cardiovascular: Regular rate and rhythm, no murmurs, rubs or gallops  Respiratory: Good air movement bilaterally, lungs clear, no wheezes/rales/rhonchi  Psychiatric: normal mentation, affect and mood. Demonstrates intact judgement and logical thought processes     Assessment and Plan:  Anxiety, Moderate single current episode of major depressive disorder (H)  She is hesitant to start medication at this point, as she does not want to wait the 4-8 weeks for medication to be effective. We discussed that she could consider a few sessions with a therapist, as an effective way to help her work through and alleviate her symptoms. She is alright with this, and so a referral was placed for her. She understands that she will have to call to schedule the appointment. She denies SI or thoughts of self harm today.  - MENTAL HEALTH REFERRAL  - Adult; Outpatient Treatment; Individual/Couples/Family/Group Therapy/Health Psychology; [facility] - [facility] [phone]; Patient call to schedule    Psychophysiological insomnia  I offered medication--Remeron or Trazodone--to Lynnette. She could take this at night, which would ideally help with her sleep and her daytime anxiety. She is aware that the Remeron is not fast  acting. Lynnette may taper off of the Remeron in the future, if she feels she no longer needs it and would like to come off of it, she understands that this would have to occur slowly so that her symptoms do not reoccur quickly. She expressed that she does not like the idea of taking a medication that long, and would rather have a medication to take as needed, like Trazodone. Therefore, we agreed on Trazodone and not Remeron for her for now.  - traZODone (DESYREL) 50 MG tablet  Dispense: 30 tablet; Refill: 1    Weight loss  I would like to check Lynnette's thyroid, given her physical symptoms such as weight loss in addition to her anxiety. We will also check her CBC and CMP.   - TSH; Future  - T4 free; Future  - T3 Free; Future  -     CBC with platelets differential; Future  -     Comprehensive metabolic panel; Future    Follow-up: in 4 weeks to reassess symptoms and medication tolerance        Scribe Disclosure:   I, Roxie Pak, am serving as a scribe to document services personally performed by LUIS Fortune CNP at this visit, based upon the provider's statements to me. All documentation has been reviewed by the aforementioned provider prior to being entered into the official medical record.     Portions of this medical record were completed by a scribe. UPON MY REVIEW AND AUTHENTICATION BY ELECTRONIC SIGNATURE, this confirms (a) I performed the applicable clinical services, and (b) the record is accurate.     LUIS Fortune CNP

## 2018-08-03 ENCOUNTER — TELEPHONE (OUTPATIENT)
Dept: INTERNAL MEDICINE | Facility: CLINIC | Age: 83
End: 2018-08-03

## 2018-08-03 ENCOUNTER — OFFICE VISIT (OUTPATIENT)
Dept: INTERNAL MEDICINE | Facility: CLINIC | Age: 83
End: 2018-08-03
Payer: COMMERCIAL

## 2018-08-03 VITALS
HEART RATE: 84 BPM | BODY MASS INDEX: 22.61 KG/M2 | OXYGEN SATURATION: 97 % | WEIGHT: 139 LBS | DIASTOLIC BLOOD PRESSURE: 67 MMHG | SYSTOLIC BLOOD PRESSURE: 147 MMHG | RESPIRATION RATE: 20 BRPM

## 2018-08-03 DIAGNOSIS — R63.4 WEIGHT LOSS: ICD-10-CM

## 2018-08-03 DIAGNOSIS — F32.1 MODERATE SINGLE CURRENT EPISODE OF MAJOR DEPRESSIVE DISORDER (H): ICD-10-CM

## 2018-08-03 DIAGNOSIS — F51.04 PSYCHOPHYSIOLOGICAL INSOMNIA: ICD-10-CM

## 2018-08-03 DIAGNOSIS — F41.9 ANXIETY: Primary | ICD-10-CM

## 2018-08-03 LAB
ALBUMIN SERPL-MCNC: 3.5 G/DL (ref 3.4–5)
ALP SERPL-CCNC: 38 U/L (ref 40–150)
ALT SERPL W P-5'-P-CCNC: 32 U/L (ref 0–50)
ANION GAP SERPL CALCULATED.3IONS-SCNC: 4 MMOL/L (ref 3–14)
AST SERPL W P-5'-P-CCNC: 21 U/L (ref 0–45)
BASOPHILS # BLD AUTO: 0 10E9/L (ref 0–0.2)
BASOPHILS NFR BLD AUTO: 0.4 %
BILIRUB SERPL-MCNC: 0.3 MG/DL (ref 0.2–1.3)
BUN SERPL-MCNC: 23 MG/DL (ref 7–30)
CALCIUM SERPL-MCNC: 9.3 MG/DL (ref 8.5–10.1)
CHLORIDE SERPL-SCNC: 104 MMOL/L (ref 94–109)
CO2 SERPL-SCNC: 31 MMOL/L (ref 20–32)
CREAT SERPL-MCNC: 0.71 MG/DL (ref 0.52–1.04)
DIFFERENTIAL METHOD BLD: NORMAL
EOSINOPHIL # BLD AUTO: 0.1 10E9/L (ref 0–0.7)
EOSINOPHIL NFR BLD AUTO: 1 %
ERYTHROCYTE [DISTWIDTH] IN BLOOD BY AUTOMATED COUNT: 12.4 % (ref 10–15)
GFR SERPL CREATININE-BSD FRML MDRD: 78 ML/MIN/1.7M2
GLUCOSE SERPL-MCNC: 120 MG/DL (ref 70–99)
HCT VFR BLD AUTO: 42.2 % (ref 35–47)
HGB BLD-MCNC: 14.4 G/DL (ref 11.7–15.7)
IMM GRANULOCYTES # BLD: 0 10E9/L (ref 0–0.4)
IMM GRANULOCYTES NFR BLD: 0.4 %
LYMPHOCYTES # BLD AUTO: 1.1 10E9/L (ref 0.8–5.3)
LYMPHOCYTES NFR BLD AUTO: 15.2 %
MCH RBC QN AUTO: 32.7 PG (ref 26.5–33)
MCHC RBC AUTO-ENTMCNC: 34.1 G/DL (ref 31.5–36.5)
MCV RBC AUTO: 96 FL (ref 78–100)
MONOCYTES # BLD AUTO: 0.6 10E9/L (ref 0–1.3)
MONOCYTES NFR BLD AUTO: 8.7 %
NEUTROPHILS # BLD AUTO: 5.2 10E9/L (ref 1.6–8.3)
NEUTROPHILS NFR BLD AUTO: 74.3 %
NRBC # BLD AUTO: 0 10*3/UL
NRBC BLD AUTO-RTO: 0 /100
PLATELET # BLD AUTO: 264 10E9/L (ref 150–450)
POTASSIUM SERPL-SCNC: 4.5 MMOL/L (ref 3.4–5.3)
PROT SERPL-MCNC: 7.2 G/DL (ref 6.8–8.8)
RBC # BLD AUTO: 4.41 10E12/L (ref 3.8–5.2)
SODIUM SERPL-SCNC: 139 MMOL/L (ref 133–144)
T3FREE SERPL-MCNC: 2.6 PG/ML (ref 2.3–4.2)
T4 FREE SERPL-MCNC: 1.12 NG/DL (ref 0.76–1.46)
TSH SERPL DL<=0.005 MIU/L-ACNC: 2.2 MU/L (ref 0.4–4)
WBC # BLD AUTO: 7.1 10E9/L (ref 4–11)

## 2018-08-03 RX ORDER — TRAZODONE HYDROCHLORIDE 50 MG/1
50 TABLET, FILM COATED ORAL
Qty: 30 TABLET | Refills: 1 | Status: SHIPPED | OUTPATIENT
Start: 2018-08-03 | End: 2018-08-28

## 2018-08-03 ASSESSMENT — PAIN SCALES - GENERAL: PAINLEVEL: NO PAIN (0)

## 2018-08-03 ASSESSMENT — ANXIETY QUESTIONNAIRES
GAD7 TOTAL SCORE: 11
7. FEELING AFRAID AS IF SOMETHING AWFUL MIGHT HAPPEN: NOT AT ALL
6. BECOMING EASILY ANNOYED OR IRRITABLE: NOT AT ALL
2. NOT BEING ABLE TO STOP OR CONTROL WORRYING: NEARLY EVERY DAY
5. BEING SO RESTLESS THAT IT IS HARD TO SIT STILL: NOT AT ALL
IF YOU CHECKED OFF ANY PROBLEMS ON THIS QUESTIONNAIRE, HOW DIFFICULT HAVE THESE PROBLEMS MADE IT FOR YOU TO DO YOUR WORK, TAKE CARE OF THINGS AT HOME, OR GET ALONG WITH OTHER PEOPLE: SOMEWHAT DIFFICULT
3. WORRYING TOO MUCH ABOUT DIFFERENT THINGS: NEARLY EVERY DAY
1. FEELING NERVOUS, ANXIOUS, OR ON EDGE: MORE THAN HALF THE DAYS

## 2018-08-03 ASSESSMENT — PATIENT HEALTH QUESTIONNAIRE - PHQ9: 5. POOR APPETITE OR OVEREATING: NEARLY EVERY DAY

## 2018-08-03 NOTE — PATIENT INSTRUCTIONS
"[facility] Medication Refill Request Information:  * Please contact your pharmacy regarding ANY request for medication refills.  ** [facility] Prescription Fax = [phone]  * Please allow 3 business days for routine medication refills.  * Please allow 5 business days for controlled substance medication refills.     [facility] Test Result notification information:  *You will be notified with in 7-10 days of your appointment day regarding the results of your test.  If you are on MyChart you will be notified as soon as the provider has reviewed the results and signed off on them.    [facility]: [phone]       Stress Relief: Relaxation    Focusing the mind helps provide stress relief. Taking 5 to 10 minutes to practice relaxation each day helps you feel more refreshed. The following exercises can be done almost anywhere. Try one or more until you find what works best for you.  Calm your mind  Find a quiet place where you won't be disturbed. Then try the following:    Sit comfortably. Take off your shoes. Turn off your cell phone and pager. Take a few deep breaths.    Focus your mind on one peaceful thought, image, or word. Then try to hold that thought for 5 minutes.    When other thoughts enter your mind, relax and refocus. Let the invading thoughts fall away.    When you're done, stand up slowly and stretch your arms over your head. With practice, this exercise can help you feel restored.  Calm your body  With practice, you can use mental cues to tell your body how to feel.    Sit comfortably and clear your mind. A few deep breaths will help.    Mentally focus on your left hand and repeat to yourself, \"My left hand feels warm and heavy.\" Keep doing this until your hand does feel heavier and warmer.    Repeat the exercise using your right hand. Then focus on your arms, legs, and feet until your whole body feels relaxed.    When you're done, stand up slowly and stretch your arms " overhead.  Visualization  Visualization is like taking a mental vacation. It frees your mind while keeping your body in a calm state. To get started, picture yourself feeling warm and relaxed. Choose a peaceful setting that appeals to you and fill in the details. If you imagine a tropical beach, listen to the waves on the shore. Feel the sun on your face. Dig your toes in the sand. By using the power of your mind, you can take a soothing break when you need to.  Date Last Reviewed: 1/1/2017 2000-2017 [employer]. [address] [place] [id number]. All rights reserved. This information is not intended as a substitute for professional medical care. Always follow your healthcare professional's instructions.    General resources in case you are feeling increasingly depressed and/or suicidal:    Call 911 or go directly to an Emergency Room (such as [facility] or [facility]) if you need help immediately        Here are some hotline options:    [place] Residents:  CRISIS CONNECTION [phone]  Or [phone]      [facility], [facility], [phone]      [facility]IS PROGRAM, [facility],   [phone]      [facility]  [phone] ([phone]-TALK)      [facility] [facility] [facility]WORK  [phone] (8-965-DDALZFB)          Trazodone tablets  Brand Name: Desyrel  What is this medicine?  TRAZODONE (TRAZ oh done) is used to treat depression.  How should I use this medicine?  Take this medicine by mouth with a glass of water. Follow the directions on the prescription label. Take this medicine shortly after a meal or a light snack. Take your medicine at regular intervals. Do not take your medicine more often than directed. Do not stop taking this medicine suddenly except upon the advice of your doctor. Stopping this medicine too quickly may cause serious side effects or  your condition may worsen.  A special MedGuide will be given to you by the pharmacist with each prescription and refill. Be sure to read this information carefully each time.  Talk to your pediatrician regarding the use of this medicine in children. Special care may be needed.  What side effects may I notice from receiving this medicine?  Side effects that you should report to your doctor or health care professional as soon as possible:    allergic reactions like skin rash, itching or hives, swelling of the face, lips, or tongue    elevated mood, decreased need for sleep, racing thoughts, impulsive behavior    confusion    fast, irregular heartbeat    feeling faint or lightheaded, falls    feeling agitated, angry, or irritable    loss of balance or coordination    painful or prolonged erections    restlessness, pacing, inability to keep still    suicidal thoughts or other mood changes    tremors    trouble sleeping    seizures    unusual bleeding or bruising  Side effects that usually do not require medical attention (report to your doctor or health care professional if they continue or are bothersome):    change in sex drive or performance    change in appetite or weight    constipation    headache    muscle aches or pains    nausea  What may interact with this medicine?  Do not take this medicine with any of the following medications:    certain medicines for fungal infections like fluconazole, itraconazole, ketoconazole, posaconazole, voriconazole    cisapride    dofetilide    dronedarone    linezolid    MAOIs like Carbex, Eldepryl, Marplan, Nardil, and Parnate    mesoridazine    methylene blue (injected into a vein)    pimozide    saquinavir    thioridazine    ziprasidone  This medicine may also interact with the following medications:    alcohol    antiviral medicines for HIV or AIDS    aspirin and aspirin-like medicines    barbiturates like phenobarbital    certain medicines for blood pressure, heart disease,  irregular heart beat    certain medicines for depression, anxiety, or psychotic disturbances    certain medicines for migraine headache like almotriptan, eletriptan, frovatriptan, naratriptan, rizatriptan, sumatriptan, zolmitriptan    certain medicines for seizures like carbamazepine and phenytoin    certain medicines for sleep    certain medicines that treat or prevent blood clots like dalteparin, enoxaparin, warfarin    digoxin    fentanyl    lithium    NSAIDS, medicines for pain and inflammation, like ibuprofen or naproxen    other medicines that prolong the QT interval (cause an abnormal heart rhythm)    rasagiline    supplements like Trista's wort, kava kava, valerian    tramadol    tryptophan  What if I miss a dose?  If you miss a dose, take it as soon as you can. If it is almost time for your next dose, take only that dose. Do not take double or extra doses.  Where should I keep my medicine?  Keep out of the reach of children.  Store at room temperature between 15 and 30 degrees C (59 to 86 degrees F). Protect from light. Keep container tightly closed. Throw away any unused medicine after the expiration date.  What should I tell my health care provider before I take this medicine?  They need to know if you have any of these conditions:    attempted suicide or thinking about it    bipolar disorder    bleeding problems    glaucoma    heart disease, or previous heart attack    irregular heart beat    kidney or liver disease    low levels of sodium in the blood    an unusual or allergic reaction to trazodone, other medicines, foods, dyes or preservatives    pregnant or trying to get pregnant    breast-feeding  What should I watch for while using this medicine?  Tell your doctor if your symptoms do not get better or if they get worse. Visit your doctor or health care professional for regular checks on your progress. Because it may take several weeks to see the full effects of this medicine, it is important to  continue your treatment as prescribed by your doctor.  Patients and their families should watch out for new or worsening thoughts of suicide or depression. Also watch out for sudden changes in feelings such as feeling anxious, agitated, panicky, irritable, hostile, aggressive, impulsive, severely restless, overly excited and hyperactive, or not being able to sleep. If this happens, especially at the beginning of treatment or after a change in dose, call your health care professional.  You may get drowsy or dizzy. Do not drive, use machinery, or do anything that needs mental alertness until you know how this medicine affects you. Do not stand or sit up quickly, especially if you are an older patient. This reduces the risk of dizzy or fainting spells. Alcohol may interfere with the effect of this medicine. Avoid alcoholic drinks.  This medicine may cause dry eyes and blurred vision. If you wear contact lenses you may feel some discomfort. Lubricating drops may help. See your eye doctor if the problem does not go away or is severe.  Your mouth may get dry. Chewing sugarless gum, sucking hard candy and drinking plenty of water may help. Contact your doctor if the problem does not go away or is severe.  NOTE:This sheet is a summary. It may not cover all possible information. If you have questions about this medicine, talk to your doctor, pharmacist, or health care provider. Copyright  2018 [email or web address]

## 2018-08-03 NOTE — MR AVS SNAPSHOT
After Visit Summary   8/3/2018    Lynnette Mata    MRN: [id number]           Patient Information     Date Of Birth          1935        Visit Information        Provider Department      8/3/2018 9:00 AM Lashon Gonzales APRN [facility]        Today's Diagnoses     Anxiety    -  1    Psychophysiological insomnia        Moderate single current episode of major depressive disorder (H)        Weight loss          Care Instructions    Primary Care Center Medication Refill Request Information:  * Please contact your pharmacy regarding ANY request for medication refills.  ** [facility] Prescription Fax = [phone]  * Please allow 3 business days for routine medication refills.  * Please allow 5 business days for controlled substance medication refills.     Primary Care Center Test Result notification information:  *You will be notified with in 7-10 days of your appointment day regarding the results of your test.  If you are on MyChart you will be notified as soon as the provider has reviewed the results and signed off on them.    [facility] ([place]): [phone]       Stress Relief: Relaxation    Focusing the mind helps provide stress relief. Taking 5 to 10 minutes to practice relaxation each day helps you feel more refreshed. The following exercises can be done almost anywhere. Try one or more until you find what works best for you.  Calm your mind  Find a quiet place where you won't be disturbed. Then try the following:    Sit comfortably. Take off your shoes. Turn off your cell phone and pager. Take a few deep breaths.    Focus your mind on one peaceful thought, image, or word. Then try to hold that thought for 5 minutes.    When other thoughts enter your mind, relax and refocus. Let the invading thoughts fall away.    When you're done, stand up slowly and stretch your arms over your head. With practice, this exercise can help you feel restored.  Calm your body  With  "practice, you can use mental cues to tell your body how to feel.    Sit comfortably and clear your mind. A few deep breaths will help.    Mentally focus on your left hand and repeat to yourself, \"My left hand feels warm and heavy.\" Keep doing this until your hand does feel heavier and warmer.    Repeat the exercise using your right hand. Then focus on your arms, legs, and feet until your whole body feels relaxed.    When you're done, stand up slowly and stretch your arms overhead.  Visualization  Visualization is like taking a mental vacation. It frees your mind while keeping your body in a calm state. To get started, picture yourself feeling warm and relaxed. Choose a peaceful setting that appeals to you and fill in the details. If you imagine a tropical beach, listen to the waves on the shore. Feel the sun on your face. Dig your toes in the sand. By using the power of your mind, you can take a soothing break when you need to.  Date Last Reviewed: 1/1/2017 2000-2017 [employer]. [address] [place] [address]. All rights reserved. This information is not intended as a substitute for professional medical care. Always follow your healthcare professional's instructions.    General resources in case you are feeling increasingly depressed and/or suicidal:    Call 911 or go directly to an Emergency Room (such as [facility] or ) if you need help immediately        Here are some hotline options:    [place] Residents:  CRISIS CONNECTION [phone]  Or [phone]      CRISIS INTERVENTION [facility]ENTER, [facility], [phone]      CRISIS PROGRAM, [facility],   [phone]      NATIONAL SUICIDE PREVENTION [facility]IF[facility]INE  [phone] (4-987-231-TALK)      NATIONAL HOPELINE NETWORK  [phone] (8-158-QGHKWHC)          Trazodone tablets  Brand Name: Desyrel  What is this medicine?  TRAZODONE " (BRYN oh done) is used to treat depression.  How should I use this medicine?  Take this medicine by mouth with a glass of water. Follow the directions on the prescription label. Take this medicine shortly after a meal or a light snack. Take your medicine at regular intervals. Do not take your medicine more often than directed. Do not stop taking this medicine suddenly except upon the advice of your doctor. Stopping this medicine too quickly may cause serious side effects or your condition may worsen.  A special MedGuide will be given to you by the pharmacist with each prescription and refill. Be sure to read this information carefully each time.  Talk to your pediatrician regarding the use of this medicine in children. Special care may be needed.  What side effects may I notice from receiving this medicine?  Side effects that you should report to your doctor or health care professional as soon as possible:    allergic reactions like skin rash, itching or hives, swelling of the face, lips, or tongue    elevated mood, decreased need for sleep, racing thoughts, impulsive behavior    confusion    fast, irregular heartbeat    feeling faint or lightheaded, falls    feeling agitated, angry, or irritable    loss of balance or coordination    painful or prolonged erections    restlessness, pacing, inability to keep still    suicidal thoughts or other mood changes    tremors    trouble sleeping    seizures    unusual bleeding or bruising  Side effects that usually do not require medical attention (report to your doctor or health care professional if they continue or are bothersome):    change in sex drive or performance    change in appetite or weight    constipation    headache    muscle aches or pains    nausea  What may interact with this medicine?  Do not take this medicine with any of the following medications:    certain medicines for fungal infections like fluconazole, itraconazole, ketoconazole, posaconazole,  voriconazole    cisapride    dofetilide    dronedarone    linezolid    MAOIs like Carbex, Eldepryl, Marplan, Nardil, and Parnate    mesoridazine    methylene blue (injected into a vein)    pimozide    saquinavir    thioridazine    ziprasidone  This medicine may also interact with the following medications:    alcohol    antiviral medicines for HIV or AIDS    aspirin and aspirin-like medicines    barbiturates like phenobarbital    certain medicines for blood pressure, heart disease, irregular heart beat    certain medicines for depression, anxiety, or psychotic disturbances    certain medicines for migraine headache like almotriptan, eletriptan, frovatriptan, naratriptan, rizatriptan, sumatriptan, zolmitriptan    certain medicines for seizures like carbamazepine and phenytoin    certain medicines for sleep    certain medicines that treat or prevent blood clots like dalteparin, enoxaparin, warfarin    digoxin    fentanyl    lithium    NSAIDS, medicines for pain and inflammation, like ibuprofen or naproxen    other medicines that prolong the QT interval (cause an abnormal heart rhythm)    rasagiline    supplements like [place]'s wort, kava kava, valerian    tramadol    tryptophan  What if I miss a dose?  If you miss a dose, take it as soon as you can. If it is almost time for your next dose, take only that dose. Do not take double or extra doses.  Where should I keep my medicine?  Keep out of the reach of children.  Store at room temperature between 15 and 30 degrees C (59 to 86 degrees F). Protect from light. Keep container tightly closed. Throw away any unused medicine after the expiration date.  What should I tell my health care provider before I take this medicine?  They need to know if you have any of these conditions:    attempted suicide or thinking about it    bipolar disorder    bleeding problems    glaucoma    heart disease, or previous heart attack    irregular heart beat    kidney or liver disease    low  levels of sodium in the blood    an unusual or allergic reaction to trazodone, other medicines, foods, dyes or preservatives    pregnant or trying to get pregnant    breast-feeding  What should I watch for while using this medicine?  Tell your doctor if your symptoms do not get better or if they get worse. Visit your doctor or health care professional for regular checks on your progress. Because it may take several weeks to see the full effects of this medicine, it is important to continue your treatment as prescribed by your doctor.  Patients and their families should watch out for new or worsening thoughts of suicide or depression. Also watch out for sudden changes in feelings such as feeling anxious, agitated, panicky, irritable, hostile, aggressive, impulsive, severely restless, overly excited and hyperactive, or not being able to sleep. If this happens, especially at the beginning of treatment or after a change in dose, call your health care professional.  You may get drowsy or dizzy. Do not drive, use machinery, or do anything that needs mental alertness until you know how this medicine affects you. Do not stand or sit up quickly, especially if you are an older patient. This reduces the risk of dizzy or fainting spells. Alcohol may interfere with the effect of this medicine. Avoid alcoholic drinks.  This medicine may cause dry eyes and blurred vision. If you wear contact lenses you may feel some discomfort. Lubricating drops may help. See your eye doctor if the problem does not go away or is severe.  Your mouth may get dry. Chewing sugarless gum, sucking hard candy and drinking plenty of water may help. Contact your doctor if the problem does not go away or is severe.  NOTE:This sheet is a summary. It may not cover all possible information. If you have questions about this medicine, talk to your doctor, pharmacist, or health care provider. Copyright  2018 [employer]                Follow-ups after your visit         Additional Services     MENTAL HEALTH REFERRAL  - Adult; Outpatient Treatment; Individual/Couples/Family/Group Therapy/Health Psychology; [facility]: Health Psychology - Cass Mcpherson [phone]; Patient call to schedule       All scheduling is subject to the client's specific insurance plan & benefits, provider/location availability, and provider clinical specialities.  Please arrive 15 minutes early for your first appointment and bring your completed paperwork.    Please be aware that coverage of these services is subject to the terms and limitations of your health insurance plan.  Call member services at your health plan with any benefit or coverage questions.                            Follow-up notes from your care team     Return in about 4 weeks (around 8/31/2018).      Your next 10 appointments already scheduled     Aug 23, 2018  1:45 PM CDT   Neuro Treatment with Caterina Healy PT   [facility] [place], Physical Therapy - Outpatient ([facility])    [address], [address]  [place] [address]   [phone]            Oct 22, 2018 12:00 PM CDT   (Arrive by 11:45 AM)   PHYSICAL with Nickolas Davenport MD   [facility] ([facility])    [address] [address] [phone]              Future tests that were ordered for you today     Open Future Orders        Priority Expected Expires Ordered    CBC with platelets differential Routine 8/3/2018 8/17/2018 8/3/2018    Comprehensive metabolic panel Routine 8/3/2018 8/17/2018 8/3/2018    TSH Routine 8/3/2018 8/3/2019 8/3/2018    T4 free Routine 8/3/2018 8/3/2019 8/3/2018            Who to contact     Please call your clinic at [phone] to:    Ask questions about your health    Make or cancel appointments    Discuss your medicines    Learn about your test results    Speak to your doctor            Additional Information About Your  Visit        [employer] Information     [employer] gives you secure access to your electronic health record. If you see a primary care provider, you can also send messages to your care team and make appointments. If you have questions, please call your primary care clinic.  If you do not have a primary care provider, please call [phone] and they will assist you.      [employer] is an electronic gateway that provides easy, online access to your medical records. With [employer], you can request a clinic appointment, read your test results, renew a prescription or communicate with your care team.     To access your existing account, please contact your [facility] or call [phone] for assistance.        Care EveryWhere ID     This is your Care EveryWhere ID. This could be used by other organizations to access your [facility] medical records  [id number]        Your Vitals Were     Pulse Respirations Pulse Oximetry BMI (Body Mass Index)          84 20 97% 22.61 kg/m2         Blood Pressure from Last 3 Encounters:   08/03/18 147/67   08/02/18 137/73   07/10/18 127/66    Weight from Last 3 Encounters:   08/03/18 63 kg (139 lb)   08/02/18 63.5 kg (140 lb)   07/10/18 62.1 kg (137 lb)              We Performed the Following     MENTAL HEALTH REFERRAL  - Adult; Outpatient Treatment; Individual/Couples/Family/Group Therapy/Health Psychology; [facility]: Health Psychology - Cass Mcpherson [phone]; Patient call to schedule     T3 Free          Today's Medication Changes          These changes are accurate as of 8/3/18  9:30 AM.  If you have any questions, ask your nurse or doctor.               Start taking these medicines.        Dose/Directions    traZODone 50 MG tablet   Commonly known as:  DESYREL   Used for:  Psychophysiological insomnia   Started by:  Lashon Gonzales APRN CNP        Dose:  50 mg   Take 1 tablet (50 mg) by mouth nightly as needed for sleep   Quantity:  30 tablet   Refills:  1             Where to get your medicines      These medications were sent to [facility] - [address] [place] [address] [facility] [address]     Phone:  [phone]     traZODone 50 MG tablet                Primary Care Provider Office Phone # Fax #    Nickolas Jose Francisco Davenport [phone]       [address]  [facility] [address]        Equal Access to Services     WENDIE BRAUN : Hadii aad ku hadasho Soomaali, waaxda luqadaha, qaybta kaalmada adeegyada, waxay idiin hayaan adeeg kharadarnell ladestiny keys. So [facility] [phone].    ATENCIÓN: Si habla español, tiene a calabrese disposición servicios gratuitos de asistencia lingüística. [facility] [phone].    We comply with applicable federal civil rights laws and [place] laws. We do not discriminate on the basis of race, color, national origin, age, disability, sex, sexual orientation, or gender identity.            Thank you!     Thank you for choosing [facility]  for your care. Our goal is always to provide you with excellent care. Hearing back from our patients is one way we can continue to improve our services. Please take a few minutes to complete the written survey that you may receive in the mail after your visit with us. Thank you!             Your Updated Medication List - Protect others around you: Learn how to safely use, store and throw away your medicines at www.disposemymeds.org.          This list is accurate as of 8/3/18  9:30 AM.  Always use your most recent med list.                   Brand Name Dispense Instructions for use Diagnosis    aspirin 81 MG chewable tablet      Take 81 mg by mouth daily.        CALCIUM 600 PO      Take  by mouth daily.        fish oil-omega-3 fatty acids 1000 MG capsule      Take 2 g by mouth daily.        glucosamine 500 MG Caps      Take  by mouth daily.        lisinopril 10 MG tablet    PRINIVIL/ZESTRIL    90 tablet    Take 1 tablet  (10 mg) by mouth daily    Essential hypertension, benign, Balance problems, Personal history of fall, Generalized muscle weakness       Multi-vitamin Tabs tablet   Generic drug:  multivitamin, therapeutic with minerals      Take 1 tablet by mouth daily.        simvastatin 20 MG tablet    ZOCOR    90 tablet    Take 1 tablet (20 mg) by mouth daily    Familial hypercholesterolemia, Atypical chest pain       traZODone 50 MG tablet    DESYREL    30 tablet    Take 1 tablet (50 mg) by mouth nightly as needed for sleep    Psychophysiological insomnia

## 2018-08-03 NOTE — NURSING NOTE
"Chief Complaint   Patient presents with     Anxiety     anxiety/depression for about 1 and a half months     Insomnia     \" Having problems sleeping\"   Kaylin Gtz LPN 8:58 AM on 8/3/2018    Rooming Note  Blood Pressure   BP Readings from Last 1 Encounters:   08/03/18 146/67    Single BP recheck started, 8:59 AM (4 minutes)  Kaylin Gtz LPN 8:59 AM on 8/3/2018  PHQ--9 and RUDI-7 given to patient to fill out..Kaylin Gtz LPN 9:01 AM on 8/3/2018  "

## 2018-08-03 NOTE — TELEPHONE ENCOUNTER
Patient called clinic to speak to Lashon BUTTERFIELD's nurse.  She expressed concern over the side effects of new rx  trazodone and if she does not take this does she still needed her 4 week follow-up appointment ? Would like to speak to RN only. David Stockton LPN at 12:48 PM on 8/3/2018

## 2018-08-04 ASSESSMENT — ANXIETY QUESTIONNAIRES: GAD7 TOTAL SCORE: 11

## 2018-08-04 ASSESSMENT — PATIENT HEALTH QUESTIONNAIRE - PHQ9: SUM OF ALL RESPONSES TO PHQ QUESTIONS 1-9: 12

## 2018-08-06 NOTE — TELEPHONE ENCOUNTER
I spoke to Lynnette today. She stated that she does not want to take a medication daily, she thought it was discussed that she only needed to take medication on an as needed basis. I confirmed with Lynnette that the trazodone is prescribed to take nightly as needed. Stated that she does not need to take every night. Informed her that remeron and trazodone were both discussed in clinic, trazodone was chosen so patient can have the option of not taking every night. She voiced understanding.     Virginie Gaitan RN

## 2018-08-23 ENCOUNTER — HOSPITAL ENCOUNTER (OUTPATIENT)
Dept: PHYSICAL THERAPY | Facility: CLINIC | Age: 83
Setting detail: THERAPIES SERIES
End: 2018-08-23
Attending: PHYSICAL MEDICINE & REHABILITATION
Payer: MEDICARE

## 2018-08-23 PROCEDURE — 97110 THERAPEUTIC EXERCISES: CPT | Mod: GP | Performed by: PHYSICAL THERAPIST

## 2018-08-23 PROCEDURE — G8979 MOBILITY GOAL STATUS: HCPCS | Mod: GP,CI | Performed by: PHYSICAL THERAPIST

## 2018-08-23 PROCEDURE — 40000719 ZZHC STATISTIC PT DEPARTMENT NEURO VISIT: Performed by: PHYSICAL THERAPIST

## 2018-08-23 PROCEDURE — G8978 MOBILITY CURRENT STATUS: HCPCS | Mod: GP,CJ | Performed by: PHYSICAL THERAPIST

## 2018-08-23 NOTE — PROGRESS NOTES
[facility]      OUTPATIENT PHYSICAL THERAPY  PLAN OF TREATMENT FOR OUTPATIENT REHABILITATION    Patient's Last Name, First Name, M.I.                YOB: 1935  Lynnette Mata                        Provider's Name  [facility] Medical Record No.  [id number]                               Onset Date: 5/15/18   Start of Care Date: 5/25/18   Type:     _X_PT   ___OT   ___SLP Medical Diagnosis: Acquired genu valgum of both knees                       PT Diagnosis: Impaired functional mobility, balance, gait, endurance      _________________________________________________________________________________  Plan of Treatment:    Frequency/Duration: Pt has participated actively in 11 PT visits at 1x/week frequency.  Requesting to continue PT visits every 2-4 weeks for another 90 days.      Goals:  Goal Identifier TUG   Goal Description Pt will complete the TUG in less than 13.5 seconds in order to reduce her falls risk.   Target Date 08/24/18   Date Met  08/02/18   Progress: GOAL MET     Goal Identifier Gait speed   Goal Description Pt will complete the 25-foot walk test in less than 8 seconds to improve her efficiency and safety of walking at home and in the community.   Target Date 08/24/18   Date Met      Progress: Improved from 12.7 seconds to 9.4 seconds since start of care     Goal Identifier Dynamic balance   Goal Description Pt will demonstrate improved dynamic balance as measured by an FGA score of at least 20/30, in order to improve her safety and reduce falls risk.   Target Date 08/24/18   Date Met      Progress: Improved from 11/30 to 16/30 since start of care     Goal Identifier HEP   Goal Description Pt will demonstrate independent performance of her home exercise program to maximize her ability to manage her condition and promote optimal wellness.   Target Date  08/24/18   Date Met  08/02/18   Progress: GOAL MET     Progress Toward Goals:   Progress this reporting period: Patient has made progress in all areas, and has met 2 of her 4 goals. She has been experiencing unusually severe depression and anxiety lately which has made it difficult to engage in daily activities. She is following up with her physician regarding this. Despite these challenges, she is adherent to her home exercise program and should continue to make progress with additional physical therapy visits.     Certification date from 8/23/2018 to 11/21/2018.    Caterina Healy, PT          I CERTIFY THE NEED FOR THESE SERVICES FURNISHED UNDER        THIS PLAN OF TREATMENT AND WHILE UNDER MY CARE     (Physician co-signature of this document indicates review and certification of the therapy plan).                Referring Provider: Ariadne Tom

## 2018-08-28 ENCOUNTER — OFFICE VISIT (OUTPATIENT)
Dept: INTERNAL MEDICINE | Facility: CLINIC | Age: 83
End: 2018-08-28
Payer: COMMERCIAL

## 2018-08-28 VITALS
DIASTOLIC BLOOD PRESSURE: 62 MMHG | WEIGHT: 142.1 LBS | HEART RATE: 79 BPM | SYSTOLIC BLOOD PRESSURE: 117 MMHG | OXYGEN SATURATION: 98 % | BODY MASS INDEX: 23.11 KG/M2

## 2018-08-28 DIAGNOSIS — F51.04 PSYCHOPHYSIOLOGICAL INSOMNIA: ICD-10-CM

## 2018-08-28 DIAGNOSIS — F43.23 ADJUSTMENT DISORDER WITH MIXED ANXIETY AND DEPRESSED MOOD: Primary | ICD-10-CM

## 2018-08-28 RX ORDER — TRAZODONE HYDROCHLORIDE 50 MG/1
50 TABLET, FILM COATED ORAL
Qty: 60 TABLET | Refills: 1 | Status: SHIPPED | OUTPATIENT
Start: 2018-08-28 | End: 2018-10-22

## 2018-08-28 ASSESSMENT — PAIN SCALES - GENERAL: PAINLEVEL: NO PAIN (0)

## 2018-08-28 ASSESSMENT — ANXIETY QUESTIONNAIRES
7. FEELING AFRAID AS IF SOMETHING AWFUL MIGHT HAPPEN: NOT AT ALL
GAD7 TOTAL SCORE: 10
6. BECOMING EASILY ANNOYED OR IRRITABLE: NOT AT ALL
3. WORRYING TOO MUCH ABOUT DIFFERENT THINGS: NEARLY EVERY DAY
5. BEING SO RESTLESS THAT IT IS HARD TO SIT STILL: NOT AT ALL
1. FEELING NERVOUS, ANXIOUS, OR ON EDGE: MORE THAN HALF THE DAYS
2. NOT BEING ABLE TO STOP OR CONTROL WORRYING: NEARLY EVERY DAY

## 2018-08-28 ASSESSMENT — PATIENT HEALTH QUESTIONNAIRE - PHQ9: 5. POOR APPETITE OR OVEREATING: MORE THAN HALF THE DAYS

## 2018-08-28 NOTE — PATIENT INSTRUCTIONS
"Schedule an appointment with Cass Mcpherson, PhD, in psychology.   Stress Relief: Relaxation    Focusing the mind helps provide stress relief. Taking 5 to 10 minutes to practice relaxation each day helps you feel more refreshed. The following exercises can be done almost anywhere. Try one or more until you find what works best for you.  Calm your mind  Find a quiet place where you won't be disturbed. Then try the following:    Sit comfortably. Take off your shoes. Turn off your cell phone and pager. Take a few deep breaths.    Focus your mind on one peaceful thought, image, or word. Then try to hold that thought for 5 minutes.    When other thoughts enter your mind, relax and refocus. Let the invading thoughts fall away.    When you're done, stand up slowly and stretch your arms over your head. With practice, this exercise can help you feel restored.  Calm your body  With practice, you can use mental cues to tell your body how to feel.    Sit comfortably and clear your mind. A few deep breaths will help.    Mentally focus on your left hand and repeat to yourself, \"My left hand feels warm and heavy.\" Keep doing this until your hand does feel heavier and warmer.    Repeat the exercise using your right hand. Then focus on your arms, legs, and feet until your whole body feels relaxed.    When you're done, stand up slowly and stretch your arms overhead.  Visualization  Visualization is like taking a mental vacation. It frees your mind while keeping your body in a calm state. To get started, picture yourself feeling warm and relaxed. Choose a peaceful setting that appeals to you and fill in the details. If you imagine a tropical beach, listen to the waves on the shore. Feel the sun on your face. Dig your toes in the sand. By using the power of your mind, you can take a soothing break when you need to.  Date Last Reviewed: 1/1/2017 2000-2017 [employer]. [address] [facility], [place] [address]. All rights " reserved. This information is not intended as a substitute for professional medical care. Always follow your healthcare professional's instructions.      Stress Relief: Changing Your Response  You are the only person responsible for your thoughts and actions. This simple idea is your most powerful tool for managing stress. Start by having realistic expectations. Then learn to recognize what you can and can't control. Finally, think about ways to change your response. With practice, you can learn to let go of stressful ways of thinking. You are the only person responsible for your thoughts and actions.  Have realistic expectations  When it comes to events that cause you stress, ask yourself:    What are my expectations?    How likely is it that my expectations, good or bad, will be met? Are they realistic?    If they aren't met, do I have to respond by feeling badly? How can I work with other outcomes?  Understand what you can do  To get better at managing stress, try these tips:    Put the stressor in perspective. Will being late to work really get you fired?    Be flexible and look for answers. If you re stuck in traffic and your car is stopped or you are not the , try calling to let people know you re on the way.    Plan ahead for next time. If being late is a worry, plan to leave a few minutes earlier.  Make mountains into molehills  A common cause of stress is feeling as if you have to solve all your problems at once. To shake this feeling, learn to take things one step at a time. Try to break big problems into smaller tasks that you can handle. That way, worries that seem like mountains become little hills you can climb over. Remember, taking small steps will carry you forward. If you find you can t manage your stress, or your reactions are becoming more frequent or violent, get professional help.   Date Last Reviewed: 1/1/2017 2000-2017 [employer]. [address] [facility], [place]  [address]. All rights reserved. This information is not intended as a substitute for professional medical care. Always follow your healthcare professional's instructions.

## 2018-08-28 NOTE — NURSING NOTE
Chief Complaint   Patient presents with     Anxiety     Follow up.     Weight Loss     Follow up.      Insomnia     Follow up after starting trazodone.

## 2018-08-28 NOTE — MR AVS SNAPSHOT
"              After Visit Summary   8/28/2018    Lynnette Mata    MRN: [id number]           Patient Information     Date Of Birth          1935        Visit Information        Provider Department      8/28/2018 6:45 PM Lashon Gonzales APRN [facility]        Today's Diagnoses     Psychophysiological insomnia          Care Instructions    Schedule an appointment with Cass Mcpherson, PhD, in psychology.   Stress Relief: Relaxation    Focusing the mind helps provide stress relief. Taking 5 to 10 minutes to practice relaxation each day helps you feel more refreshed. The following exercises can be done almost anywhere. Try one or more until you find what works best for you.  Calm your mind  Find a quiet place where you won't be disturbed. Then try the following:    Sit comfortably. Take off your shoes. Turn off your cell phone and pager. Take a few deep breaths.    Focus your mind on one peaceful thought, image, or word. Then try to hold that thought for 5 minutes.    When other thoughts enter your mind, relax and refocus. Let the invading thoughts fall away.    When you're done, stand up slowly and stretch your arms over your head. With practice, this exercise can help you feel restored.  Calm your body  With practice, you can use mental cues to tell your body how to feel.    Sit comfortably and clear your mind. A few deep breaths will help.    Mentally focus on your left hand and repeat to yourself, \"My left hand feels warm and heavy.\" Keep doing this until your hand does feel heavier and warmer.    Repeat the exercise using your right hand. Then focus on your arms, legs, and feet until your whole body feels relaxed.    When you're done, stand up slowly and stretch your arms overhead.  Visualization  Visualization is like taking a mental vacation. It frees your mind while keeping your body in a calm state. To get started, picture yourself feeling warm and relaxed. Choose a peaceful " setting that appeals to you and fill in the details. If you imagine a tropical beach, listen to the waves on the shore. Feel the sun on your face. Dig your toes in the sand. By using the power of your mind, you can take a soothing break when you need to.  Date Last Reviewed: 1/1/2017 2000-2017 [employer]. [address] [place] [address], [place] [id number]. All rights reserved. This information is not intended as a substitute for professional medical care. Always follow your healthcare professional's instructions.      Stress Relief: Changing Your Response  You are the only person responsible for your thoughts and actions. This simple idea is your most powerful tool for managing stress. Start by having realistic expectations. Then learn to recognize what you can--and can't--control. Finally, think about ways to change your response. With practice, you can learn to let go of stressful ways of thinking. You are the only person responsible for your thoughts and actions.  Have realistic expectations  When it comes to events that cause you stress, ask yourself:    What are my expectations?    How likely is it that my expectations, good or bad, will be met? Are they realistic?    If they aren't met, do I have to respond by feeling badly? How can I work with other outcomes?  Understand what you can do  To get better at managing stress, try these tips:    Put the stressor in perspective. Will being late to work really get you fired?    Be flexible and look for answers. If you re stuck in traffic and your car is stopped or you are not the , try calling to let people know you re on the way.    Plan ahead for next time. If being late is a worry, plan to leave a few minutes earlier.  Make mountains into molehills  A common cause of stress is feeling as if you have to solve all your problems at once. To shake this feeling, learn to take things one step at a time. Try to break big problems into smaller tasks that  you can handle. That way, worries that seem like mountains become little hills you can climb over. Remember, taking small steps will carry you forward. If you find you can t manage your stress, or your reactions are becoming more frequent or violent, get professional help.   Date Last Reviewed: 1/1/2017 2000-2017 [employer]. [address] [place] [address], [place] [address]. All rights reserved. This information is not intended as a substitute for professional medical care. Always follow your healthcare professional's instructions.                      Follow-ups after your visit        Follow-up notes from your care team     Return in about 8 weeks (around 10/23/2018).      Your next 10 appointments already scheduled     Sep 13, 2018  2:45 PM CDT   Neuro Treatment with Caterina Healy PT   [facility], [place], Physical Therapy - Outpatient ([facility])    [address] [facility], [address]  [place] [address]   [phone]            Oct 22, 2018 12:00 PM CDT   (Arrive by 11:45 AM)   PHYSICAL with Nickolas Davenport MD   [facility] ([facility])    [address] [place] [address] [phone]            Jan 31, 2019 11:00 AM CST   (Arrive by 10:45 AM)   Return Visit with Ariadne Tom MD   [facility] Medicine and Rehabilitation ([facility])    [address] [place] [address]  [address] [facility] [address] [phone]              Who to contact     Please call your clinic at [phone] to:    Ask questions about your health    Make or cancel appointments    Discuss your medicines    Learn about your test results    Speak to your doctor            Additional Information About Your Visit        MyChart Information     [employer] gives you secure access to your electronic health record. If you see a primary care provider, you can also send messages to your care team  and make appointments. If you have questions, please call your primary care clinic.  If you do not have a primary care provider, please call [phone] and they will assist you.      [employer] is an electronic gateway that provides easy, online access to your medical records. With [employer], you can request a clinic appointment, read your test results, renew a prescription or communicate with your care team.     To access your existing account, please contact your [facility] or call [phone] for assistance.        Care EveryWhere ID     This is your Care EveryWhere ID. This could be used by other organizations to access your [place] medical records  [id number]        Your Vitals Were     Pulse Pulse Oximetry BMI (Body Mass Index)             79 98% 23.11 kg/m2          Blood Pressure from Last 3 Encounters:   08/28/18 117/62   08/03/18 147/67   08/02/18 137/73    Weight from Last 3 Encounters:   08/28/18 64.5 kg (142 lb 1.6 oz)   08/03/18 63 kg (139 lb)   08/02/18 63.5 kg (140 lb)              Today, you had the following     No orders found for display       Primary Care Provider Office Phone # Fax #    Nickolas Jose Francisco Davenport [phone]       [address] [address]        Equal Access to Services     WENDIE BRAUN : Hadii sal ku hadasho Soomaali, waaxda luqadaha, qaybta kaalmada adeegyada, katia lopez hayphuong alonzo . So [facility] [phone].    ATENCIÓN: Si habla español, tiene a calabrese disposición servicios gratuitos de asistencia lingüística. Llame al [phone].    We comply with applicable federal civil rights laws and [place] laws. We do not discriminate on the basis of race, color, national origin, age, disability, sex, sexual orientation, or gender identity.            Thank you!     Thank you for choosing [facility]  for your care. Our goal is always to provide you with excellent care. Hearing back from  our patients is one way we can continue to improve our services. Please take a few minutes to complete the written survey that you may receive in the mail after your visit with us. Thank you!             Your Updated Medication List - Protect others around you: Learn how to safely use, store and throw away your medicines at www.disposemymeds.org.          This list is accurate as of 8/28/18  7:00 PM.  Always use your most recent med list.                   Brand Name Dispense Instructions for use Diagnosis    aspirin 81 MG chewable tablet      Take 81 mg by mouth daily.        CALCIUM 600 PO      Take  by mouth daily.        fish oil-omega-3 fatty acids 1000 MG capsule      Take 2 g by mouth daily.        glucosamine 500 MG Caps      Take  by mouth daily.        lisinopril 10 MG tablet    PRINIVIL/ZESTRIL    90 tablet    Take 1 tablet (10 mg) by mouth daily    Essential hypertension, benign, Balance problems, Personal history of fall, Generalized muscle weakness       Multi-vitamin Tabs tablet   Generic drug:  multivitamin, therapeutic with minerals      Take 1 tablet by mouth daily.        simvastatin 20 MG tablet    ZOCOR    90 tablet    Take 1 tablet (20 mg) by mouth daily    Familial hypercholesterolemia, Atypical chest pain       traZODone 50 MG tablet    DESYREL    30 tablet    Take 1 tablet (50 mg) by mouth nightly as needed for sleep    Psychophysiological insomnia

## 2018-08-28 NOTE — PROGRESS NOTES
"[facility] [place]   Lashon Gonzales, LUIS CNP  08/28/2018      Chief Complaint:   Anxiety Follow Up      History of Present Illness:   Lynnette Mata is a 82 year old female with a history of hypertension, insomnia, and anxiety who presents for a follow up. I last saw the patient on 08/03 for anxiety and insomnia. She has been using low dose Trazodone and was given a referral for therapy. She has not seen a therapist since our last visit.     Sleep: Today she feels that her sleep has improved though she feels \"psychologically tired\". The patient sleeps at least 6 hours per night and gets up around 3 times throughout the night to void, which is actually an improvement for her. She is able to fall back asleep. The patient feels that the Trazodone is helpful and has concerns for dosing and what it will look like to come off this medication in the future.    Anxiety: She feels that she naps more frequently and is not as interested in her usual activities. She notes stressors such as selling her house. She feels that she is depended on and has to do everything herself. The patient states that she has always been able to do what she wants in the past though has been unable to lately. She has occasional anxiety attacks. Reading helps and she has not tried deep breathing or other solutions. She is reluctant to try any medication to treat due to concerns of side effects. She has not set up therapy yet, but thinks this might be helpful.    Loss of appetite: The patient states that she has had a decreased appetite and that food doesn't taste good anymore. She forces herself to eat at times.     Review of Systems:   Pertinent items are noted in HPI, remainder of complete ROS is negative.    RUDI-7 SCORE 3/2/2016 8/3/2018 8/28/2018   Total Score 1 11 10       PHQ-9 SCORE 3/2/2016 8/3/2018 8/28/2018   Total Score 0 12 7         Active Medications:     Current Outpatient Prescriptions:      aspirin 81 MG " chewable tablet, Take 81 mg by mouth daily., Disp: , Rfl:      Calcium Carbonate (CALCIUM 600 PO), Take  by mouth daily., Disp: , Rfl:      fish oil-omega-3 fatty acids (FISH OIL) 1000 MG capsule, Take 2 g by mouth daily., Disp: , Rfl:      Glucosamine 500 MG CAPS, Take  by mouth daily., Disp: , Rfl:      lisinopril (PRINIVIL/ZESTRIL) 10 MG tablet, Take 1 tablet (10 mg) by mouth daily, Disp: 90 tablet, Rfl: 3     Multiple Vitamin (MULTI-VITAMIN) per tablet, Take 1 tablet by mouth daily., Disp: , Rfl:      simvastatin (ZOCOR) 20 MG tablet, Take 1 tablet (20 mg) by mouth daily, Disp: 90 tablet, Rfl: 3     traZODone (DESYREL) 50 MG tablet, Take 1 tablet (50 mg) by mouth nightly as needed for sleep, Disp: 60 tablet, Rfl: 1     [DISCONTINUED] traZODone (DESYREL) 50 MG tablet, Take 1 tablet (50 mg) by mouth nightly as needed for sleep, Disp: 30 tablet, Rfl: 1      Allergies:   Nkda [no known drug allergies]      Past Medical History:  Asymptomatic menopausal state  Shingles outbreak  Stricture and stenosis of cervix  Unspecified constipation  Essential hypertension, benign  Post-menopause  Encounter for routine gynecological examination  Genital atrophy of female  Advance care planning  Abnormal cardiovascular stress test  Primary osteoarthritis of both knees     Past Surgical History:  Colonoscopy    Family History:   Myocardial infarction - mother  Cerebrovascular disease - maternal grandmother  Colorectal cancer - maternal aunt  Type I diabetes mellitus - brother  Psychotic disorder, bipolar - brother  Coronary artery disease - father, brother  Parkinsonism -      Social History:   Smoking status: never smoker  Smokeless tobacco: never used  Alcohol use: occasionally     Physical Exam:   /62  Pulse 79  Wt 64.5 kg (142 lb 1.6 oz)  SpO2 98%  BMI 23.11 kg/m2     Constitutional: Alert, oriented, pleasant, no acute distress  Psychiatric: normal mentation, affect and mood    Assessment and Plan:  Adjustment  disorder with mixed anxiety and depressed mood  Psychophysiological insomnia  Lynnette is reluctant to add or change any medications at this point. I reviewed stress management techniques such as deep breathing, social support, and setting small goals, and encouraged her to schedule an appointment with one of our health psychologists. She agrees with the plan. The patient will continue with low dose trazodone; she denies any complications or side effects with her current dose.   - traZODone (DESYREL) 50 MG tablet  Dispense: 60 tablet; Refill: 1     Follow-up: Return in about 8 weeks (around 10/23/2018).      Scribe Disclosure:   I, Ky Zuniga, am serving as a scribe to document services personally performed by LUIS Fortune CNP at this visit, based upon the provider's statements to me. All documentation has been reviewed by the aforementioned provider prior to being entered into the official medical record.     Portions of this medical record were completed by a scribe. UPON MY REVIEW AND AUTHENTICATION BY ELECTRONIC SIGNATURE, this confirms (a) I performed the applicable clinical services, and (b) the record is accurate.     LUIS Fortune CNP

## 2018-08-29 ASSESSMENT — ANXIETY QUESTIONNAIRES: GAD7 TOTAL SCORE: 10

## 2018-08-29 ASSESSMENT — PATIENT HEALTH QUESTIONNAIRE - PHQ9: SUM OF ALL RESPONSES TO PHQ QUESTIONS 1-9: 7

## 2018-09-07 ENCOUNTER — OFFICE VISIT (OUTPATIENT)
Dept: PSYCHOLOGY | Facility: CLINIC | Age: 83
End: 2018-09-07
Payer: COMMERCIAL

## 2018-09-07 DIAGNOSIS — F43.23 ADJUSTMENT DISORDER WITH MIXED ANXIETY AND DEPRESSED MOOD: Primary | ICD-10-CM

## 2018-09-07 NOTE — MR AVS SNAPSHOT
After Visit Summary   9/7/2018    Lynnette Mata    MRN: [id number]           Patient Information     Date Of Birth          1935        Visit Information        Provider Department      9/7/2018 11:00 AM Cass Mcpherson, PhD MAGALY [facility]        Today's Diagnoses     Adjustment disorder with mixed anxiety and depressed mood    -  1       Follow-ups after your visit        Your next 10 appointments already scheduled     Oct 22, 2018 12:00 PM CDT   (Arrive by 11:45 AM)   PHYSICAL with Nickolas Davenport MD   [facility] ([facility])    [address] [place] [address]  [address] [facility] [id number] [phone]            Oct 26, 2018 11:00 AM CDT   (Arrive by 10:45 AM)   Return Visit with Cass Mcpherson PhD LP   [facility] ([facility])    [address] [place] [id number] [phone]            Jan 31, 2019 11:00 AM CST   (Arrive by 10:45 AM)   Return Visit with Ariadne Tom MD   [facility] ([facility])    [address] [place] [id number] [phone]              Who to contact     Please call your clinic at [phone] to:    Ask questions about your health    Make or cancel appointments    Discuss your medicines    Learn about your test results    Speak to your doctor            Additional Information About Your Visit        MyChart Information     [employer] gives you secure access to your electronic health record. If you see a primary care provider, you can also send messages to your care team and make appointments. If you have questions, please call your primary care clinic.  If you do not have a primary care provider, please call [phone] and they will assist you.      [employer] is an electronic gateway that provides easy, online access to your medical records. With  [employer], you can request a clinic appointment, read your test results, renew a prescription or communicate with your care team.     To access your existing account, please contact your [facility] or call [phone] for assistance.        Care EveryWhere ID     This is your Care EveryWhere ID. This could be used by other organizations to access your [place] medical records  [id number]         Blood Pressure from Last 3 Encounters:   08/28/18 117/62   08/03/18 147/67   08/02/18 137/73    Weight from Last 3 Encounters:   08/28/18 64.5 kg (142 lb 1.6 oz)   08/03/18 63 kg (139 lb)   08/02/18 63.5 kg (140 lb)              Today, you had the following     No orders found for display       Primary Care Provider Office Phone # Fax #    Nickolas Davenport [phone] [address] [address]        Equal Access to Services     [facility] DANIEL [facility] : Hadii aad ku hadasho Soomaali, waaxda luqadaha, qaybta kaalmada adeegyada, waxay idiin haygaln madhu alonzo . So [facility] [phone].    ATENCIÓN: Si habla español, tiene a calabrese disposición servicios gratuitos de asistencia lingüística. Llame al [phone].    We comply with applicable federal civil rights laws and [place] laws. We do not discriminate on the basis of race, color, national origin, age, disability, sex, sexual orientation, or gender identity.            Thank you!     Thank you for choosing [facility]  for your care. Our goal is always to provide you with excellent care. Hearing back from our patients is one way we can continue to improve our services. Please take a few minutes to complete the written survey that you may receive in the mail after your visit with us. Thank you!             Your Updated Medication List - Protect others around you: Learn how to safely use, store and throw away your medicines at www.disposemymeds.org.          This list is accurate as of  9/7/18 11:59 PM.  Always use your most recent med list.                   Brand Name Dispense Instructions for use Diagnosis    aspirin 81 MG chewable tablet      Take 81 mg by mouth daily.        CALCIUM 600 PO      Take  by mouth daily.        fish oil-omega-3 fatty acids 1000 MG capsule      Take 2 g by mouth daily.        glucosamine 500 MG Caps      Take  by mouth daily.        lisinopril 10 MG tablet    PRINIVIL/ZESTRIL    90 tablet    Take 1 tablet (10 mg) by mouth daily    Essential hypertension, benign, Balance problems, Personal history of fall, Generalized muscle weakness       Multi-vitamin Tabs tablet   Generic drug:  multivitamin, therapeutic with minerals      Take 1 tablet by mouth daily.        simvastatin 20 MG tablet    ZOCOR    90 tablet    Take 1 tablet (20 mg) by mouth daily    Familial hypercholesterolemia, Atypical chest pain       traZODone 50 MG tablet    DESYREL    60 tablet    Take 1 tablet (50 mg) by mouth nightly as needed for sleep    Psychophysiological insomnia

## 2018-09-13 ENCOUNTER — HOSPITAL ENCOUNTER (OUTPATIENT)
Dept: PHYSICAL THERAPY | Facility: CLINIC | Age: 83
Setting detail: THERAPIES SERIES
End: 2018-09-13
Attending: PHYSICAL MEDICINE & REHABILITATION
Payer: MEDICARE

## 2018-09-13 PROCEDURE — 40000719 ZZHC STATISTIC PT DEPARTMENT NEURO VISIT: Performed by: PHYSICAL THERAPIST

## 2018-09-13 PROCEDURE — G8979 MOBILITY GOAL STATUS: HCPCS | Mod: GP,CI | Performed by: PHYSICAL THERAPIST

## 2018-09-13 PROCEDURE — 97112 NEUROMUSCULAR REEDUCATION: CPT | Mod: GP | Performed by: PHYSICAL THERAPIST

## 2018-09-13 PROCEDURE — G8978 MOBILITY CURRENT STATUS: HCPCS | Mod: GP,CJ | Performed by: PHYSICAL THERAPIST

## 2018-09-13 PROCEDURE — G8980 MOBILITY D/C STATUS: HCPCS | Mod: GP,CJ | Performed by: PHYSICAL THERAPIST

## 2018-09-13 PROCEDURE — 97110 THERAPEUTIC EXERCISES: CPT | Mod: GP | Performed by: PHYSICAL THERAPIST

## 2018-09-13 NOTE — PROGRESS NOTES
09/13/18 1400   Signing Clinician's Name / Credentials   Signing clinician's name / credentials Caterina Healy, PT, DPT, NCS, ATP   Functional Gait Assessment (HEATH Car., Rajiv GVasile F., et al. (2004))   1. GAIT LEVEL SURFACE 1  (slow)   2. CHANGE IN GAIT SPEED 1   3. GAIT WITH HORIZONTAL HEAD TURNS 3   4. GAIT WITH VERTICAL HEAD TURNS 2   5. GAIT AND PIVOT TURN 2  (slow turn)   6. STEP OVER OBSTACLE 1   7. GAIT WITH NARROW BASE OF SUPPORT 1   8. GAIT WITH EYES CLOSED 2   9. AMBULATING BACKWARDS 3   10. STEPS 1   Total Functional Gait Assessment Score   TOTAL SCORE: (MAXIMUM SCORE 30) 17

## 2018-09-13 NOTE — PROGRESS NOTES
"Outpatient Physical Therapy Discharge Note     Patient: Lynnette Mata  : 1935    Beginning/End Dates of Reporting Period:  2018 to 2018    Referring Provider: Ariadne Tom MD    Therapy Diagnosis: Impaired functional mobility, balance, gait, endurance     Client Self Report: \"I'm doing so much better.\" Pt states the anxiety and depression have lifted and that it \"just took time\". She is participating in pool therapy 1-2x/week as well as continuing her home exercises. Still has knee pain - mostly unchanged.     Objective Measurements:    Functional Gait Assessment (FGA): The FGA assesses postural stability during various walking tasks.   Gait assistive device used: none  Patient Score: 17/30. Improved from 11/20 at initial eval and 16/20 at last assessment.   Scores of <22/30 have been correlated with predicting falls in community-dwelling older adults according to Josep & Tai 2010.   Scores of <18/30 have been correlated with increased risk for falls in patients with Parkinsons Disease according to Maximo Rosas Zhou et al 2014.  Minimal Detectable Change for patients with acute/chronic stroke = 4.2 according to Thidinora & Ritschel 2009  Minimal Detectable Change for patients with vestibular disorder = 8 according to Wrisley & Lujan 2010    Goals:  Goal Identifier TUG   Goal Description Pt will complete the TUG in less than 13.5 seconds in order to reduce her falls risk.   Target Date 18   Date Met  18   Progress: MET     Goal Identifier Gait speed   Goal Description Pt will complete the 25-foot walk test in less than 8 seconds to improve her efficiency and safety of walking at home and in the community.   Target Date 18   Date Met   N/A   Progress: Improved but remains variable depending on pain     Goal Identifier Dynamic balance   Goal Description Pt will demonstrate improved dynamic balance as measured by an FGA score of at least 20/30, in order to improve her safety " and reduce falls risk.   Target Date 08/24/18   Date Met   N/A   Progress: Improved from 11/20 to 17/20     Goal Identifier HEP   Goal Description Pt will demonstrate independent performance of her home exercise program to maximize her ability to manage her condition and promote optimal wellness.   Target Date 08/24/18   Date Met  08/02/18   Progress: MET       Progress Toward Goals:   Progress this reporting period: continued improvement in FGA. Knee pain continues to be a barrier to further progress.     Plan:  Discharge from therapy.    Discharge:    Reason for Discharge: No further expectation of progress due to knee pain. Patient would benefit from continued regular exercise, and is now independent with her HEP and has joined a community pool class that will allow her to maintain her mobility and continue working on her strength and balance.     Equipment Issued: None    Discharge Plan: Patient to continue home program and community based pool exercise classes.

## 2018-09-27 NOTE — PROGRESS NOTES
Health Psychology                                      Department of Medicine                                           [facility] Mail Code [id number]    Dana Hu, Ph.D., L.P. [phone]  [address] [place] [address], [facility] Any Beck, Ph.D.,  L.P. [phone]  [place] [address]  Bhargav Vitale, Ph.D., A.B.P.P., L.P. [phone]      Cass Mcpherson, PhD, LP [phone]        REFERRAL SOURCE  NP in [facility], Thania Carty    CHIEF COMPLAINT/REASON FOR VISIT  Psychological evaluation for coping with recent onset of insomnia, anxiety and depression.  Patient coping with death of spouse May, 2017, fall with injury and declining physical functioning and decision to sell their home and move into facility allowing for progressively elevating level of care.    Patient was seen today for a 90 minute standard diagnostic assessment in clinical health psychology.    HISTORY OF PRESENT ILLNESS  The patient is a very pleasant 83 year old  female who previously worked in [employer] at [employer].  She lives alone in her own home.  The patient reported a prior history of occasional blue mood fluctuating with periods of high energy that typically resolved within days and that she had required no treatment for bipolar of depressive disorders.  She reported that her   in May, 2017 after a 4 year history of PD with lewy body veriant.  She reported that her grief resolved within a few months.  She was able to enjoy a trip to [facility] after his death.  Upon returning, she suffered a fall and required an emergency department visit.  After this, she described worsening of her physical functioning and described noticing slowing of her gait and overall soreness and fatigue.  She also reported a two month period where concentration was difficult and she could not read.  She was evaluated and began physical therapy.  She reported that the incident scared  "her and she has decided that it may be best for her to move into a different home where there may be care available should she need it in the future, \"better to do it now before I have to\".  She does not have children and her executor lives in [place] and does not visit often.  She has some friends in the area but does not want to be a burden on anyone.  She is in the process of going through her home, purging items and making multiple repairs for home sale.  She reported that she has felt overwhelmed by the process.  She described noticing declining mood over the spring and summer and believes that her difficulty sleeping may have interfered with her ability to handle increased stress.  She was started on trazodone but reported that she may be experiencing side effects from this medication including difficulty waking up in the morning.  She tried to half the dose to 25 and described better morning wakening.  She described feeling suicidal for the first time in her life, simply reporting that she decided that she \"did not want to live any longer and was no longer enjoying life. She was also experiencing possible panic attacks upon awakening in the mornings and that food did not taste as good.  She reported that she has lost weight.       Anxiety/Insomnia (below taken from medical record)  In August, 2018, patient presented to her primary complaining that she had been feeling depressed for the past 2-3 months.  From the case note, \"She does not know what would have triggered these feelings; she has had times before where she has \"felt down\" but has been able to \"tough it out\" before; she has never done therapy and has not been on medication for this before either. Now, she is struggling to \"tough it out.\" She feels like \"things are hopeless\" and that she has too many decisions to make. Her  passed away a year ago, and she states that she was able to handle that without any real problem at that time--she soon " "after had her kitchen remodeled and was able to clean out her home. This activity and decision making \"came to a halt\" this spring, especially as she is not able to do all of this alone. Her plan was to sell her home, and preparation for this has come to a halt, as well as any other decision making. Right now, she has help from a handy-woman who helps her around her home, as well as friends who help her with yard work. All of this, however, is feeling more overwhelming than usual and she has anxiety \"like she has never had before.\" Particularly, later in the day, she begins to feel on edge with growing anxiety. The patient states that she has been \"going through the motions of living.\" She has been waking more at night and worrying about things--she has no trouble falling asleep, but she struggles to stay asleep. Lynnette falls asleep around 2200, but wakes up often because she has to use the bathroom. She also endorses notable weight loss--over ten pounds within the last few months without trying--and so she has begun to make herself eat more, although she does not enjoy this. The patient denies any plan to harm herself, abdominal pain, severe fatigue, shortness of breath, skin changes, blood loss, and heart palpitations.  Notably, she does not want to talk about any of this with her friends or family--she thinks that this would only reinforce her symptoms, rather than help them. She does not want them to worry about her, in particular. However, she states that she would be able to stay active with some friends without talking about this with them. \".  She was given a referral to contact health psychology, but she did not pursue.  She was again seen by primary in lat august and the chart note indicated that she was experiencing same symptoms and that anxiety appeared to have increased.  From the note:  Sleep: Today she feels that her sleep has improved though she feels \"psychologically tired\". The patient sleeps at " "least 6 hours per night and gets up around 3 times throughout the night to void, which is actually an improvement for her. She is able to fall back asleep. The patient feels that the Trazodone is helpful and has concerns for dosing and what it will look like to come off this medication in the future.    Anxiety: She feels that she naps more frequently and is not as interested in her usual activities. She notes stressors such as selling her house. She feels that she is depended on and has to do everything herself. The patient states that she has always been able to do what she wants in the past though has been unable to lately. She has occasional anxiety attacks. Reading helps and she has not tried deep breathing or other solutions. She is reluctant to try any medication to treat due to concerns of side effects. She has not set up therapy yet, but thinks this might be helpful.  Loss of appetite: The patient states that she has had a decreased appetite and that food doesn't taste good anymore. She forces herself to eat at times.    Current Evaluation  She reported today that she is largely feeling better and has had no suicidal thoughts.  She believes she is adjusting to the idea that she will be selling her home and had started to complete work to prepare the house for the market.  She is also giving away things and feels better able to cope with the \"enormity of the task\".  She described multiple interests including choir involvement at [employer], quilting, reading mysteries and travel.  She reported that she is in a good financial position and has inherited a family farm in [place].  She believes that she is no longer grieving her  death but reported that she misses their friendship and companionship.  She is grateful for their relationship quality and the many good years they shared.  She also reported that her panic symptoms have decreased and although she has moments of elevated anxiety, she has not had " "a panic attack in several weeks.  The patient denied suicidal ideation, is future oriented, and demonstrated appropriate range of affect during today s assessment. She has enjoyed traveling and has been on multiple trips and has \"seen the world\".  She continues to struggle to find meaning for her life but denies interest in killing herself.  She reported that her Latter-day beliefs support that there is a reason for her continued living.       Review of Systems:   Pertinent items are noted in HPI, remainder of complete ROS is negative.    RUDI-7 SCORE 3/2/2016 8/3/2018 8/28/2018   Total Score 1 11 10         PHQ-9 SCORE 3/2/2016 8/3/2018 8/28/2018   Total Score 0 12 7      MEDICAL HISTORY (FROM 2/2018 VISIT WITH MD GAUDENCIO and PMR provider)    Ms. Mata is seen for ongoing care of HTN, hyperlipidemia, and recent fall. She was going to the [employer] and reached for the door handle but missed it and fell backwards and hit her head. She had a laceration on her scalp but no other significant injuries. No preceding cardiac symptoms. No lightheadedness.      O) /69  Pulse 71  Temp 97.6  F (36.4  C) (Oral)  Ht 1.665 m (5' 5.55\")  Wt 63.5 kg (140 lb)  SpO2 95%  Breastfeeding? No  BMI 22.91 kg/m2  Nl strength exam, nl mentation, negative romberg  Nl heart exam- RRR no MRG  Nl lung exam, good air entry bilaterally, no rales or wheezes  Nl skin exam  Nl HEENT exam  Nl abd exam     1) Falls. Balance problems are main concern. Also has had progressive weakness in legs. Given she's on a statin will check CK and consider a drug holiday. Will also check urine MMA to r/o B12 deficiency.Thyroid has been fine. Head CT ok. PMR referral for evaluation and targeted strengthening and balance regimen is recommended.  2) HTN. BP initially elevated on 5 mg lisinopril daily. Normalized on recheck. Follow clinically and repeat BMP next draw.   3)  Several years of progressively difficulties with her gait which has been accompanied " by lower extremity weakness and pain. She has been seen in PM&R clinic for similar symptoms in the past in , 2015 and most recently by Dr. Tom on 7/10/2018. Patient complains of progressive weakness and pain in both legs, though states that her right leg is worse than her left. She also continues to have some midline low back pain that comes and goes. Patient denies any shooting pain down her legs, burning or sensory symptoms in her legs. Symptoms are worse with walking or climbing stairs. Query peripheral neuropathy vs lumbar radiculopathy.     History of Present Illness:   She reported that her brother was diagnosed with bipolar disorder and that he had multiple suicide attempts.  She also reported that her father suicided at the age of 51.      SYSTEMS REVIEW  Caffeine: Patient reported drinking 1 cup of coffee per day.  Tobacco: Patient denied history or current use.   Alcohol: Patient denied history or current alcohol abuse or dependence.  Illegal/recreational drug usage: Patient denied history or current use.     PAST MEDICAL/SURGICAL HISTORY  Patient denied ever having seen a psychologist, psychiatrist or other type of mental health provider.     SOCIAL HISTORY  SOCIAL/DEVELOPMENTAL HISTORY  She reported having one brother, and described her childhood as difficult given that their father was diagnosed with bipolar disorder and father suicided when he was 51. Patient denied experiencing any learning difficulties in school, and stated that she graduated from high school and college and worked as a .  She  her when she was 36 and she described a wonderful marriage of over 40 years.  They did not have children.  She reported that she has a moderately close relationship with her brothers 4 children but they do not have regular contact.  She denied any  service.  She is retired and has no financial concerns. Patients   in May 2017 after 4 years with PD.  She enjoys her  Jainism and is part of the choir, she also [employer] and is an avid reader.  She has also traveled extensively and has lived abroad on several occasions.  Her last trip was in 2017 when she went to \[facility]\"".      FAMILY HISTORY  Patient reported history of Bipolar Disorder in her brother and father.  Father suicided when he was age 51.      MENTAL STATUS EXAMINATION  Appearance/Behavior: The patient was on time, appropriately groomed and dressed, and demonstrated good eye contact. Her speech was clear and consistent, and there was no evidence of psychomotor agitation.   Consciousness/Orientation: Alert and oriented to person, place, time, and situation.   Cooperation/Reliability: The patient appeared to honestly respond to questions about psychosocial functioning. Patient is deemed a reliable historian.   Mood/Affect: Mood euthymic; appropriate range of affect. Patient reported some depressed mood and some symptoms of anxiety, particularly in the early morning.  She also stated that she is experiencing some feelings of overwhelm surrounding selling her home.      Appetite: Patient described good appetite following a period of appetite decline and some weight loss.    Sleep: Patient reported sometimes finding it difficult to stay asleep, particularly after waking up to void.  She reported that Trazodone has helped.     Body image: Patient reported no problems with body image and she is enjoying having less weight.    Speech/Language: Speech was logical and coherent. Speech was of normal rate, rhythm and volume.   Thought Form: Overall logical and organized   Thought Content: Appropriate to interview and situation. Patient appeared focused on her concerns about dealing with selling home and coping with loss of functioning and changes after losing spouse.   Perception:  Patient denied any difficulties with a thought disorder, including auditory or visual hallucinations. Denied any history of obsessive-compulsive disorder  or of wero.   Cognition/Memory: No difficulties apparent upon interview; not formally assessed.    Fund of Knowledge: Consistent with age, level of education, and life experience.   Abstract Reasoning: Appropriate; not formally assessed.  Judgment: No difficulties apparent upon interview.  Insight/Motivation: Appropriate to situation. The patient is seeking assistance for coping with anxiety and depression  Suicide/Assault: Patient denies current suicidal or assaultive ideation, plan, or intent but she endorsed thoughts that she was done living as recently as August 2018.  She reported that she is not feeling suicidal now and reported that her Zoroastrianism beliefs will prevent her from suicidal plan or intent.    IMPRESSION:  The patient is a 83 year old,  female,  in May 2017 who is a retired .  She is an avid traveler who also sings in her [employer] choir and enjoys quilting and reading.  She is in the midst of preparing her home for sale and will be moving into a facility that allows transition to more skill level when needed.  She is experiencing symptoms of both depression and anxiety following a fall and decline in overall functioning and fears of living alone.  She endorsed some previous suicidal ideation but denied currently and gave reason to not kill herself as her Zoroastrianism concerns.      Time In:  11:00  Time Out:  12:00    DIAGNOSIS:  Axis I Adjustment disorder with mixed dep and anx   Axis II Deferred   Axis III Please see medical records for details.    Axis IV Psychosocial and Environmental Stressors: Health & upcoming move, loss of      PLAN:  The following recommendations were made to the patient:    1. Follow-up Services: We recommended a follow-up appointment with Dr. Mcpherson in clinical health psychology for cognitive behavioral therapy to aid with adjustment to, and coping with, current depression and anxiety and support during transition.       The patient appeared  amenable to these recommendations and an appointment has been set up with Dr. Cass Mcpherson  for 4 weeks (earliest she would agree to). We remain available to the patient as needed.    Cass Mcpherson, Ph.D., L.P.

## 2018-10-11 DIAGNOSIS — R07.89 ATYPICAL CHEST PAIN: ICD-10-CM

## 2018-10-11 DIAGNOSIS — E78.01 FAMILIAL HYPERCHOLESTEROLEMIA: ICD-10-CM

## 2018-10-13 RX ORDER — SIMVASTATIN 20 MG
20 TABLET ORAL DAILY
Qty: 90 TABLET | Refills: 0 | Status: SHIPPED | OUTPATIENT
Start: 2018-10-13 | End: 2018-10-22

## 2018-10-13 NOTE — TELEPHONE ENCOUNTER
simvastatin (ZOCOR) 20 MG tablet      Last Written Prescription Date:  10/4/17  Last Fill Quantity: 90,   # refills: 3  Last Office Visit :8/28/18  Future Office visit:  10/22/18      Routing  Because: lab past due. 90 day to pharmacy  FYI.

## 2018-10-15 ASSESSMENT — ACTIVITIES OF DAILY LIVING (ADL)
IN_THE_PAST_7_DAYS,_DID_YOU_NEED_HELP_FROM_OTHERS_TO_TAKE_CARE_OF_THINGS_SUCH_AS_LAUNDRY_AND_HOUSEKEEPING,_BANKING,_SHOPPING,_USING_THE_TELEPHONE,_FOOD_PREPARATION,_TRANSPORTATION,_OR_TAKING_YOUR_OWN_MEDICATIONS?: N
IN_THE_PAST_7_DAYS,_DID_YOU_NEED_HELP_FROM_OTHERS_TO_PERFORM_EVERYDAY_ACTIVITIES_SUCH_AS_EATING,_GETTING_DRESSED,_GROOMING,_BATHING,_WALKING,_OR_USING_THE_TOILET: N

## 2018-10-22 ENCOUNTER — OFFICE VISIT (OUTPATIENT)
Dept: INTERNAL MEDICINE | Facility: CLINIC | Age: 83
End: 2018-10-22
Payer: COMMERCIAL

## 2018-10-22 VITALS
OXYGEN SATURATION: 95 % | SYSTOLIC BLOOD PRESSURE: 111 MMHG | BODY MASS INDEX: 23.58 KG/M2 | HEART RATE: 69 BPM | DIASTOLIC BLOOD PRESSURE: 64 MMHG | WEIGHT: 145 LBS

## 2018-10-22 DIAGNOSIS — F31.70 BIPOLAR DISORDER IN FULL REMISSION, MOST RECENT EPISODE UNSPECIFIED TYPE (H): ICD-10-CM

## 2018-10-22 DIAGNOSIS — M17.0 PRIMARY OSTEOARTHRITIS OF BOTH KNEES: ICD-10-CM

## 2018-10-22 DIAGNOSIS — Z91.81 PERSONAL HISTORY OF FALL: ICD-10-CM

## 2018-10-22 DIAGNOSIS — E78.01 FAMILIAL HYPERCHOLESTEROLEMIA: ICD-10-CM

## 2018-10-22 DIAGNOSIS — M62.81 GENERALIZED MUSCLE WEAKNESS: ICD-10-CM

## 2018-10-22 DIAGNOSIS — Z23 NEED FOR PROPHYLACTIC VACCINATION AND INOCULATION AGAINST INFLUENZA: Primary | ICD-10-CM

## 2018-10-22 DIAGNOSIS — R07.89 ATYPICAL CHEST PAIN: ICD-10-CM

## 2018-10-22 DIAGNOSIS — I10 ESSENTIAL HYPERTENSION, BENIGN: ICD-10-CM

## 2018-10-22 DIAGNOSIS — R26.89 BALANCE PROBLEMS: ICD-10-CM

## 2018-10-22 RX ORDER — SIMVASTATIN 20 MG
20 TABLET ORAL DAILY
Qty: 90 TABLET | Refills: 0 | Status: SHIPPED | OUTPATIENT
Start: 2018-10-22 | End: 2019-04-15

## 2018-10-22 RX ORDER — LISINOPRIL 10 MG/1
10 TABLET ORAL DAILY
Qty: 90 TABLET | Refills: 3 | Status: SHIPPED | OUTPATIENT
Start: 2018-10-22 | End: 2019-12-10

## 2018-10-22 ASSESSMENT — PAIN SCALES - GENERAL: PAINLEVEL: MILD PAIN (2)

## 2018-10-22 NOTE — NURSING NOTE
Lynnette Mata      1.  Has the patient received the information for the influenza vaccine? YES    2.  Does the patient have any of the following contraindications?     Allergy to eggs? No     Allergic reaction to previous influenza vaccines? No     Any other problems to previous influenza vaccines? No     Paralyzed by Guillain-Montevideo syndrome? No     Currently pregnant? NO     Current moderate or severe illness? No     Allergy to contact lens solution? No    3.  The vaccine has been administered in the usual fashion and the patient was instructed to wait 20 minutes before leaving the building in the event of an allergic reaction: YES    Vaccination given by Eileen Euceda LPN at 11:42 AM on 10/22/2018.    Recorded by Eileen Euceda

## 2018-10-22 NOTE — PATIENT INSTRUCTIONS
[facility] Medication Refill Request Information:  * Please contact your pharmacy regarding ANY request for medication refills.  ** [facility] Prescription Fax = [phone]  * Please allow 3 business days for routine medication refills.  * Please allow 5 business days for controlled substance medication refills.     [facility] Test Result notification information:  *You will be notified with in 7-10 days of your appointment day regarding the results of your test.  If you are on MyChart you will be notified as soon as the provider has reviewed the results and signed off on them.    [facility]: [phone]

## 2018-10-22 NOTE — NURSING NOTE
Chief Complaint   Patient presents with     Physical     Pt is here for annual physical.      Eileen Mohr LPN at 11:40 AM on 10/22/2018.

## 2018-10-22 NOTE — MR AVS SNAPSHOT
After Visit Summary   10/22/2018    Lynnette Mata    MRN: [id number]           Patient Information     Date Of Birth          1935        Visit Information        Provider Department      10/22/2018 12:00 PM Nickolas Davenport MD [facility]        Today's Diagnoses     Need for prophylactic vaccination and inoculation against influenza    -  1    Essential hypertension, benign        Primary osteoarthritis of both knees        Bipolar disorder in full remission, most recent episode unspecified type (H)        Balance problems        Personal history of fall        Generalized muscle weakness        Familial hypercholesterolemia        Atypical chest pain          Care Instructions    Primary Care Center Medication Refill Request Information:  * Please contact your pharmacy regarding ANY request for medication refills.  ** [facility] Prescription Fax = [phone]  * Please allow 3 business days for routine medication refills.  * Please allow 5 business days for controlled substance medication refills.     Primary Care Center Test Result notification information:  *You will be notified with in 7-10 days of your appointment day regarding the results of your test.  If you are on MyChart you will be notified as soon as the provider has reviewed the results and signed off on them.    Primary [facility]: [phone]             Follow-ups after your visit        Follow-up notes from your care team     Return in about 1 year (around 10/22/2019).      Your next 10 appointments already scheduled     Jan 31, 2019 11:00 AM CST   (Arrive by 10:45 AM)   Return Visit with Ariadne Tom MD   [facility] ([facility])    [address] [place] [id number] [phone]              Who to contact     Please call your clinic at [phone] to:    Ask questions about your health    Make or cancel  appointments    Discuss your medicines    Learn about your test results    Speak to your doctor            Additional Information About Your Visit        [employer] Information     [employer] gives you secure access to your electronic health record. If you see a primary care provider, you can also send messages to your care team and make appointments. If you have questions, please call your primary care clinic.  If you do not have a primary care provider, please call [phone] and they will assist you.      [employer] is an electronic gateway that provides easy, online access to your medical records. With [employer], you can request a clinic appointment, read your test results, renew a prescription or communicate with your care team.     To access your existing account, please contact your [facility] or call [phone] for assistance.        Care EveryWhere ID     This is your Care EveryWhere ID. This could be used by other organizations to access your [facility] medical records  [id number]        Your Vitals Were     Pulse Pulse Oximetry Breastfeeding? BMI (Body Mass Index)          69 95% No 23.58 kg/m2         Blood Pressure from Last 3 Encounters:   10/22/18 111/64   08/28/18 117/62   08/03/18 147/67    Weight from Last 3 Encounters:   10/22/18 65.8 kg (145 lb)   08/28/18 64.5 kg (142 lb 1.6 oz)   08/03/18 63 kg (139 lb)              We Performed the Following     FLU VACCINE, INCREASED ANTIGEN, PRESV FREE, AGE 65+ [69738]     ZOSTER VACCINE RECOMBINANT ADJUVANTED IM NJX          Today's Medication Changes          These changes are accurate as of 10/22/18 12:25 PM.  If you have any questions, ask your nurse or doctor.               Stop taking these medicines if you haven't already. Please contact your care team if you have questions.     traZODone 50 MG tablet   Commonly known as:  DESYREL   Stopped by:  Nickolas Davenport MD                Where to get your medicines      These  medications were sent to [facility] [id number] - [address] [place] [address] [facility] [address]     Phone:  [phone]     lisinopril 10 MG tablet    simvastatin 20 MG tablet                Primary Care Provider Office Phone # Fax #    Nickolas Jose Francisco Davenport [phone] [address] [facility] [address]  [facility] [address]        Equal Access to Services     WENDIE BRAUN : Hadii aad ku hadasho Soomaali, waaxda luqadaha, qaybta kaalmada adeegyada, waxay idiin hayaan adeeg kharash ladestiny keys. So [facility] [phone].    ATENCIÓN: Si habla español, tiene a calabrese disposición servicios gratuitos de asistencia lingüística. [facility] [phone].    We comply with applicable federal civil rights laws and [place] laws. We do not discriminate on the basis of race, color, national origin, age, disability, sex, sexual orientation, or gender identity.            Thank you!     Thank you for choosing [facility]  for your care. Our goal is always to provide you with excellent care. Hearing back from our patients is one way we can continue to improve our services. Please take a few minutes to complete the written survey that you may receive in the mail after your visit with us. Thank you!             Your Updated Medication List - Protect others around you: Learn how to safely use, store and throw away your medicines at www.disposemymeds.org.          This list is accurate as of 10/22/18 12:25 PM.  Always use your most recent med list.                   Brand Name Dispense Instructions for use Diagnosis    aspirin 81 MG chewable tablet      Take 81 mg by mouth daily.        CALCIUM 600 PO      Take  by mouth daily.        fish oil-omega-3 fatty acids 1000 MG capsule      Take 2 g by mouth daily.        glucosamine 500 MG Caps      Take  by mouth daily.        lisinopril 10 MG tablet    PRINIVIL/ZESTRIL    90 tablet    Take 1 tablet (10 mg) by mouth  daily    Essential hypertension, benign, Balance problems, Personal history of fall, Generalized muscle weakness       Multi-vitamin Tabs tablet   Generic drug:  multivitamin, therapeutic with minerals      Take 1 tablet by mouth daily.        simvastatin 20 MG tablet    ZOCOR    90 tablet    Take 1 tablet (20 mg) by mouth daily    Familial hypercholesterolemia, Atypical chest pain

## 2018-10-22 NOTE — PROGRESS NOTES
"SUBJECTIVE:  Lynnette Mata is a 83 year old female presents for   Chief Complaint   Patient presents with     Physical     Pt is here for annual physical.       Doing well. Just got back from the [place] on vacation. Deep depression over the summer- felt like \"my personality dried up\" but now back to normal feeling great. Saw a counselor once and used Trazodone for a period of time for sleep-- now doing great. Wt loss has resolved and appetite great. Brother with severe BAD- feels she has \"a touch of that.\" Understands there is a chemical reason for it and she'll get through it.   of cancer last year- got through the grieving process. Selling her house and simplifying finances. She is \"well-off\" and has no money worries. Family close.     Medications and allergies were reviewed by me today.   Current Outpatient Prescriptions   Medication     aspirin 81 MG chewable tablet     Calcium Carbonate (CALCIUM 600 PO)     fish oil-omega-3 fatty acids (FISH OIL) 1000 MG capsule     Glucosamine 500 MG CAPS     lisinopril (PRINIVIL/ZESTRIL) 10 MG tablet     Multiple Vitamin (MULTI-VITAMIN) per tablet     simvastatin (ZOCOR) 20 MG tablet     No current facility-administered medications for this visit.        Answers for HPI/ROS submitted by the patient on 10/15/2018   General Symptoms: No  Skin Symptoms: No  HENT Symptoms: No  EYE SYMPTOMS: No  HEART SYMPTOMS: No  LUNG SYMPTOMS: No  INTESTINAL SYMPTOMS: No  URINARY SYMPTOMS: No  GYNECOLOGIC SYMPTOMS: No  BREAST SYMPTOMS: No  SKELETAL SYMPTOMS: No  BLOOD SYMPTOMS: No  NERVOUS SYSTEM SYMPTOMS: No  MENTAL HEALTH SYMPTOMS: No    PHYSICAL EXAM:  /64  Pulse 69  Wt 65.8 kg (145 lb)  SpO2 95%  Breastfeeding? No  BMI 23.58 kg/m2    Constitutional: no distress, comfortable, pleasant   Eyes: anicteric, normal extra-ocular movements   Ears, Nose and Throat: tympanic membranes clear, nose clear and free of lesions, throat clear, neck supple with full range of " motion, no thyromegaly.   Cardiovascular: regular rate and rhythm, normal S1 and S2, no murmurs, rubs or gallops, peripheral pulses full and symmetric   Respiratory: clear to auscultation, no wheezes or crackles, normal breath sounds   Gastrointestinal: positive bowel sounds, nontender, no hepatosplenomegaly, no masses   Musculoskeletal: full range of motion, no edema   Skin: no concerning lesions, no jaundice   Neurological: normal mentation, normal gait, no tremor   Psychological: appropriate mood   Lymphatic: no cervical, axillary or inguinal lymphadenopathy    ASSESSMENT/PLAN:    1) HCM. Shingrix vaccine ordered. Flu given. Colon screening UTD- no further studies needed. Good diet/exercise habits; very social and engaged at [employer] and with family. No further Paps needed.   2) HTN. BP in excellent range on 10 mg lisinopril without side effects. No changes.   3) Hyperlipidemia. Continue statin for elevated ASCVD risk. No reported side effects. Excellent function and quality of life.  4) Mental Health.  5) Knee pain- chronic bilateral OA. Stable. Not interested in knee replacement surgery. Did PT regularly and does Vortex walking in the pool at the [facility] twice weekly.   6) Fall risk- no falls since last winter. She is now MUCH more careful since that event at the [place].     Over 15 min of 25 min visit spent in care coordination and counseling related to the above issues.    Nickolas Davenport MD, FACP, FAAP

## 2019-01-28 ENCOUNTER — TELEPHONE (OUTPATIENT)
Dept: PHYSICAL MEDICINE AND REHAB | Facility: CLINIC | Age: 84
End: 2019-01-28

## 2019-01-28 NOTE — TELEPHONE ENCOUNTER
[facility] Call Center    Phone Message    May a detailed message be left on voicemail: yes    Reason for Call: Other: Patient is wanting to cancel and reschedule the appointment with Dr. Tom as the current Medicare Supplement plan does not cover the [facility], please call her back to reschedule with a date after Feb 1st      Action Taken: Message routed to:  [facility] ([facility]): DONNA

## 2019-02-11 ENCOUNTER — DOCUMENTATION ONLY (OUTPATIENT)
Dept: CARE COORDINATION | Facility: CLINIC | Age: 84
End: 2019-02-11

## 2019-02-28 ENCOUNTER — OFFICE VISIT (OUTPATIENT)
Dept: PHYSICAL MEDICINE AND REHAB | Facility: CLINIC | Age: 84
End: 2019-02-28
Payer: MEDICARE

## 2019-02-28 VITALS
RESPIRATION RATE: 18 BRPM | BODY MASS INDEX: 24.28 KG/M2 | DIASTOLIC BLOOD PRESSURE: 70 MMHG | SYSTOLIC BLOOD PRESSURE: 136 MMHG | TEMPERATURE: 97.8 F | OXYGEN SATURATION: 95 % | WEIGHT: 149.3 LBS | HEART RATE: 71 BPM

## 2019-02-28 DIAGNOSIS — Z74.09 IMPAIRED FUNCTIONAL MOBILITY, BALANCE, GAIT, AND ENDURANCE: ICD-10-CM

## 2019-02-28 DIAGNOSIS — R26.89 IMPAIRMENT OF BALANCE: Primary | ICD-10-CM

## 2019-02-28 ASSESSMENT — PAIN SCALES - GENERAL: PAINLEVEL: NO PAIN (0)

## 2019-02-28 NOTE — PROGRESS NOTES
"[facility]   PM&R clinic note        Interval history:   Lynnette Mata presents to clinic today for follow up reg her rehab needs.     She was seen in clinic on 5/15/18 for evaluation of multiple years of progressive slowing of her gait, along with lower extremity weakness and pain, referral by Dr. Davenport. She was seen in our clinic previously for similar concerns, by Dr. Yanez in 2014 and Dr. Perry in 2015, at which point her symptoms seem to have been attributed to osteoarthritis of the knees and deconditioning.     Background from initial consult note,   She is most bothered by her slowed gait. She reports that over multiple years, her walking has become progressively more slow. She additionally describes pain and weakness of both legs. The pain is a \"soreness\" throughout the entirety of both legs, which is worse with exertion such as walking or especially climbing stairs. It seems to her like a muscular rather than a joint pain. She does also have some midline low back pain that comes and goes, which she feels is likely \"arthritic.\" She denies any shooting pains down her legs, or any burning, numbness/tingling, or loss of sensation. Regarding her balance, she states that she \"has to work to stay upright,\" but she denies any dizziness, lightheadedness, or sensation of spinning. She recounts her fall several months ago in 12/2017, in which she \"reached for the door handle and missed\" and ended up falling and hitting her head (see notes from ED visit at that time for further details). Head CT was obtained which was negative.     Functionally, patient lives alone in her home and performs house and yard work with little difficulty, though she notes that she sometimes has trouble getting up off the ground if out in the yard gardening. She attends a \"strength training\" exercise class twice weekly, which she started doing about a month ago. Several years ago, she was doing a " half hour of exercise 5 days a week, including a good deal of walking, but more recently she has stopped walking and feels she has been less active. She does continue to complete her ADLs and go out with her friends regularly. She notes that she takes tylenol most days which helps her significantly. As far as other therapies go, she did attend a few sessions of PT in 2014 (when referred by Dr. Yanez) but has not done any specific therapy since then.    Patient does endorse some weight loss, perhaps 5 pounds, in recent months, but she denies other constitutional symptoms including fevers, chills, and night sweats. She has no numbness/tingling or loss of sensation, and no loss of bowel or bladder function (with the exception of some occasion stress incontinence, losing a dribble of urine when she coughs). She has no history of malignancy.       Interval history since last visit 7/10/18,  No ER visit or hospitalization.   No falls   Finished PT and now goes to the pool at [facility] x2-3/week along with some strengthening classes once every 2 weeks at the    Symptoms are slightly worse; no focal weakness or paresthesia. Takes tylenol to help with pain at knees and bilateral lower extremities; 650mg ER x2 in the morning and 325mg x1 in the afternoon. Her appetite is unchanged but she has lost another 3 lbs since her visit in May.     Social history is unchanged,   Marital Status: ,  passed away 1 year ago due to Parkinson's  Living situation: Lives alone in a house with yard  Family support: Has no children, but has many friends whom she sees regularly  Vocational History: Was formerly a  at [employer], now retired      Medications:  Current Outpatient Medications   Medication Sig Dispense Refill     aspirin 81 MG chewable tablet Take 81 mg by mouth daily.       Calcium Carbonate (CALCIUM 600 PO) Take  by mouth daily.       fish oil-omega-3 fatty acids (FISH OIL) 1000 MG capsule Take 2 g by mouth daily.        Glucosamine 500 MG CAPS Take  by mouth daily.       lisinopril (PRINIVIL/ZESTRIL) 10 MG tablet Take 1 tablet (10 mg) by mouth daily 90 tablet 3     Multiple Vitamin (MULTI-VITAMIN) per tablet Take 1 tablet by mouth daily.       simvastatin (ZOCOR) 20 MG tablet Take 1 tablet (20 mg) by mouth daily 90 tablet 0              Physical Exam:   /70 (BP Location: Left arm, Patient Position: Sitting, Cuff Size: Adult Regular)   Pulse 71   Temp 97.8  F (36.6  C) (Oral)   Resp 18   Wt 67.7 kg (149 lb 4.8 oz)   SpO2 95%   BMI 24.28 kg/m    Gen: NAD, pleasant and cooperative   Cardio: regular pulse  Pulm: non-labored breathing in room air  Abd: benign  Ext: WWP, no edema in BLE, no tenderness in calves  Neuro/MSK:   Inspection: obvious b/l genu valgus deformity; no evidence of scoliosis, symmetric positioning of bilateral shoulders and iliac crests     Palpation: nTTP throughout thoracic to lumbar paraspinals, ASISes, PSISes, GTs, ITs, piriformis muscles     ROM: full functional painless ROM of bilateral hips, ankles, and lumbar spine. Knees with limited ROM and associated with pain    Strength: Bilateral HF, KE, KF, DF, EHL, PF, inversion and eversion 5/5; able to stand/walk on toes and heels.    Sensation: Intact to light touch bilaterally along L3, L4, L5, S1, S2, except for diminished sensation at lateral side of her L foot (new since last visit). Absent vibration at great toes b/l and diminished at ankles > knees b/l. Intact proprioception at great toes b/l.    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    The etiology of her symptoms is not clear. The differential is quite broad but may include cervical myelopathy, a central impairment in balance (brain vs middle ear vs both), secondary to possible neuropathy, PAD, or simply a mechanical effect of the valgus deformity of her knees seen on exam. Laboratory workup by her PCP has included CK, TSH, urine MMA for B12 deficiency, all of which have been normal. EMG of bilateral lower extremities 7/27/18 was normal.       1. Work-up: none today  2. Therapy/equipment/braces: continue HEP, and regular exercises   3. Medications: ok to continue tylenol (total dose ~ 1800mg per day)  4. Interventions: none   5. Referrals: recommended referral to neurology clinic before for more evaluation of any underlying neurological disorder; she wanted to finish therapies and do EMG first. Agreed with the plan today and a referral was sent.   6. Follow up: in 4-6 months.       Ariadne Tom MD  Physical Medicine & Rehabilitation

## 2019-02-28 NOTE — LETTER
"2/28/2019       RE: Lynnette Mata  [address]  [place] [address]     Dear Colleague,    Thank you for referring your patient, Lynnette Mata, to the [facility] PHYSICAL MEDICINE AND REHABILITATION at [facility]. Please see a copy of my visit note below.    [facility]   PM&R clinic note        Interval history:   Lynnette Mata presents to clinic today for follow up reg her rehab needs.     She was seen in clinic on 5/15/18 for evaluation of multiple years of progressive slowing of her gait, along with lower extremity weakness and pain, referral by Dr. Davenport. She was seen in our clinic previously for similar concerns, by Dr. Yanez in 2014 and Dr. Perry in 2015, at which point her symptoms seem to have been attributed to osteoarthritis of the knees and deconditioning.     Background from initial consult note,   She is most bothered by her slowed gait. She reports that over multiple years, her walking has become progressively more slow. She additionally describes pain and weakness of both legs. The pain is a \"soreness\" throughout the entirety of both legs, which is worse with exertion such as walking or especially climbing stairs. It seems to her like a muscular rather than a joint pain. She does also have some midline low back pain that comes and goes, which she feels is likely \"arthritic.\" She denies any shooting pains down her legs, or any burning, numbness/tingling, or loss of sensation. Regarding her balance, she states that she \"has to work to stay upright,\" but she denies any dizziness, lightheadedness, or sensation of spinning. She recounts her fall several months ago in 12/2017, in which she \"reached for the door handle and missed\" and ended up falling and hitting her head (see notes from ED visit at that time for further details). Head CT was obtained which was negative.     Functionally, patient lives alone in " "her home and performs house and yard work with little difficulty, though she notes that she sometimes has trouble getting up off the ground if out in the yard gardening. She attends a \"strength training\" exercise class twice weekly, which she started doing about a month ago. Several years ago, she was doing a half hour of exercise 5 days a week, including a good deal of walking, but more recently she has stopped walking and feels she has been less active. She does continue to complete her ADLs and go out with her friends regularly. She notes that she takes tylenol most days which helps her significantly. As far as other therapies go, she did attend a few sessions of PT in 2014 (when referred by Dr. Yanez) but has not done any specific therapy since then.    Patient does endorse some weight loss, perhaps 5 pounds, in recent months, but she denies other constitutional symptoms including fevers, chills, and night sweats. She has no numbness/tingling or loss of sensation, and no loss of bowel or bladder function (with the exception of some occasion stress incontinence, losing a dribble of urine when she coughs). She has no history of malignancy.     Interval history since last visit 7/10/18,  No ER visit or hospitalization.   No falls   Finished PT and now goes to the pool at [facility] x2-3/week along with some strengthening classes once every 2 weeks at the    Symptoms are slightly worse; no focal weakness or paresthesia. Takes tylenol to help with pain at knees and bilateral lower extremities; 650mg ER x2 in the morning and 325mg x1 in the afternoon. Her appetite is unchanged but she has lost another 3 lbs since her visit in May.     Social history is unchanged,   Marital Status: ,  passed away 1 year ago due to Parkinson's  Living situation: Lives alone in a house with yard  Family support: Has no children, but has many friends whom she sees regularly  Vocational History: Was formerly a  at " [employer], now retired      Medications:  Current Outpatient Medications   Medication Sig Dispense Refill     aspirin 81 MG chewable tablet Take 81 mg by mouth daily.       Calcium Carbonate (CALCIUM 600 PO) Take  by mouth daily.       fish oil-omega-3 fatty acids (FISH OIL) 1000 MG capsule Take 2 g by mouth daily.       Glucosamine 500 MG CAPS Take  by mouth daily.       lisinopril (PRINIVIL/ZESTRIL) 10 MG tablet Take 1 tablet (10 mg) by mouth daily 90 tablet 3     Multiple Vitamin (MULTI-VITAMIN) per tablet Take 1 tablet by mouth daily.       simvastatin (ZOCOR) 20 MG tablet Take 1 tablet (20 mg) by mouth daily 90 tablet 0              Physical Exam:   /70 (BP Location: Left arm, Patient Position: Sitting, Cuff Size: Adult Regular)   Pulse 71   Temp 97.8  F (36.6  C) (Oral)   Resp 18   Wt 67.7 kg (149 lb 4.8 oz)   SpO2 95%   BMI 24.28 kg/m     Gen: NAD, pleasant and cooperative   Cardio: regular pulse  Pulm: non-labored breathing in room air  Abd: benign  Ext: WWP, no edema in BLE, no tenderness in calves  Neuro/MSK:   Inspection: obvious b/l genu valgus deformity; no evidence of scoliosis, symmetric positioning of bilateral shoulders and iliac crests     Palpation: nTTP throughout thoracic to lumbar paraspinals, ASISes, PSISes, GTs, ITs, piriformis muscles     ROM: full functional painless ROM of bilateral hips, ankles, and lumbar spine. Knees with limited ROM and associated with pain    Strength: Bilateral HF, KE, KF, DF, EHL, PF, inversion and eversion 5/5; able to stand/walk on toes and heels.    Sensation: Intact to light touch bilaterally along L3, L4, L5, S1, S2, except for diminished sensation at lateral side of her L foot (new since last visit). Absent vibration at great toes b/l and diminished at ankles > knees b/l. Intact proprioception at great toes b/l.    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    The etiology of her symptoms is not clear. The differential is quite broad but may include cervical myelopathy, a central impairment in balance (brain vs middle ear vs both), secondary to possible neuropathy, PAD, or simply a mechanical effect of the valgus deformity of her knees seen on exam. Laboratory workup by her PCP has included CK, TSH, urine MMA for B12 deficiency, all of which have been normal. EMG of bilateral lower extremities 7/27/18 was normal.       1. Work-up: none today  2. Therapy/equipment/braces: continue HEP, and regular exercises   3. Medications: ok to continue tylenol (total dose ~ 1800mg per day)  4. Interventions: none   5. Referrals: recommended referral to neurology clinic before for more evaluation of any underlying neurological disorder; she wanted to finish therapies and do EMG first. Agreed with the plan today and a referral was sent.   6. Follow up: in 4-6 months.     Ariadne Tom MD  Physical Medicine & Rehabilitation

## 2019-02-28 NOTE — NURSING NOTE
Chief Complaint   Patient presents with     RECHECK     [facility] RETURN - 6 MONTH - LOWER EXTREMITY WEAKNESS     Serena Campbell

## 2019-03-14 ENCOUNTER — OFFICE VISIT (OUTPATIENT)
Dept: NEUROLOGY | Facility: CLINIC | Age: 84
End: 2019-03-14
Attending: PHYSICAL MEDICINE & REHABILITATION
Payer: MEDICARE

## 2019-03-14 VITALS
WEIGHT: 150 LBS | BODY MASS INDEX: 24.4 KG/M2 | DIASTOLIC BLOOD PRESSURE: 68 MMHG | SYSTOLIC BLOOD PRESSURE: 112 MMHG | OXYGEN SATURATION: 98 % | HEART RATE: 71 BPM

## 2019-03-14 DIAGNOSIS — R29.818 NEUROGENIC CLAUDICATION: Primary | ICD-10-CM

## 2019-03-14 ASSESSMENT — PAIN SCALES - GENERAL: PAINLEVEL: NO PAIN (0)

## 2019-03-14 NOTE — NURSING NOTE
Chief Complaint   Patient presents with     RECHECK     impairment of balance       Chely Cortes MA

## 2019-03-14 NOTE — LETTER
"3/14/2019       RE: Lynnette Mata  [address]  [place] [address]     Dear Colleague,    Thank you for referring your patient, Lynnette Mata, to the [facility] NEUROLOGY at [facility]. Please see a copy of my visit note below.    Service Date: 03/14/2019      INTERVAL HISTORY:  This patient is an 83-year-old right-handed woman seen at the request of Dr. Tom for neurologic consultation with complaints of difficulty walking.  She says that it is \"hard to walk fast.\"  This has been a problem for the last couple of years.  Her walking is slow and painful.  Both legs hurt her when she walks.  The right leg has more weakness than the left leg and maybe more painful.  The left ankle has more of the numbness and tingling.  She does complain of aching in her low back.  She says it is easier for her to sit and bend forward than it is to sit up straight.  She says that she has lost 4 inches in height over the last 6 years.  She does not walk much at all.  She is able to carry out her activities of daily living, but uses a cane.  When she goes shopping, she relies on the shopping cart to push to enable her to finish her errands.  If she had to walk through a store without a cart it would be a challenge for her.  She does do water exercise on a regular basis and that goes well.  She has a difficult time on stairs.  She fell down a year ago at the [email or web address].  It sounds like she misstepped trying to hold on to the banister.  In any case, she fell backwards and hit her head.  She was evaluated including a CT scan of the brain that was unremarkable aside from age-related change.  Often she will walk with a cane and that gives her a little extra assurance.  She says that her legs \"have bearable pain.\"  She does not have problems with bowel or bladder control.  She does not have symptoms with regard to vision, hearing, speech or swallowing.      PAST MEDICAL HISTORY:  " Significant for high blood pressure and elevated cholesterol.  She does not have diabetes, thyroid or asthma.  She has not had pertinent surgery to the head, neck or back.  She did have a head injury in 12/2017 as noted above.  She does not drink or smoke.      SOCIAL HISTORY:  She is a , without children.  She is a retired  from [employer].      FAMILY HISTORY:  Positive for diabetes.      PHYSICAL EXAMINATION:   GENERAL:  The patient is cooperative and in no distress.   VITAL SIGNS:  Her blood pressure is 112/68.     There are no carotid bruits.  Auscultation of the heart shows S1, S2, without murmur, rub or gallop.   NEUROLOGIC:  The patient is alert, oriented and lucid.  Cranial nerve testing shows full visual fields to confrontation.  Funduscopic exam shows a fleeting image of a sharp disk on the right, but I could not adequately see it on the left.  Eye movements are complete and conjugate without nystagmus.  Pupils react to light.  Facies are symmetric.  Facial sensation is normal.  Palate elevates in the midline.  Tongue protrudes in the midline.  Motor evaluation shows no pronator drift, normal finger, finger-nose-finger and heel-knee-shin.  Examination of strength does not show any specific weakness in the arms, hands, legs or feet.  Muscle stretch reflexes are normal and symmetric in the arms, brisk at the knees, normal at the right ankle and trace at the left ankle.  Toes are downgoing.  Sensory exam shows preserved vibration and temperature, including in the feet.  Romberg sign is absent.  When she stands, she has valgus deformity and her feet are wider apart than the knees.  She walks with a waddling stiff-legged kind of gait but does stand up fairly straight while walking.  She can get up on her heels and toes, holding onto me for balance.  She does have a good pulse of the left dorsalis pedis.       ASSESSMENT:  Neurogenic claudication.      DISCUSSION:  This patient is seen for  evaluation of difficulty walking.  She tends to walk slowly, her back and legs bother her.  Leaning forward seems to help.  This combination of symptoms suggests neurogenic claudication related to lumbar spinal stenosis.  Her strength is fairly well preserved, although there is a low amplitude left ankle reflex.  I discussed with the patient what further workup would entail, including MRI imaging of the lumbar spine and electrodiagnostic testing.  The purpose of such testing would be to determine whether or not she would be a surgical candidate for spinal stenosis.  She has no interest in pursuing such a workup at this time.  She is going to monitor her neurologic status.  I will see her in followup on an as-needed basis.        Jose Francisco Matamoros MD      cc:   Ariadne Tom MD   [address] [address]      D: 2019   T: 2019   MT: AKA      Name:     RENU MENDES   MRN:      [id number]        Account:      [id number]   :      1935           Service Date: 2019      Document: [id number]

## 2019-03-14 NOTE — PROGRESS NOTES
"Service Date: 03/14/2019      INTERVAL HISTORY:  This patient is an 83-year-old right-handed woman seen at the request of Dr. Tom for neurologic consultation with complaints of difficulty walking.  She says that it is \"hard to walk fast.\"  This has been a problem for the last couple of years.  Her walking is slow and painful.  Both legs hurt her when she walks.  The right leg has more weakness than the left leg and maybe more painful.  The left ankle has more of the numbness and tingling.  She does complain of aching in her low back.  She says it is easier for her to sit and bend forward than it is to sit up straight.  She says that she has lost 4 inches in height over the last 6 years.  She does not walk much at all.  She is able to carry out her activities of daily living, but uses a cane.  When she goes shopping, she relies on the shopping cart to push to enable her to finish her errands.  If she had to walk through a store without a cart it would be a challenge for her.  She does do water exercise on a regular basis and that goes well.  She has a difficult time on stairs.  She fell down a year ago at the [employer].  It sounds like she misstepped trying to hold on to the banister.  In any case, she fell backwards and hit her head.  She was evaluated including a CT scan of the brain that was unremarkable aside from age-related change.  Often she will walk with a cane and that gives her a little extra assurance.  She says that her legs \"have bearable pain.\"  She does not have problems with bowel or bladder control.  She does not have symptoms with regard to vision, hearing, speech or swallowing.      PAST MEDICAL HISTORY:  Significant for high blood pressure and elevated cholesterol.  She does not have diabetes, thyroid or asthma.  She has not had pertinent surgery to the head, neck or back.  She did have a head injury in 12/2017 as noted above.  She does not drink or smoke.      SOCIAL HISTORY:  She is a " , without children.  She is a retired  from [employer].      FAMILY HISTORY:  Positive for diabetes.      PHYSICAL EXAMINATION:   GENERAL:  The patient is cooperative and in no distress.   VITAL SIGNS:  Her blood pressure is 112/68.     There are no carotid bruits.  Auscultation of the heart shows S1, S2, without murmur, rub or gallop.   NEUROLOGIC:  The patient is alert, oriented and lucid.  Cranial nerve testing shows full visual fields to confrontation.  Funduscopic exam shows a fleeting image of a sharp disk on the right, but I could not adequately see it on the left.  Eye movements are complete and conjugate without nystagmus.  Pupils react to light.  Facies are symmetric.  Facial sensation is normal.  Palate elevates in the midline.  Tongue protrudes in the midline.  Motor evaluation shows no pronator drift, normal finger, finger-nose-finger and heel-knee-shin.  Examination of strength does not show any specific weakness in the arms, hands, legs or feet.  Muscle stretch reflexes are normal and symmetric in the arms, brisk at the knees, normal at the right ankle and trace at the left ankle.  Toes are downgoing.  Sensory exam shows preserved vibration and temperature, including in the feet.  Romberg sign is absent.  When she stands, she has valgus deformity and her feet are wider apart than the knees.  She walks with a waddling stiff-legged kind of gait but does stand up fairly straight while walking.  She can get up on her heels and toes, holding onto me for balance.  She does have a good pulse of the left dorsalis pedis.       ASSESSMENT:  Neurogenic claudication.      DISCUSSION:  This patient is seen for evaluation of difficulty walking.  She tends to walk slowly, her back and legs bother her.  Leaning forward seems to help.  This combination of symptoms suggests neurogenic claudication related to lumbar spinal stenosis.  Her strength is fairly well preserved, although there is a low amplitude  left ankle reflex.  I discussed with the patient what further workup would entail, including MRI imaging of the lumbar spine and electrodiagnostic testing.  The purpose of such testing would be to determine whether or not she would be a surgical candidate for spinal stenosis.  She has no interest in pursuing such a workup at this time.  She is going to monitor her neurologic status.  I will see her in followup on an as-needed basis.        José Matamoros MD      cc:   Ariadne Tom MD   [address] [address]         JOSÉ MATAMOROS MD             D: 2019   T: 2019   MT: AKA      Name:     RENU MENDES   MRN:      [id number]        Account:      [id number]   :      1935           Service Date: 2019      Document: [id number]

## 2019-04-15 DIAGNOSIS — R07.89 ATYPICAL CHEST PAIN: ICD-10-CM

## 2019-04-15 DIAGNOSIS — E78.01 FAMILIAL HYPERCHOLESTEROLEMIA: ICD-10-CM

## 2019-04-17 RX ORDER — SIMVASTATIN 20 MG
20 TABLET ORAL DAILY
Qty: 90 TABLET | Refills: 0 | Status: SHIPPED | OUTPATIENT
Start: 2019-04-17 | End: 2019-07-13

## 2019-04-17 NOTE — TELEPHONE ENCOUNTER
simvastatin (ZOCOR) 20 MG tablet     Last Written Prescription Date:  10/22/18  Last Fill Quantity: 90,   # refills: 0  Last Office Visit :10/22/18  Future Office visit:  None    90 day to pharmacy    Routing  Because:  LDL

## 2019-04-19 NOTE — TELEPHONE ENCOUNTER
[facility] Call Center    Phone Message    May a detailed message be left on voicemail: yes    Reason for Call: Medication Refill Request    Has the patient contacted the pharmacy for the refill? Yes   Name of medication being requested: simvastatin (ZOCOR) 20 MG tablet    Provider who prescribed the medication: Dr. Davenport   Pharmacy: [facility]   Date medication is needed: asap          Action Taken: Message routed to:  [facility]

## 2019-05-28 ENCOUNTER — TELEPHONE (OUTPATIENT)
Dept: PHYSICAL MEDICINE AND REHAB | Facility: CLINIC | Age: 84
End: 2019-05-28

## 2019-05-28 NOTE — TELEPHONE ENCOUNTER
[facility] Call Center    Phone Message    May a detailed message be left on voicemail: yes    Reason for Call: Other: pt called in and reported that she needed to cancel her 5/30/19 appt with provider, Ariadne Tom.  Pt needed to cancel this appt because she will be moving to a new residence during that date and she cannot make the appt. Pt reported that she will call back to schedule another appt after her move.  I cancelled this appt but I should have sent an encounter and then have your department cancel it. Sending this encounter as a FYI that pt will not make the 5/30/19 appt.  Thank you. [place], [facility] Call , 5/28/19, 7:50am.     Action Taken: Message routed to:  [facility]

## 2019-06-24 ENCOUNTER — TELEPHONE (OUTPATIENT)
Dept: INTERNAL MEDICINE | Facility: CLINIC | Age: 84
End: 2019-06-24

## 2019-06-24 NOTE — TELEPHONE ENCOUNTER
[facility]    Phone Message    May a detailed message be left on voicemail: yes    Reason for Call: Symptoms or Concerns     If patient has red-flag symptoms, warm transfer to triage line    Current symptom or concern: vaginal pain    Symptoms have been present for:  4 day(s)    Has patient previously been seen for this? No    By : n/a    Date: n/a    Are there any new or worsening symptoms? Yes: frequency is increasing      Action Taken: Message routed to:  [facility]

## 2019-06-24 NOTE — TELEPHONE ENCOUNTER
Chest pain and back pain, patient states this went on  for 30 min last Thursday. Patient states that she does not notice triggers for symptoms, although she got this pain when she bent over to  a light-medium weight box. Patient is taking aspirin 81 mg daily. Patient described the episode as tightening of chest and back and then needs to sit and rest and let it pass. Can last short while up to 60 min. Patient states she has only had two episodes. Patient states she used to get these, and a work up was done, and they stopped and now they are back. Scheduled patient for an appointment. Advised patient that if symptoms increase or worsen to go to ER or call 911. Patient states verbal understanding. Love Saab LPN 6/24/2019 10:01 AM

## 2019-06-25 NOTE — PROGRESS NOTES
"[facility]  [facility] [place]   Gutierrez Gamboa MD  2019      Chief Complaint:   Chest Pain       History of Present Illness:   Lynnette Mata is a 83 year old female with a history of HTN and osteoarthritis who presents for evaluation of chest pain.     Chest pain: She believes that she may have had chest pain episodes for several years but did not have it evaluated until 2016 when she had another episode. At that she had a EKG, stress test and CT (results below). 6 days ago she had the chest pain again but it was much longer then her previous episode lasting about 30 minutes. During the episodes the pain radiates between her left chest and shoulder and was so significant that she thought about calling EMS. She sat alone and did not move because she was scared to aggravate the pain.   3 days ago she was moving boxes and started to feel the pain again but it was significantly less describing it as \"a different feeling\". She sat for an hour without movement but the pain did not alleviate immediately. She does have a history of hypertension and hyperlipidemia. Her mother  of MI at 74 and grandmother  of a stoke at 72. She denies a hx of GERD, lung issues, diabetes, blood clots or smoking. She also denies shortness of breath, nausea, fainting, pain with breathing, leg swelling or recent travel. She did not try to use Tums after when the pain started. She recently moved into an assisted living home that does not have AC so she has been sweating more then before.     Her legs are weak as she uses a cane to walk long distances.      Review of Systems:   Pertinent items are noted in HPI or as in patient entered ROS below, remainder of complete ROS is negative.     Active Medications:      aspirin 81 MG chewable tablet, Take 81 mg by mouth daily., Disp: , Rfl:      Calcium Carbonate (CALCIUM 600 PO), Take  by mouth daily., Disp: , Rfl:      fish oil-omega-3 fatty acids (FISH OIL) 1000 MG capsule, " Take 2 g by mouth daily., Disp: , Rfl:      Glucosamine 500 MG CAPS, Take  by mouth daily., Disp: , Rfl:      lisinopril (PRINIVIL/ZESTRIL) 10 MG tablet, Take 1 tablet (10 mg) by mouth daily, Disp: 90 tablet, Rfl: 3     Multiple Vitamin (MULTI-VITAMIN) per tablet, Take 1 tablet by mouth daily., Disp: , Rfl:      simvastatin (ZOCOR) 20 MG tablet, Take 1 tablet (20 mg) by mouth daily, Disp: 90 tablet, Rfl: 0      Allergies:   Nkda [no known drug allergies]      Past Medical History:  Hypertension   Osteoarthritis   Stricture and stenosis of cervix   Chronic constipation   Advance care planning      Past Surgical History:  No pertinent surgical history     Family History:   Mother: MI @74   Father: suicide @49, CAD   Brother: diabetes mellitus type 1, bipolar disorder, CAD  Maternal grandmother: cerebrovascular disease, stroke @72   Maternal grandfather: diabetes mellitus type 2 @65       Social History:   The patient was alone   Smoking Status: never   Smokeless Tobacco: never    Alcohol Use: yes; normally 1 drink per week, at most 3 drinks per week        Physical Exam:   /69   Pulse 83   Wt 65.6 kg (144 lb 9.6 oz)   SpO2 94%   Breastfeeding? No   BMI 23.52 kg/m     Constitutional: Alert. In no distress.  Head: Normocephalic.   Cardiovascular: RRR. No murmurs, clicks, gallops, or rub.  Respiratory: Clear to auscultation bilaterally, no wheezes or crackles.  Psychiatric: Mentation appears normal. Normal affect.     EKG 11/18/16   Vent. rate 68 BPM  OK interval 212 ms  QRS duration 96 ms  QT/QTc 420/446 ms  P-R-T axes 4 -41 20  Sinus rhythm with 1st degree A-V block  Left axis deviation  Septal infarct ,age undetermined    Echocardiography Laboratory 11/29/2016   Interpretation Summary  Indeterminant dobutamine stress echocardiogram.  No wall motion abnormalities at rest and with infusion of dobutamine.  Normal LV size and function. The LVEF is 55-60% at rest and increases  appropriately with dobutamine  infusion to approximately 70-75%.  Normal blood pressure response to dobutamine infusion.  Significant 2 mm horizontal ST depression in the inferior leads at a HR of  114 bpm, persisting up to 12 minutes into recovery before normalizing. No  assiciated chest pain or echocardiograpohic abnormalities.  No chest pain at rest and with infusion of dobutamine.  No significant valvular disease noted on routine screening color flow Doppler  and pulsed Doppler examination. The ascending aortic segment is normal.  Consider cardiology consultation.    CT angiogram 12/20/2016   IMPRESSION:  1.  Normal coronary arteries with no CAD.   2.  Total Agatston score 0 placing the patient in the lowest  percentile when compared to age and gender matched control group.  3.  Please review Radiology report for incidental noncardiac findings  that will follow separately.     Assessment and Plan:  Chest pain, unspecified type  Her EKG shows no change from previous.   Chest pain intermittently over the past years being first evaluated in 2016. EKG showed sinus rhythm with 1st degree AV block and left axis deviation but stress test and CT were unremarkable. 6 days ago she had another episode, the first since her last, but lasting significantly longer. EKG today showed no changes from previous. I do not believe this angina but we will get a new stress test, X-ray and labs completed as she has a family history of MI and personal history of hypertension and hyperlipidemia. Even if everything is normal I will have her visit cardiology for further evaluation. Discussed that some patients experience an esophageal spasm with similar symptoms but it is hard to determine as she would need to have episode at time of evaluation.   - CBC with platelets differential  - Comprehensive metabolic panel  - Troponin I  - EKG Performed in Clinic w/ Provider Reading Fee  - XR Chest 2 Views  - Echocardiogram Dobutamine Stress  - CARDIOLOGY EVAL ADULT REFERRAL      Follow-up: No follow-ups on file.          Scribe Disclosure:  I, Mary Le, am serving as a scribe to document services personally performed by Gutierrez Gamboa MD at this visit, based upon the provider's statements to me. All documentation has been reviewed by the aforementioned provider prior to being entered into the official medical record.     Portions of this medical record were completed by a scribe. UPON MY REVIEW AND AUTHENTICATION BY ELECTRONIC SIGNATURE, this confirms (a) I performed the applicable clinical services, and (b) the record is accurate.   Gutierrez Gamboa MD

## 2019-06-26 ENCOUNTER — OFFICE VISIT (OUTPATIENT)
Dept: FAMILY MEDICINE | Facility: CLINIC | Age: 84
End: 2019-06-26
Payer: MEDICARE

## 2019-06-26 ENCOUNTER — ANCILLARY PROCEDURE (OUTPATIENT)
Dept: GENERAL RADIOLOGY | Facility: CLINIC | Age: 84
End: 2019-06-26
Attending: FAMILY MEDICINE
Payer: MEDICARE

## 2019-06-26 VITALS
BODY MASS INDEX: 23.52 KG/M2 | WEIGHT: 144.6 LBS | HEART RATE: 83 BPM | OXYGEN SATURATION: 94 % | DIASTOLIC BLOOD PRESSURE: 69 MMHG | SYSTOLIC BLOOD PRESSURE: 139 MMHG

## 2019-06-26 DIAGNOSIS — R07.9 CHEST PAIN, UNSPECIFIED TYPE: ICD-10-CM

## 2019-06-26 DIAGNOSIS — E78.01 FAMILIAL HYPERCHOLESTEROLEMIA: ICD-10-CM

## 2019-06-26 DIAGNOSIS — R07.9 CHEST PAIN, UNSPECIFIED TYPE: Primary | ICD-10-CM

## 2019-06-26 DIAGNOSIS — R07.89 ATYPICAL CHEST PAIN: ICD-10-CM

## 2019-06-26 LAB
ALBUMIN SERPL-MCNC: 3.6 G/DL (ref 3.4–5)
ALP SERPL-CCNC: 37 U/L (ref 40–150)
ALT SERPL W P-5'-P-CCNC: 32 U/L (ref 0–50)
ANION GAP SERPL CALCULATED.3IONS-SCNC: 6 MMOL/L (ref 3–14)
AST SERPL W P-5'-P-CCNC: 20 U/L (ref 0–45)
BASOPHILS # BLD AUTO: 0 10E9/L (ref 0–0.2)
BASOPHILS NFR BLD AUTO: 0.5 %
BILIRUB SERPL-MCNC: 0.4 MG/DL (ref 0.2–1.3)
BUN SERPL-MCNC: 26 MG/DL (ref 7–30)
CALCIUM SERPL-MCNC: 9.6 MG/DL (ref 8.5–10.1)
CHLORIDE SERPL-SCNC: 105 MMOL/L (ref 94–109)
CHOLEST SERPL-MCNC: 172 MG/DL
CO2 SERPL-SCNC: 27 MMOL/L (ref 20–32)
CREAT SERPL-MCNC: 0.74 MG/DL (ref 0.52–1.04)
DIFFERENTIAL METHOD BLD: NORMAL
EOSINOPHIL # BLD AUTO: 0.1 10E9/L (ref 0–0.7)
EOSINOPHIL NFR BLD AUTO: 1.2 %
ERYTHROCYTE [DISTWIDTH] IN BLOOD BY AUTOMATED COUNT: 12.5 % (ref 10–15)
GFR SERPL CREATININE-BSD FRML MDRD: 75 ML/MIN/{1.73_M2}
GLUCOSE SERPL-MCNC: 99 MG/DL (ref 70–99)
HCT VFR BLD AUTO: 42.4 % (ref 35–47)
HDLC SERPL-MCNC: 89 MG/DL
HGB BLD-MCNC: 14.3 G/DL (ref 11.7–15.7)
IMM GRANULOCYTES # BLD: 0 10E9/L (ref 0–0.4)
IMM GRANULOCYTES NFR BLD: 0.3 %
LDLC SERPL CALC-MCNC: 64 MG/DL
LYMPHOCYTES # BLD AUTO: 1.6 10E9/L (ref 0.8–5.3)
LYMPHOCYTES NFR BLD AUTO: 25.9 %
MCH RBC QN AUTO: 32.4 PG (ref 26.5–33)
MCHC RBC AUTO-ENTMCNC: 33.7 G/DL (ref 31.5–36.5)
MCV RBC AUTO: 96 FL (ref 78–100)
MONOCYTES # BLD AUTO: 0.6 10E9/L (ref 0–1.3)
MONOCYTES NFR BLD AUTO: 10.4 %
NEUTROPHILS # BLD AUTO: 3.8 10E9/L (ref 1.6–8.3)
NEUTROPHILS NFR BLD AUTO: 61.7 %
NONHDLC SERPL-MCNC: 83 MG/DL
NRBC # BLD AUTO: 0 10*3/UL
NRBC BLD AUTO-RTO: 0 /100
PLATELET # BLD AUTO: 311 10E9/L (ref 150–450)
POTASSIUM SERPL-SCNC: 4.2 MMOL/L (ref 3.4–5.3)
PROT SERPL-MCNC: 7.2 G/DL (ref 6.8–8.8)
RBC # BLD AUTO: 4.42 10E12/L (ref 3.8–5.2)
SODIUM SERPL-SCNC: 138 MMOL/L (ref 133–144)
TRIGL SERPL-MCNC: 97 MG/DL
TROPONIN I SERPL-MCNC: <0.015 UG/L (ref 0–0.04)
WBC # BLD AUTO: 6.1 10E9/L (ref 4–11)

## 2019-06-26 ASSESSMENT — PAIN SCALES - GENERAL: PAINLEVEL: NO PAIN (0)

## 2019-06-26 NOTE — TELEPHONE ENCOUNTER
RECORDS RECEIVED FROM: Internal   DATE RECEIVED: 6-29   NOTES STATUS DETAILS   OFFICE NOTE from referring provider    Internal    OFFICE NOTE from other cardiologist    Internal    DISCHARGE SUMMARY from hospital    N/A    DISCHARGE REPORT from the ER   N/A    OPERATIVE REPORT    N/A    MEDICATION LIST   Internal    LABS     BMP   Internal    CBC   Internal 6-26-19   CMP   Internal 6-26-19   Lipids   Internal 6-26-19   TSH   Internal 8-3-18   DIAGNOSTIC PROCEDURES     EKG   Internal 2017   Monitor Reports   N/A    IMAGING (DISC & REPORT)      Echo   In process Scheduled for 6-27   Stress Tests   In process Scheduled for 6-27   Cath   N/A    MRI/MRA   N/A    CT/CTA   N/A

## 2019-06-26 NOTE — PATIENT INSTRUCTIONS
Primary Care Center Phone Number [phone]  [facility] Medication Refill Request Information:  * Please contact your pharmacy regarding ANY request for medication refills.  ** [facility] Prescription Fax = [phone]  * Please allow 3 business days for routine medication refills.  * Please allow 5 business days for controlled substance medication refills.     [facility] Test Result notification information:  *You will be notified with in 7-10 days of your appointment day regarding the results of your test.  If you are on MyChart you will be notified as soon as the provider has reviewed the results and signed off on them.        [facility] [phone] ([facility] [place] 4th [place])     Please go to the lab and x-ray on the first floor before you leave today.     Cardiovascular [phone] ([facility] [place] 3rd [place] S)     ECHO [phone] ([facility] [place] 3rd [place] S)

## 2019-06-26 NOTE — NURSING NOTE
"Chief Complaint   Patient presents with     Chest Pain     Pt states she is here for angina and back pain, experienced last Thursday for about 30 minutes. Denies any Sx today.       Vital signs:      BP: 139/69 Pulse: 83     SpO2: 94 %       Weight: 65.6 kg (144 lb 9.6 oz)  Estimated body mass index is 23.52 kg/m  as calculated from the following:    Height as of 8/2/18: 1.67 m (5' 5.75\").    Weight as of this encounter: 65.6 kg (144 lb 9.6 oz).      SMA Laura at 8:46 AM on 6/26/2019  "

## 2019-06-27 ENCOUNTER — HOSPITAL ENCOUNTER (OUTPATIENT)
Dept: CARDIOLOGY | Facility: CLINIC | Age: 84
Discharge: HOME | End: 2019-06-27
Attending: FAMILY MEDICINE | Admitting: FAMILY MEDICINE
Payer: MEDICARE

## 2019-06-27 ENCOUNTER — OFFICE VISIT (OUTPATIENT)
Dept: PHYSICAL MEDICINE AND REHAB | Facility: CLINIC | Age: 84
End: 2019-06-27
Payer: MEDICARE

## 2019-06-27 VITALS
BODY MASS INDEX: 23.52 KG/M2 | HEART RATE: 79 BPM | OXYGEN SATURATION: 95 % | SYSTOLIC BLOOD PRESSURE: 129 MMHG | WEIGHT: 144.62 LBS | DIASTOLIC BLOOD PRESSURE: 66 MMHG

## 2019-06-27 DIAGNOSIS — M25.561 CHRONIC PAIN OF BOTH KNEES: ICD-10-CM

## 2019-06-27 DIAGNOSIS — R07.9 CHEST PAIN, UNSPECIFIED TYPE: ICD-10-CM

## 2019-06-27 DIAGNOSIS — M21.062 ACQUIRED GENU VALGUM OF BOTH KNEES: ICD-10-CM

## 2019-06-27 DIAGNOSIS — M21.061 ACQUIRED GENU VALGUM OF BOTH KNEES: ICD-10-CM

## 2019-06-27 DIAGNOSIS — M25.562 CHRONIC PAIN OF BOTH KNEES: ICD-10-CM

## 2019-06-27 DIAGNOSIS — R29.818 NEUROGENIC CLAUDICATION: Primary | ICD-10-CM

## 2019-06-27 DIAGNOSIS — G89.29 CHRONIC PAIN OF BOTH KNEES: ICD-10-CM

## 2019-06-27 LAB — INTERPRETATION ECG - MUSE: NORMAL

## 2019-06-27 PROCEDURE — 25500064 ZZH RX 255 OP 636: Performed by: INTERNAL MEDICINE

## 2019-06-27 PROCEDURE — 93325 DOPPLER ECHO COLOR FLOW MAPG: CPT | Mod: 26 | Performed by: INTERNAL MEDICINE

## 2019-06-27 PROCEDURE — 93350 STRESS TTE ONLY: CPT | Mod: 26 | Performed by: INTERNAL MEDICINE

## 2019-06-27 PROCEDURE — 25000128 H RX IP 250 OP 636: Performed by: INTERNAL MEDICINE

## 2019-06-27 PROCEDURE — 93321 DOPPLER ECHO F-UP/LMTD STD: CPT | Mod: 26 | Performed by: INTERNAL MEDICINE

## 2019-06-27 PROCEDURE — 40000264 ECHO STRESS ECHOCARDIOGRAM

## 2019-06-27 PROCEDURE — 25000125 ZZHC RX 250: Performed by: INTERNAL MEDICINE

## 2019-06-27 PROCEDURE — 93018 CV STRESS TEST I&R ONLY: CPT | Performed by: INTERNAL MEDICINE

## 2019-06-27 PROCEDURE — 93016 CV STRESS TEST SUPVJ ONLY: CPT | Performed by: INTERNAL MEDICINE

## 2019-06-27 RX ORDER — METOPROLOL TARTRATE 1 MG/ML
1-20 INJECTION, SOLUTION INTRAVENOUS
Status: ACTIVE | OUTPATIENT
Start: 2019-06-27 | End: 2019-06-27

## 2019-06-27 RX ORDER — SODIUM CHLORIDE 9 MG/ML
INJECTION, SOLUTION INTRAVENOUS CONTINUOUS
Status: ACTIVE | OUTPATIENT
Start: 2019-06-27 | End: 2019-06-27

## 2019-06-27 RX ORDER — DOBUTAMINE HYDROCHLORIDE 200 MG/100ML
10-50 INJECTION INTRAVENOUS CONTINUOUS
Status: ACTIVE | OUTPATIENT
Start: 2019-06-27 | End: 2019-06-27

## 2019-06-27 RX ORDER — ATROPINE SULFATE 0.4 MG/ML
.2-2 AMPUL (ML) INJECTION
Status: DISCONTINUED | OUTPATIENT
Start: 2019-06-27 | End: 2019-06-28 | Stop reason: HOSPADM

## 2019-06-27 RX ADMIN — PERFLUTREN 15 ML: 6.52 INJECTION, SUSPENSION INTRAVENOUS at 09:06

## 2019-06-27 RX ADMIN — METOPROLOL TARTRATE 2 MG: 5 INJECTION INTRAVENOUS at 09:02

## 2019-06-27 RX ADMIN — DOBUTAMINE HYDROCHLORIDE 10 MCG/KG/MIN: 200 INJECTION INTRAVENOUS at 08:49

## 2019-06-27 ASSESSMENT — PAIN SCALES - GENERAL: PAINLEVEL: NO PAIN (0)

## 2019-06-27 NOTE — LETTER
"6/27/2019       RE: Lynnette Mata  [address]  [place] [address]     Dear Colleague,    Thank you for referring your patient, Lynnette Mata, to the [facility] PHYSICAL MEDICINE AND REHABILITATION at [facility]. Please see a copy of my visit note below.    [facility]   PM&R clinic note      Interval history:   Lynnette Mata presents to clinic today for follow up reg her rehab needs.     She was seen in clinic on 5/15/18 for evaluation of multiple years of progressive slowing of her gait, along with lower extremity weakness and pain, referral by Dr. Davenport. She was seen in our clinic previously for similar concerns, by Dr. Yanez in 2014 and Dr. Perry in 2015, at which point her symptoms seem to have been attributed to osteoarthritis of the knees and deconditioning.     Background from initial consult note,   She is most bothered by her slowed gait. She reports that over multiple years, her walking has become progressively more slow. She additionally describes pain and weakness of both legs. The pain is a \"soreness\" throughout the entirety of both legs, which is worse with exertion such as walking or especially climbing stairs. It seems to her like a muscular rather than a joint pain. She does also have some midline low back pain that comes and goes, which she feels is likely \"arthritic.\" She denies any shooting pains down her legs, or any burning, numbness/tingling, or loss of sensation. Regarding her balance, she states that she \"has to work to stay upright,\" but she denies any dizziness, lightheadedness, or sensation of spinning. She recounts her fall several months ago in 12/2017, in which she \"reached for the door handle and missed\" and ended up falling and hitting her head (see notes from ED visit at that time for further details). Head CT was obtained which was negative.     Functionally, patient lives " "alone in her home and performs house and yard work with little difficulty, though she notes that she sometimes has trouble getting up off the ground if out in the yard gardening. She attends a \"strength training\" exercise class twice weekly, which she started doing about a month ago. Several years ago, she was doing a half hour of exercise 5 days a week, including a good deal of walking, but more recently she has stopped walking and feels she has been less active. She does continue to complete her ADLs and go out with her friends regularly. She notes that she takes tylenol most days which helps her significantly. As far as other therapies go, she did attend a few sessions of PT in 2014 (when referred by Dr. Yanez) but has not done any specific therapy since then.    Patient does endorse some weight loss, perhaps 5 pounds, in recent months, but she denies other constitutional symptoms including fevers, chills, and night sweats. She has no numbness/tingling or loss of sensation, and no loss of bowel or bladder function (with the exception of some occasion stress incontinence, losing a dribble of urine when she coughs). She has no history of malignancy.     Interval history since last visit 2/28/19,  Was seen at primary care clinic for chest pain. EKG showed no changes. CXR, TTE and cardiology referral were ordered.     Saw Dr. Matamoros in neurology clinic on 3/14/19. Her symptoms were felt to be secondary to neurogenic claudication. Per Dr. Matamoros's note, \"I discussed with the patient what further workup would entail, including MRI imaging of the lumbar spine and electrodiagnostic testing.  The purpose of such testing would be to determine whether or not she would be a surgical candidate for spinal stenosis.  She has no interest in pursuing such a workup at this time.  She is going to monitor her neurologic status.  I will see her in followup on an as-needed basis. \"    She moved to [facility] and is " doing well. Limited exercise over the past 2 months; prior to that was going to the pool at [employer] x2-3/week along with some strengthening classes once every 2 weeks at the  . Takes tylenol to help with pain at knees and bilateral lower extremities; 650mg ER x2 in the morning and 325mg x1 in the afternoon.     Her pain and other symptoms have been unchanged; no falls. No new weakness or paresthesia; no issues with bowel or bladder control.     Social history is unchanged,   Marital Status: ,  passed away 1 year ago due to Parkinson's  Living situation: Lives alone in an independent living apartment   Family support: Has no children, but has many friends whom she sees regularly  Vocational History: Was formerly a  at [employer], now retired      Medications:  Current Outpatient Medications   Medication Sig Dispense Refill     aspirin 81 MG chewable tablet Take 81 mg by mouth daily.       Calcium Carbonate (CALCIUM 600 PO) Take  by mouth daily.       fish oil-omega-3 fatty acids (FISH OIL) 1000 MG capsule Take 2 g by mouth daily.       Glucosamine 500 MG CAPS Take  by mouth daily.       lisinopril (PRINIVIL/ZESTRIL) 10 MG tablet Take 1 tablet (10 mg) by mouth daily 90 tablet 3     Multiple Vitamin (MULTI-VITAMIN) per tablet Take 1 tablet by mouth daily.       simvastatin (ZOCOR) 20 MG tablet Take 1 tablet (20 mg) by mouth daily 90 tablet 0          Physical Exam:   /66   Pulse 79   Wt 65.6 kg (144 lb 10 oz)   SpO2 95%   BMI 23.52 kg/m     Gen: NAD, pleasant and cooperative   Cardio: regular pulse  Pulm: non-labored breathing in room air  Abd: benign  Ext: WWP, no edema in BLE, no tenderness in calves  Neuro/MSK:   Inspection: obvious b/l genu valgus deformity; no evidence of scoliosis, symmetric positioning of bilateral shoulders and iliac crests     Palpation: nTTP throughout thoracic to lumbar paraspinals, ASISes, PSISes, GTs, ITs, piriformis muscles     ROM: full functional painless  ROM of bilateral hips, ankles, and lumbar spine. Knees with limited ROM and associated with pain    Strength: Bilateral HF, KE, KF, DF, EHL, PF, inversion and eversion 5/5; able to stand/walk on toes and heels.    Sensation: Intact to light touch bilaterally along L3, L4, L5, S1, S2, except for diminished sensation at lateral side of her L foot (new since last visit). Absent vibration at great toes b/l and diminished at ankles > knees b/l. Intact proprioception at great toes b/l.    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    EMG of bilateral lower extremities 7/27/18 was normal. More work-up including repeat EMG and L spine MRI was recommended by Dr. Matamoros, as well as referral to neurosurgery team pending the results. She was not interested at that point. Reviewed her symptoms, possible etiologies and the plan. Recommended PT to review her HEP and work on gait and balance. She will call Dr. Matamoros whenever she wants to pursue his recommendations.     1. Work-up: none today.  2. Therapy/equipment/braces: continue HEP, and regular exercises. She will call us if she wants to start PT.   3. Medications: ok to continue tylenol (total dose ~ 1800mg per day)  4. Interventions: none   5. Referrals: none today.   6. Follow up: as needed in the future.     Ariadne Tom MD  Physical Medicine & Rehabilitation

## 2019-06-27 NOTE — PROGRESS NOTES
Pt here for dobutamine stress test.  Test, meds and side effects reviewed with patient.  Achieved target HR at 30 mcg Dobutamine.  No atropine was used.  Patient did have ST depression with stress which did not resolve in recovery and after HR has returned to baseline.  Attending cardiologist Dr CLAUDIA Parks is consulted with who has seen the patient and given his consent to discharge the patient and return for a coronary CT angiogram on an outpatient basis.  Gave a total of 2 mg IV Metoprolol to bring HR back to baseline.  ECG changes have resolved at 10 minutes post stress.  Post monitoring complete and VSS.  Pt escorted out to the gold waiting room.

## 2019-06-27 NOTE — PROGRESS NOTES
"[facility]   PM&R clinic note        Interval history:   Lynnette Mata presents to clinic today for follow up reg her rehab needs.     She was seen in clinic on 5/15/18 for evaluation of multiple years of progressive slowing of her gait, along with lower extremity weakness and pain, referral by Dr. Davenport. She was seen in our clinic previously for similar concerns, by Dr. Yanez in 2014 and Dr. Perry in 2015, at which point her symptoms seem to have been attributed to osteoarthritis of the knees and deconditioning.     Background from initial consult note,   She is most bothered by her slowed gait. She reports that over multiple years, her walking has become progressively more slow. She additionally describes pain and weakness of both legs. The pain is a \"soreness\" throughout the entirety of both legs, which is worse with exertion such as walking or especially climbing stairs. It seems to her like a muscular rather than a joint pain. She does also have some midline low back pain that comes and goes, which she feels is likely \"arthritic.\" She denies any shooting pains down her legs, or any burning, numbness/tingling, or loss of sensation. Regarding her balance, she states that she \"has to work to stay upright,\" but she denies any dizziness, lightheadedness, or sensation of spinning. She recounts her fall several months ago in 12/2017, in which she \"reached for the door handle and missed\" and ended up falling and hitting her head (see notes from ED visit at that time for further details). Head CT was obtained which was negative.     Functionally, patient lives alone in her home and performs house and yard work with little difficulty, though she notes that she sometimes has trouble getting up off the ground if out in the yard gardening. She attends a \"strength training\" exercise class twice weekly, which she started doing about a month ago. Several years ago, she was doing a " "half hour of exercise 5 days a week, including a good deal of walking, but more recently she has stopped walking and feels she has been less active. She does continue to complete her ADLs and go out with her friends regularly. She notes that she takes tylenol most days which helps her significantly. As far as other therapies go, she did attend a few sessions of PT in 2014 (when referred by Dr. Yanez) but has not done any specific therapy since then.    Patient does endorse some weight loss, perhaps 5 pounds, in recent months, but she denies other constitutional symptoms including fevers, chills, and night sweats. She has no numbness/tingling or loss of sensation, and no loss of bowel or bladder function (with the exception of some occasion stress incontinence, losing a dribble of urine when she coughs). She has no history of malignancy.       Interval history since last visit 2/28/19,  Was seen at primary care clinic for chest pain. EKG showed no changes. CXR, TTE and cardiology referral were ordered.     Saw Dr. Matamoros in neurology clinic on 3/14/19. Her symptoms were felt to be secondary to neurogenic claudication. Per Dr. Matamoros's note, \"I discussed with the patient what further workup would entail, including MRI imaging of the lumbar spine and electrodiagnostic testing.  The purpose of such testing would be to determine whether or not she would be a surgical candidate for spinal stenosis.  She has no interest in pursuing such a workup at this time.  She is going to monitor her neurologic status.  I will see her in followup on an as-needed basis. \"      She moved to [facility] and is doing well. Limited exercise over the past 2 months; prior to that was going to the pool at [facility] x2-3/week along with some strengthening classes once every 2 weeks at the  . Takes tylenol to help with pain at knees and bilateral lower extremities; 650mg ER x2 in the morning and 325mg x1 in the afternoon.     Her " pain and other symptoms have been unchanged; no falls. No new weakness or paresthesia; no issues with bowel or bladder control.         Social history is unchanged,   Marital Status: ,  passed away 1 year ago due to Parkinson's  Living situation: Lives alone in an independent living apartment   Family support: Has no children, but has many friends whom she sees regularly  Vocational History: Was formerly a  at [employer], now retired      Medications:  Current Outpatient Medications   Medication Sig Dispense Refill     aspirin 81 MG chewable tablet Take 81 mg by mouth daily.       Calcium Carbonate (CALCIUM 600 PO) Take  by mouth daily.       fish oil-omega-3 fatty acids (FISH OIL) 1000 MG capsule Take 2 g by mouth daily.       Glucosamine 500 MG CAPS Take  by mouth daily.       lisinopril (PRINIVIL/ZESTRIL) 10 MG tablet Take 1 tablet (10 mg) by mouth daily 90 tablet 3     Multiple Vitamin (MULTI-VITAMIN) per tablet Take 1 tablet by mouth daily.       simvastatin (ZOCOR) 20 MG tablet Take 1 tablet (20 mg) by mouth daily 90 tablet 0              Physical Exam:   /66   Pulse 79   Wt 65.6 kg (144 lb 10 oz)   SpO2 95%   BMI 23.52 kg/m    Gen: NAD, pleasant and cooperative   Cardio: regular pulse  Pulm: non-labored breathing in room air  Abd: benign  Ext: WWP, no edema in BLE, no tenderness in calves  Neuro/MSK:   Inspection: obvious b/l genu valgus deformity; no evidence of scoliosis, symmetric positioning of bilateral shoulders and iliac crests     Palpation: nTTP throughout thoracic to lumbar paraspinals, ASISes, PSISes, GTs, ITs, piriformis muscles     ROM: full functional painless ROM of bilateral hips, ankles, and lumbar spine. Knees with limited ROM and associated with pain    Strength: Bilateral HF, KE, KF, DF, EHL, PF, inversion and eversion 5/5; able to stand/walk on toes and heels.    Sensation: Intact to light touch bilaterally along L3, L4, L5, S1, S2, except for diminished  sensation at lateral side of her L foot (new since last visit). Absent vibration at great toes b/l and diminished at ankles > knees b/l. Intact proprioception at great toes b/l.    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    EMG of bilateral lower extremities 7/27/18 was normal. More work-up including repeat EMG and L spine MRI was recommended by Dr. Matamoros, as well as referral to neurosurgery team pending the results. She was not interested at that point. Reviewed her symptoms, possible etiologies and the plan. Recommended PT to review her HEP and work on gait and balance. She will call Dr. Matamoros whenever she wants to pursue his recommendations.       1. Work-up: none today.  2. Therapy/equipment/braces: continue HEP, and regular exercises. She will call us if she wants to start PT.   3. Medications: ok to continue tylenol (total dose ~ 1800mg per day)  4. Interventions: none   5. Referrals: none today.   6. Follow up: as needed in the future.       Ariadne Tom MD  Physical Medicine & Rehabilitation

## 2019-06-29 ENCOUNTER — OFFICE VISIT (OUTPATIENT)
Dept: CARDIOLOGY | Facility: CLINIC | Age: 84
End: 2019-06-29
Attending: INTERNAL MEDICINE
Payer: MEDICARE

## 2019-06-29 ENCOUNTER — PRE VISIT (OUTPATIENT)
Dept: CARDIOLOGY | Facility: CLINIC | Age: 84
End: 2019-06-29

## 2019-06-29 VITALS
OXYGEN SATURATION: 96 % | HEIGHT: 65 IN | SYSTOLIC BLOOD PRESSURE: 121 MMHG | WEIGHT: 145 LBS | DIASTOLIC BLOOD PRESSURE: 67 MMHG | BODY MASS INDEX: 24.16 KG/M2 | HEART RATE: 94 BPM

## 2019-06-29 DIAGNOSIS — R07.9 CHEST PAIN, UNSPECIFIED TYPE: ICD-10-CM

## 2019-06-29 PROCEDURE — 99204 OFFICE O/P NEW MOD 45 MIN: CPT | Mod: ZP | Performed by: INTERNAL MEDICINE

## 2019-06-29 PROCEDURE — G0463 HOSPITAL OUTPT CLINIC VISIT: HCPCS | Mod: ZF

## 2019-06-29 ASSESSMENT — PAIN SCALES - GENERAL: PAINLEVEL: NO PAIN (0)

## 2019-06-29 ASSESSMENT — MIFFLIN-ST. JEOR: SCORE: 1113.6

## 2019-06-29 NOTE — PATIENT INSTRUCTIONS
Patient Instructions:  It was a pleasure to see you in the cardiology clinic today.      If you have any questions, you can reach my nurse, Kye Dahl, at [phone].  Press Option #1 for the [facility], and then press Option #4 for nursing.    We are encouraging the use of [employer] to communicate with your HealthCare Provider    Medication Changes: None    Studies Ordered: None    The results from today include: None    Please follow up: With Dr. Alejandre as needed.    Sincerely,    PAULINE Alejandre MD     If you have an urgent need after hours (8:00 am to 4:30 pm) please call [phone] and ask for the cardiology fellow on call.

## 2019-06-29 NOTE — NURSING NOTE
Chief Complaint   Patient presents with     New Patient     new - referral from primary     Vitals were taken and medications were reconciled.     Thania Wilkinson MA    10:03 AM

## 2019-06-29 NOTE — PROGRESS NOTES
SUBJECTIVE:  Lynnette Mata is a 83 year old female who presents for evaluation of chest pain.    Patient has a history of hypertension and hyperlipidemia.  No other cardiac risk factors.  Last evaluated in 2016 for chest pain.    As per patient she had a 2 episodes of chest pain similar to the one in 2016.  She do not remember what she was doing but she had chest tightness lasting for over 30 minutes.  There was no radiation.  No aggravating or relieving factors.  No radiation.  Had no associated symptoms of diaphoresis nausea or vomiting.  Pain subsided by itself though she was contemplating going to the emergency room.    Recently patient sold her home.  Moved to [facility] in [place].  As patient was moving the boxes she says she had chest pain 2 to 3 x 10 but this was not bothering her.  Pain subsided by itself.    No exertional symptoms.  Otherwise patient is fairly active and asymptomatic.  Patient used to do water aerobics.  This was stopped recently for unclear reasons.  Planning to restart this program now.    Patient Active Problem List    Diagnosis Date Noted     Essential hypertension, benign 01/30/2012     Priority: High     Neurogenic claudication 03/14/2019     Priority: Medium     Abnormal cardiovascular stress test 12/02/2016     Priority: Medium     ACP (advance care planning) 10/10/2016     Priority: Medium     Advance Care Planning 10/10/2016: Receipt of ACP document:  Received: Health Care Directive which was witnessed or notarized on 7-3-16.  Document previously scanned on 8-4-16.  Validation form completed and sent to be scanned.  Code Status needs to be updated to reflect choices in most recent ACP document.Recommend goals of care discussion with patient/decision makers and POLST/ updated Code status order as applicable to reflect patient choices. .  Confirmed/documented designated decision maker(s).  Added by Mami Gtz RN Advance Care Planning Liaison with [employer]               Encounter for routine gynecological examination 03/02/2016     Priority: Medium     No further Pap screening indicated secondary to age >65. ksl           Primary osteoarthritis of both knees 09/30/2015     Priority: Medium     Knee pain, bilateral 06/12/2012     Priority: Medium     Post-menopause 01/30/2012     Priority: Medium     Genital atrophy of female 01/30/2012     Priority: Medium     Cervical stenosis (uterine cervix) 01/30/2012     Priority: Low    .  Current Outpatient Medications   Medication Sig     aspirin 81 MG chewable tablet Take 81 mg by mouth daily.     Calcium Carbonate (CALCIUM 600 PO) Take  by mouth daily.     fish oil-omega-3 fatty acids (FISH OIL) 1000 MG capsule Take 2 g by mouth daily.     Glucosamine 500 MG CAPS Take  by mouth daily.     lisinopril (PRINIVIL/ZESTRIL) 10 MG tablet Take 1 tablet (10 mg) by mouth daily     Multiple Vitamin (MULTI-VITAMIN) per tablet Take 1 tablet by mouth daily.     simvastatin (ZOCOR) 20 MG tablet Take 1 tablet (20 mg) by mouth daily     No current facility-administered medications for this visit.      Past Medical History:   Diagnosis Date     Asymptomatic menopausal state age 45    Hx HRT use x 6+ years--off with  study findings     Shingles outbreak 2/14/2013    Despite immunization at age 72!        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[employer]- , taaught childrens literature   Social Needs     Financial resource  strain: Not on file     Food insecurity:     Worry: Not on file     Inability: Not on file     Transportation needs:     Medical: Not on file     Non-medical: Not on file   Tobacco Use     Smoking status: Never Smoker     Smokeless tobacco: Never Used   Substance and Sexual Activity     Alcohol use: Yes     Alcohol/week: 0.0 oz     Comment: 1 d; <3x week / week MAX,      Drug use: No     Sexual activity: Never     Partners: Male     Birth control/protection: Post-menopausal     Comment:  age 45   Lifestyle     Physical activity:     Days per week: Not on file     Minutes per session: Not on file     Stress: Not on file   Relationships     Social connections:     Talks on phone: Not on file     Gets together: Not on file     Attends Yazidism service: Not on file     Active member of club or organization: Not on file     Attends meetings of clubs or organizations: Not on file     Relationship status: Not on file     Intimate partner violence:     Fear of current or ex partner: Not on file     Emotionally abused: Not on file     Physically abused: Not on file     Forced sexual activity: Not on file   Other Topics Concern      Service No     Blood Transfusions No     Caffeine Concern No     Occupational Exposure No     Hobby Hazards No     Sleep Concern No     Stress Concern No     Weight Concern No     Special Diet No     Back Care No     Exercise No     Comment: Walks 3x weeks; 30 minutes     Bike Helmet No     Seat Belt No     Self-Exams Not Asked   Social History Narrative    How much exercise per week? Half hour everyday    How much calcium per day? Supplement       How much caffeine per day? 1 cup daily    How much vitamin D per day? MVI    Do you/your family wear seatbelts?  Yes    Do you/your family use safety helmets? No    Do you/your family use sunscreen? No    Do you/your family keep firearms in the home? No    Do you/your family have a smoke detector(s)? Yes        Do you feel safe in your home?  "Yes    Has anyone ever touched you in an unwanted manner? No     Explain     SEE GILA [facility]    2/3/2014        Reviewed John KIM [facility]05/01/2017    Reviewed John KIM [facility] 8/2/2018         Family History   Problem Relation Age of Onset     Myocardial Infarction Mother 74     Cerebrovascular Disease Maternal Grandmother 74     Cancer - colorectal Maternal Aunt 68     Endocrine Disease Brother 10        Type I Diabetes Mellitus     Psychotic Disorder Brother         bipolar     C.A.D. Brother      C.A.D. Father      Psychotic Disorder Father 49        suicide     Diabetes Maternal Grandfather 65        DMII     Parkinsonism Other                   REVIEW OF SYSTEMS:  General: negative, fever, chills, night sweats  Skin: negative, acne, rash and scaling  Eyes: negative, double vision, eye pain and photophobia  Ears/Nose/Throat: negative, nasal congestion and purulent rhinorrhea  Respiratory: No dyspnea on exertion, No cough, No hemoptysis and negative  Cardiovascular: negative, palpitations, tachycardia, irregular heart beat, exertional chest pain or pressure and paroxysmal nocturnal dyspnea  Gastrointestinal: negative, dysphagia, nausea and vomiting  Genitourinary: negative, nocturia, dysuria and frequency  Musculoskeletal: negative, fracture, back pain and neck pain  Neurologic: negative, headaches, syncope, stroke, seizures and paralysis  Psychiatric: negative, nervous breakdown, thoughts of self-harm and thoughts of hurting someone else  Hematologic/Lymphatic/Immunologic: negative, bleeding disorder, chills and fever  Endocrine: negative, cold intolerance, heat intolerance and hot flashes       OBJECTIVE:  Blood pressure 121/67, pulse 94, height 1.651 m (5' 5\"), weight 65.8 kg (145 lb), SpO2 96 %, not currently breastfeeding.  General Appearance: healthy, alert, active and no distress  Head: Normocephalic. No masses, lesions, tenderness or abnormalities  Eyes: conjuctiva clear, PERRL, EOM intact  Ears: External " ears normal. Canals clear. TM's normal.  Nose: Nares normal  Mouth: normal  Neck: Supple, no cervical adenopathy, no thyromegaly  Lungs: clear to auscultation  Cardiac: regular rate and rhythm, normal S1 and S2, no murmur  Abdomen: Soft, nontender.  Normal bowel sounds.  No hepatosplenomegaly or abnormal masses  Extremities: no peripheral edema, peripheral pulses normal  Musculoskeletal: negative  Neurological: Cranial nerves 2-12 intact, motor strength intact       ASSESSMENT/PLAN:  Patient here for evaluation of 2 episodes of chest pain.  First episode was chest heaviness lasting around 35 to 40 minutes.  No associated symptoms subsided by itself.  This was addressed and had no effect on exertion or activity.  Patient's cardiac risk factors include hypertension, hyperlipidemia and age.  Patient had similar chest pain in 2016.  She was evaluated at that time.  She had a normal coronary CT angiogram at that time with the 0 calcium score.  This is surprisingly rare in an 81-year-old female.  For this chest pain she had an EKG which was reviewed and it is normal.  She had a dobutamine stress echo.  This was normal.  EKG showed 1 mm ST depression which is nondiagnostic in a female patient.    Overall no evidence for significant CAD.  Noncardiac chest pain.  Patient reassured.  Noncardiac chest pain no further evaluation or treatment needed.  Patient is advised to continue her water aerobics and increase the activity level.  Advised to continue current medications at the current dose.  Per orders.   Return to Clinic PRN.

## 2019-06-29 NOTE — LETTER
6/29/2019      RE: Lynnette Mata  [address]  [facility] [address]       Dear Colleague,    Thank you for the opportunity to participate in the care of your patient, Lynnette Mata, at the [facility]. Please see a copy of my visit note below.       SUBJECTIVE:  Lynnette Mata is a 83 year old female who presents for evaluation of chest pain.    Patient has a history of hypertension and hyperlipidemia.  No other cardiac risk factors.  Last evaluated in 2016 for chest pain.    As per patient she had a 2 episodes of chest pain similar to the one in 2016.  She do not remember what she was doing but she had chest tightness lasting for over 30 minutes.  There was no radiation.  No aggravating or relieving factors.  No radiation.  Had no associated symptoms of diaphoresis nausea or vomiting.  Pain subsided by itself though she was contemplating going to the emergency room.    Recently patient sold her home.  Moved to [facility] in [place].  As patient was moving the boxes she says she had chest pain 2 to 3 x 10 but this was not bothering her.  Pain subsided by itself.    No exertional symptoms.  Otherwise patient is fairly active and asymptomatic.  Patient used to do water aerobics.  This was stopped recently for unclear reasons.  Planning to restart this program now.    Patient Active Problem List    Diagnosis Date Noted     Essential hypertension, benign 01/30/2012     Priority: High     Neurogenic claudication 03/14/2019     Priority: Medium     Abnormal cardiovascular stress test 12/02/2016     Priority: Medium     ACP (advance care planning) 10/10/2016     Priority: Medium     Advance Care Planning 10/10/2016: Receipt of ACP document:  Received: Health Care Directive which was witnessed or notarized on 7-3-16.  Document previously scanned on 8-4-16.  Validation form completed and sent to be scanned.  Code Status needs to be  updated to reflect choices in most recent ACP document.Recommend goals of care discussion with patient/decision makers and POLST/ updated Code status order as applicable to reflect patient choices. .  Confirmed/documented designated decision maker(s).  Added by Mami Gtz RN Advance Care Planning Liaison with [facility]               Encounter for routine gynecological examination 03/02/2016     Priority: Medium     No further Pap screening indicated secondary to age >65. ksl           Primary osteoarthritis of both knees 09/30/2015     Priority: Medium     Knee pain, bilateral 06/12/2012     Priority: Medium     Post-menopause 01/30/2012     Priority: Medium     Genital atrophy of female 01/30/2012     Priority: Medium     Cervical stenosis (uterine cervix) 01/30/2012     Priority: Low    .  Current Outpatient Medications   Medication Sig     aspirin 81 MG chewable tablet Take 81 mg by mouth daily.     Calcium Carbonate (CALCIUM 600 PO) Take  by mouth daily.     fish oil-omega-3 fatty acids (FISH OIL) 1000 MG capsule Take 2 g by mouth daily.     Glucosamine 500 MG CAPS Take  by mouth daily.     lisinopril (PRINIVIL/ZESTRIL) 10 MG tablet Take 1 tablet (10 mg) by mouth daily     Multiple Vitamin (MULTI-VITAMIN) per tablet Take 1 tablet by mouth daily.     simvastatin (ZOCOR) 20 MG tablet Take 1 tablet (20 mg) by mouth daily     No current facility-administered medications for this visit.      Past Medical History:   Diagnosis Date     Asymptomatic menopausal state age 45    Hx HRT use x 6+ years--off with  study findings     Shingles outbreak 2/14/2013    Despite immunization at age 72!        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[employer]- [employer]   Social Needs     Financial resource strain: Not on file     Food insecurity:     Worry: Not on file     Inability: Not on file     Transportation needs:     Medical: Not on file     Non-medical: Not on file   Tobacco Use     Smoking status: Never Smoker     Smokeless tobacco: Never Used   Substance and Sexual Activity     Alcohol use: Yes     Alcohol/week: 0.0 oz     Comment: 1 d; <3x week / week MAX,      Drug use: No     Sexual activity: Never     Partners: Male     Birth control/protection: Post-menopausal     Comment:  age 45   Lifestyle     Physical activity:     Days per week: Not on file     Minutes per session: Not on file     Stress: Not on file   Relationships     Social connections:     Talks on phone: Not on file     Gets together: Not on file     Attends [employer] service: Not on file     Active member of club or organization: Not on file     Attends meetings of clubs or organizations: Not on file     Relationship status: Not on file     Intimate partner violence:     Fear of current or ex partner: Not on file     Emotionally abused: Not on file     Physically abused: Not on file     Forced sexual activity: Not on file   Other Topics Concern      Service No     Blood Transfusions No     Caffeine Concern No     Occupational Exposure No     Hobby Hazards No     Sleep Concern No     Stress Concern No     Weight Concern No     Special Diet No     Back Care No     Exercise No     Comment: Walks 3x weeks; 30 minutes     Bike Helmet No     Seat Belt No     Self-Exams Not Asked   Social History Narrative    How much exercise per week? Half hour everyday    How much calcium per day? Supplement       How much caffeine per  day? 1 cup daily    How much vitamin D per day? MVI    Do you/your family wear seatbelts?  Yes    Do you/your family use safety helmets? No    Do you/your family use sunscreen? No    Do you/your family keep firearms in the home? No    Do you/your family have a smoke detector(s)? Yes        Do you feel safe in your home? Yes    Has anyone ever touched you in an unwanted manner? No     Explain     SEE ARYAN URIOSTEGUI    2/3/2014        Reviewed John KIM [facility]05/01/2017    Reviewed John KIM [facility] 8/2/2018         Family History   Problem Relation Age of Onset     Myocardial Infarction Mother 74     Cerebrovascular Disease Maternal Grandmother 74     Cancer - colorectal Maternal Aunt 68     Endocrine Disease Brother 10        Type I Diabetes Mellitus     Psychotic Disorder Brother         bipolar     C.A.D. Brother      C.A.D. Father      Psychotic Disorder Father 49        suicide     Diabetes Maternal Grandfather 65        DMII     Parkinsonism Other                   REVIEW OF SYSTEMS:  General: negative, fever, chills, night sweats  Skin: negative, acne, rash and scaling  Eyes: negative, double vision, eye pain and photophobia  Ears/Nose/Throat: negative, nasal congestion and purulent rhinorrhea  Respiratory: No dyspnea on exertion, No cough, No hemoptysis and negative  Cardiovascular: negative, palpitations, tachycardia, irregular heart beat, exertional chest pain or pressure and paroxysmal nocturnal dyspnea  Gastrointestinal: negative, dysphagia, nausea and vomiting  Genitourinary: negative, nocturia, dysuria and frequency  Musculoskeletal: negative, fracture, back pain and neck pain  Neurologic: negative, headaches, syncope, stroke, seizures and paralysis  Psychiatric: negative, nervous breakdown, thoughts of self-harm and thoughts of hurting someone else  Hematologic/Lymphatic/Immunologic: negative, bleeding disorder, chills and fever  Endocrine: negative, cold intolerance, heat intolerance and hot flashes        "OBJECTIVE:  Blood pressure 121/67, pulse 94, height 1.651 m (5' 5\"), weight 65.8 kg (145 lb), SpO2 96 %, not currently breastfeeding.  General Appearance: healthy, alert, active and no distress  Head: Normocephalic. No masses, lesions, tenderness or abnormalities  Eyes: conjuctiva clear, PERRL, EOM intact  Ears: External ears normal. Canals clear. TM's normal.  Nose: Nares normal  Mouth: normal  Neck: Supple, no cervical adenopathy, no thyromegaly  Lungs: clear to auscultation  Cardiac: regular rate and rhythm, normal S1 and S2, no murmur  Abdomen: Soft, nontender.  Normal bowel sounds.  No hepatosplenomegaly or abnormal masses  Extremities: no peripheral edema, peripheral pulses normal  Musculoskeletal: negative  Neurological: Cranial nerves 2-12 intact, motor strength intact       ASSESSMENT/PLAN:  Patient here for evaluation of 2 episodes of chest pain.  First episode was chest heaviness lasting around 35 to 40 minutes.  No associated symptoms subsided by itself.  This was addressed and had no effect on exertion or activity.  Patient's cardiac risk factors include hypertension, hyperlipidemia and age.  Patient had similar chest pain in 2016.  She was evaluated at that time.  She had a normal coronary CT angiogram at that time with the 0 calcium score.  This is surprisingly rare in an 81-year-old female.  For this chest pain she had an EKG which was reviewed and it is normal.  She had a dobutamine stress echo.  This was normal.  EKG showed 1 mm ST depression which is nondiagnostic in a female patient.    Overall no evidence for significant CAD.  Noncardiac chest pain.  Patient reassured.  Noncardiac chest pain no further evaluation or treatment needed.  Patient is advised to continue her water aerobics and increase the activity level.  Advised to continue current medications at the current dose.  Per orders.   Return to Clinic PRN.    Please do not hesitate to contact me if you have any questions/concerns. "     Sincerely,     NEDA Enamorado MD

## 2019-07-11 ENCOUNTER — TELEPHONE (OUTPATIENT)
Dept: INTERNAL MEDICINE | Facility: CLINIC | Age: 84
End: 2019-07-11

## 2019-07-11 NOTE — TELEPHONE ENCOUNTER
[facility]    Phone Message    May a detailed message be left on voicemail: yes    Reason for Call: Symptoms or Concerns     If patient has red-flag symptoms, warm transfer to triage line    Current symptom or concern: not being able to sleep     Symptoms have been present for:  About a week & a half    Has patient previously been seen for this? No    By : NA    Date: NA    Are there any new or worsening symptoms? Yes: this just started wanting to know what to do about it       Action Taken: Message routed to:  [facility] JESSICA

## 2019-07-11 NOTE — TELEPHONE ENCOUNTER
Spoke to patient who states that she is having trouble getting a full nights sleep. Patient would like something to take to take over the counter to get some sleep at night. Patient was advised to schedule an appointment with a provider to discuss what she could do and/or if there was anything she could take. Patient inquired about melatonin, advised patient if she were to try this, that she should allow for a full 8 hours of sleep and if she experiences any symptoms to stop taking it immediately and call to schedule an appointment. Love Saab LPN 7/11/2019 8:56 AM

## 2019-07-13 DIAGNOSIS — E78.01 FAMILIAL HYPERCHOLESTEROLEMIA: ICD-10-CM

## 2019-07-13 DIAGNOSIS — R07.89 ATYPICAL CHEST PAIN: ICD-10-CM

## 2019-07-15 DIAGNOSIS — R07.89 ATYPICAL CHEST PAIN: ICD-10-CM

## 2019-07-15 DIAGNOSIS — E78.01 FAMILIAL HYPERCHOLESTEROLEMIA: ICD-10-CM

## 2019-07-15 NOTE — TELEPHONE ENCOUNTER
[facility] Call Center    Phone Message    May a detailed message be left on voicemail: yes    Reason for Call: Medication Question or concern regarding medication   Prescription Clarification  Name of Medication: melatonin (over the Counter)  Prescribing Provider: Nickolas Davenport MD    Pharmacy: [facility] [id number] [address]   What on the order needs clarification? The patient said this recommend it over the counter medication didn't work please call asap to address patient concerns thank you           Action Taken: Message routed to:  [facility] pcc

## 2019-07-16 ENCOUNTER — OFFICE VISIT (OUTPATIENT)
Dept: INTERNAL MEDICINE | Facility: CLINIC | Age: 84
End: 2019-07-16
Payer: MEDICARE

## 2019-07-16 VITALS
DIASTOLIC BLOOD PRESSURE: 76 MMHG | SYSTOLIC BLOOD PRESSURE: 157 MMHG | RESPIRATION RATE: 16 BRPM | BODY MASS INDEX: 23.55 KG/M2 | HEART RATE: 118 BPM | WEIGHT: 141.5 LBS

## 2019-07-16 DIAGNOSIS — F51.01 PRIMARY INSOMNIA: ICD-10-CM

## 2019-07-16 DIAGNOSIS — F51.01 PRIMARY INSOMNIA: Primary | ICD-10-CM

## 2019-07-16 RX ORDER — SIMVASTATIN 20 MG
TABLET ORAL
Qty: 90 TABLET | Refills: 0 | OUTPATIENT
Start: 2019-07-16

## 2019-07-16 RX ORDER — SIMVASTATIN 20 MG
TABLET ORAL
Qty: 90 TABLET | Refills: 0 | Status: SHIPPED | OUTPATIENT
Start: 2019-07-16 | End: 2019-10-11

## 2019-07-16 RX ORDER — TRAZODONE HYDROCHLORIDE 50 MG/1
50 TABLET, FILM COATED ORAL
Qty: 30 TABLET | Refills: 3 | Status: SHIPPED | OUTPATIENT
Start: 2019-07-16 | End: 2019-11-18

## 2019-07-16 ASSESSMENT — PAIN SCALES - GENERAL: PAINLEVEL: NO PAIN (0)

## 2019-07-16 NOTE — PATIENT INSTRUCTIONS
[facility] Medication Refill Request Information:  * Please contact your pharmacy regarding ANY request for medication refills.  ** [facility] Prescription Fax = [phone]  * Please allow 3 business days for routine medication refills.  * Please allow 5 business days for controlled substance medication refills.     [facility] Test Result notification information:  *You will be notified with in 7-10 days of your appointment day regarding the results of your test.  If you are on MyChart you will be notified as soon as the provider has reviewed the results and signed off on them.    [facility] [phone]

## 2019-07-16 NOTE — PROGRESS NOTES
"[facility]  [facility] [place]   Jyoti Sousa MD  07/16/2019      Chief Complaint:   Insomnia     History of Present Illness:   Lynnette Mata is a 83 year old female with a history of hypertension, osteoarthritis, and chronic constipation who presents for evaluation of insomnia.     Insomnia  She describes that her brother is bipolar and she had a \"touch of that\" throughout her adult life. She has never taken bipolar treatment medications as she wanted to \"tough it out on her own\". She has been losing sleep for a couple weeks and sometimes takes naps. She would like a short-term sleeping medication, as she has an biNue coming up. She tried melatonin with no lasting effect. She was recommended to take Trazodone in the past, but explains she did not due to side effects and the need to be tapered off. However, after reviewing Dr. Davenport's clinic note on 10/22/18, she \"used Trazodone for a period of time for sleep-- now doing great.\"     Cardiology   She was seen by Dr. Enamorado in Cardiology on 6/29/19 for chest pain that lasted around half an hour. She had a stress test done on 6/27 that was normal. She was concerned, as her mother and grandmother have passed away from heart issues. Furthermore, her blood pressure and heart rate were elevated today (see below). She describes that she could feel her heart beat when her blood pressure was taken. She explains that she gets her blood pressure taken around every month at [facility], where it has always been normal, and thinks her blood pressure is raised from lack of sleep.     Review of Systems:   Pertinent items are noted in HPI or as in patient entered ROS below, remainder of complete ROS is negative.     Active Medications:      aspirin 81 MG chewable tablet, Take 81 mg by mouth daily., Disp: , Rfl:      Calcium Carbonate (CALCIUM 600 PO), Take  by mouth daily., Disp: , Rfl:      fish oil-omega-3 fatty acids (FISH OIL) 1000 MG capsule, Take " 2 g by mouth daily., Disp: , Rfl:      Glucosamine 500 MG CAPS, Take  by mouth daily., Disp: , Rfl:      lisinopril (PRINIVIL/ZESTRIL) 10 MG tablet, Take 1 tablet (10 mg) by mouth daily, Disp: 90 tablet, Rfl: 3     Multiple Vitamin (MULTI-VITAMIN) per tablet, Take 1 tablet by mouth daily., Disp: , Rfl:      simvastatin (ZOCOR) 20 MG tablet, TAKE 1 TABLET(20 MG) BY MOUTH DAILY, Disp: 90 tablet, Rfl: 0     traZODone (DESYREL) 50 MG tablet, Take 1 tablet (50 mg) by mouth nightly as needed for sleep, Disp: 30 tablet, Rfl: 3      Allergies:   Nkda [no known drug allergies]      Past Medical History:  Asymptomatic menopausal state   Shingles   Stricture and stenosis of cervix  Chronic constipation   Hypertension   Osteoarthritis of bilateral knees  ACP  Neurogenic claudication      Past Surgical History:  Colonoscopy     Family History:   Mother - MI  Maternal grandmother - cerebrovascular disease    Maternal aunt - colorectal cancer   Brother - type 1 diabetes, bipolar disease, coronary artery disease   Father - coronary artery disease, suicide   Maternal grandfather - type 1 diabetes   - parkinsonism        Social History:   The patient was alone.   Smoking Status: Never smoker    Smokeless Tobacco: Never used   Alcohol Use: Yes      Physical Exam:   /76 (BP Location: Right arm, Patient Position: Sitting, Cuff Size: Adult Regular)   Pulse 118   Resp 16   Wt 64.2 kg (141 lb 8 oz)   Breastfeeding? No   BMI 23.55 kg/m       Patient Vitals for the past 24 hrs:   BP Pulse Resp Weight   07/16/19 1803 157/76 118 -- --   07/16/19 1800 166/66 106 16 64.2 kg (141 lb 8 oz)     General: Pleasant female, in no apparent distress  Resp: lungs CAB  CV: Heart regular rate and rhythm, no murmur, rub, or gallop  Neuro: Alert and oriented x 3, no focal deficits      Assessment and Plan:  Lynnette Mata is a 83 year old female with a history of hypertension, osteoarthritis, and chronic constipation who presents for  evaluation of insomnia.     Primary insomnia  Originally apprehensive to starting Trazodone. Informed her 50mg dosage does not require tapering when used PRN and side effects are generally rare. Further reviewed that she used in the past with positive effect on sleep.   - traZODone (DESYREL) 50 MG tablet  Dispense: 30 tablet; Refill: 3     Follow-up: PRN     Scribe Disclosure:  I, Juanita Dobbs, am serving as a scribe to document services personally performed by Jyoti Sousa MD at this visit, based upon the provider's statements to me. All documentation has been reviewed by the aforementioned provider prior to being entered into the official medical record.     Portions of this medical record were completed by a scribe. UPON MY REVIEW AND AUTHENTICATION BY ELECTRONIC SIGNATURE, this confirms (a) I performed the applicable clinical services, and (b) the record is accurate.     Jyoti Sousa MD

## 2019-07-16 NOTE — NURSING NOTE
Chief Complaint   Patient presents with     Insomnia       Blake Rosas CMA (AAMA) at 5:59 PM on 7/16/2019

## 2019-07-18 RX ORDER — TRAZODONE HYDROCHLORIDE 50 MG/1
TABLET, FILM COATED ORAL
Qty: 90 TABLET | Refills: 3 | OUTPATIENT
Start: 2019-07-18

## 2019-08-14 ENCOUNTER — OFFICE VISIT (OUTPATIENT)
Dept: PSYCHOLOGY | Facility: CLINIC | Age: 84
End: 2019-08-14
Payer: MEDICARE

## 2019-08-14 DIAGNOSIS — F43.23 ADJUSTMENT DISORDER WITH MIXED ANXIETY AND DEPRESSED MOOD: Primary | ICD-10-CM

## 2019-08-21 ENCOUNTER — TELEPHONE (OUTPATIENT)
Dept: INTERNAL MEDICINE | Facility: CLINIC | Age: 84
End: 2019-08-21

## 2019-08-21 NOTE — TELEPHONE ENCOUNTER
Spoke to patient who states that the tip of right index finger, has been hurting all week and today seems to have got worse. Patient was asking for something to be prescribed over the phone, as she did not want to come in for her finger. Advised patient that she would need to be seen in clinic to have her finger evaluated as we would not be able to prescribe something over the phone. Patient stated that she would watch her finger and make an appointment if she needed. Love Saab LPN 8/21/2019 3:01 PM

## 2019-08-21 NOTE — TELEPHONE ENCOUNTER
[facility]    Phone Message    May a detailed message be left on voicemail: yes    Reason for Call: Medication Question or concern regarding medication   Prescription Clarification  Name of Medication: N/a  Prescribing Provider: ITALIA RATLIFF   Pharmacy: [id number] - [place], [place] - [facility]   What on the order needs clarification? Pt states she has one swollen finger at the tip of the first. Pt states very painful. Pt states she is wanting to get a medication for her finger. Pt thinks it might be infected.           Action Taken: Message routed to:  [facility] Pcc

## 2019-08-22 ENCOUNTER — OFFICE VISIT (OUTPATIENT)
Dept: ORTHOPEDICS | Facility: CLINIC | Age: 84
End: 2019-08-22
Payer: MEDICARE

## 2019-08-22 VITALS — WEIGHT: 141 LBS | RESPIRATION RATE: 16 BRPM | BODY MASS INDEX: 23.49 KG/M2 | HEIGHT: 65 IN

## 2019-08-22 DIAGNOSIS — G89.29 CHRONIC PAIN OF BOTH KNEES: ICD-10-CM

## 2019-08-22 DIAGNOSIS — R29.818 NEUROGENIC CLAUDICATION: Primary | ICD-10-CM

## 2019-08-22 DIAGNOSIS — M21.061 ACQUIRED GENU VALGUM OF BOTH KNEES: ICD-10-CM

## 2019-08-22 DIAGNOSIS — R26.89 IMPAIRMENT OF BALANCE: ICD-10-CM

## 2019-08-22 DIAGNOSIS — Z74.09 IMPAIRED FUNCTIONAL MOBILITY, BALANCE, GAIT, AND ENDURANCE: ICD-10-CM

## 2019-08-22 DIAGNOSIS — M21.062 ACQUIRED GENU VALGUM OF BOTH KNEES: ICD-10-CM

## 2019-08-22 DIAGNOSIS — M25.562 CHRONIC PAIN OF BOTH KNEES: ICD-10-CM

## 2019-08-22 DIAGNOSIS — M25.561 CHRONIC PAIN OF BOTH KNEES: ICD-10-CM

## 2019-08-22 DIAGNOSIS — L03.011 PARONYCHIA OF FINGER OF RIGHT HAND: Primary | ICD-10-CM

## 2019-08-22 RX ORDER — CEPHALEXIN 500 MG/1
500 CAPSULE ORAL 2 TIMES DAILY
Qty: 14 CAPSULE | Refills: 0 | Status: SHIPPED | OUTPATIENT
Start: 2019-08-22 | End: 2019-12-16

## 2019-08-22 ASSESSMENT — MIFFLIN-ST. JEOR: SCORE: 1095.45

## 2019-08-22 NOTE — LETTER
2019       RE: Lynnette Mata  [address]  [facility] [address]     Dear Colleague,    Thank you for referring your patient, Lynnette Mata, to the [facility] [place] SPORTS AND ORTHOPAEDIC WALK IN CLINIC at [facility]. Please see a copy of my visit note below.          SPORTS & ORTHOPEDIC WALK-IN VISIT 2019    Primary Care Physician: Dr. Davenport    19 - Does not recall any injury.  Since Monday, swelling and discomfort have increased steadily.  Very tender by the nailbed.    Reason for visit:     What part of your body is injured / painful?  right index finger    What caused the injury /pain? No inciting event     How long ago did your injury occur or pain begin? several days ago    What are your most bothersome symptoms? Pain and Swelling    How would you characterize your symptom?  aching, dull and sharp    What makes your symptoms better? Nothing    What makes your symptoms worse? Movement/touching    Have you been previously seen for this problem? No    Medical History:    Any recent changes to your medical history? No    Any new medication prescribed since last visit? No    Have you had surgery on this body part before? No    Social History:    Occupation: retired    Handedness: Right    Exercise: None    Review of Systems:    Do you have fever, chills, weight loss? No    Do you have any vision problems? No    Do you have any chest pain or edema? No    Do you have any shortness of breath or wheezing?  No    Do you have stomach problems? No    Do you have any numbness or focal weakness? No    Do you have diabetes? No    Do you have problems with bleeding or clotting? No    Do you have an rashes or other skin lesions? No           [facility]  Orthopedics  Josh Alberto MD  2019     Name: Lynnette Mata  MRN: [id number]  Age: 83 year old  : 1935  Referring provider: Referred Self     Chief Complaint: Edema of the Right Index  "Finger   ***    Date of Injury: ***    History of Present Illness:   Lynnette Mata is a 83 year old, {RIGHT/LEFT/AMBI:434478236::\"right handed\"} female who presents today for evaluation of right index finger pain.    Started out on Monday  Called primary care  Monday this week started  Tender to touch  Has not taken any medications    Review of Systems:   A 10-point review of systems was obtained and is negative except for as noted in the HPI.     Medications:     Current Outpatient Medications:      aspirin 81 MG chewable tablet, Take 81 mg by mouth daily., Disp: , Rfl:      Calcium Carbonate (CALCIUM 600 PO), Take  by mouth daily., Disp: , Rfl:      fish oil-omega-3 fatty acids (FISH OIL) 1000 MG capsule, Take 2 g by mouth daily., Disp: , Rfl:      Glucosamine 500 MG CAPS, Take  by mouth daily., Disp: , Rfl:      lisinopril (PRINIVIL/ZESTRIL) 10 MG tablet, Take 1 tablet (10 mg) by mouth daily, Disp: 90 tablet, Rfl: 3     Multiple Vitamin (MULTI-VITAMIN) per tablet, Take 1 tablet by mouth daily., Disp: , Rfl:      simvastatin (ZOCOR) 20 MG tablet, TAKE 1 TABLET(20 MG) BY MOUTH DAILY, Disp: 90 tablet, Rfl: 0     traZODone (DESYREL) 50 MG tablet, Take 1 tablet (50 mg) by mouth nightly as needed for sleep, Disp: 30 tablet, Rfl: 3    Allergies:  Nkda [no known drug allergies]     Past Medical History:  ***  Past Medical History:   Diagnosis Date     Asymptomatic menopausal state age 45    Hx HRT use x 6+ years--off with  study findings     Shingles outbreak 2/14/2013    Despite immunization at age 72!        Stricture and stenosis of cervix      Unspecified constipation     hx chronic       Past Surgical History:  ***  Past Surgical History:   Procedure Laterality Date     COLONOSCOPY  4.15.09    (Fiberoptic) WNL -- ami in 10 yrs        Social History:  Patient lives with ***.  Works as ***. She {denies/admits to:479400} tobacco use and {denies/admits to:902894} ***alcohol use.   ***  Social History " "    Tobacco Use     Smoking status: Never Smoker     Smokeless tobacco: Never Used   Substance Use Topics     Alcohol use: Yes     Alcohol/week: 0.0 oz     Comment: 1 d; <3x week / week MAX,      Drug use: No         Family History:  ***  Family History   Problem Relation Age of Onset     Myocardial Infarction Mother 74     Cerebrovascular Disease Maternal Grandmother 74     Cancer - colorectal Maternal Aunt 68     Endocrine Disease Brother 10        Type I Diabetes Mellitus     Psychotic Disorder Brother         bipolar     C.A.D. Brother      C.A.D. Father      Psychotic Disorder Father 49        suicide     Diabetes Maternal Grandfather 65        DMII     Parkinsonism Other                Physical Examination:  Resp. rate 16, height 1.651 m (5' 5\"), weight 64 kg (141 lb), not currently breastfeeding.  ***    Imaging:   Radiographs of the *** - *** views (2019)  ***    I have independently reviewed the above imaging studies; the results were discussed with the patient.     Assessment:   83 year old, {RIGHT/LEFT/AMBI:028322651::\"right handed\"} female with ***    Plan:   1. Antibiotic  2. Drain  3. Keflex 2 days   4. Follow up on      Procedure:   After written informed consent was obtained, the patient's {LEFT/RIGHT/BILATLY:649026659} *** was prepped with chloraprep.  A combination of {ORTHO INJECTION M} of {ORTHO INJ CHOICE:021456} and {NUMBERS 1-10:992947}cc {LIDOCAINE:298455376} were injected into the ***.  There were no immediate complications.      Scribe Disclosure:  Nikko REDDY, am serving as a scribe to document services personally performed by Josh Alberto MD at this visit, based upon the provider's statements to me. All documentation has been reviewed by the aforementioned provider prior to being entered into the official medical record. ***    Nikko REDDY, a scribe, prepared the chart for today's encounter. ***        Again, thank you for allowing me to participate " in the care of your patient.      Sincerely,    Josh Alberto MD

## 2019-08-22 NOTE — PROGRESS NOTES
[facility]  Orthopedics  Josh Alberto MD  2019     Name: Lynnette Mata  MRN: [id number]  Age: 83 year old  : 1935  Referring provider: Referred Self     Chief Complaint: Edema of the Right Index Finger     Date of Injury: 3 days ago    History of Present Illness:   Lynnette Mata is a 83 year old, right handed female who presents today for evaluation of right index finger pain. She reports that her finger started getting irritated on Monday this week. Her finger tip was tender to touch. Her finger has become more discolored and more tender to touch. Denies taking any medication to alleviate her symptoms. Denies any drainage. No further concerns at this time.    Review of Systems:   A 10-point review of systems was obtained and is negative except for as noted in the HPI.     Medications:     Current Outpatient Medications:      aspirin 81 MG chewable tablet, Take 81 mg by mouth daily., Disp: , Rfl:      Calcium Carbonate (CALCIUM 600 PO), Take  by mouth daily., Disp: , Rfl:      cephALEXin (KEFLEX) 500 MG capsule, Take 1 capsule (500 mg) by mouth 2 times daily for 7 days, Disp: 14 capsule, Rfl: 0     fish oil-omega-3 fatty acids (FISH OIL) 1000 MG capsule, Take 2 g by mouth daily., Disp: , Rfl:      Glucosamine 500 MG CAPS, Take  by mouth daily., Disp: , Rfl:      lisinopril (PRINIVIL/ZESTRIL) 10 MG tablet, Take 1 tablet (10 mg) by mouth daily, Disp: 90 tablet, Rfl: 3     Multiple Vitamin (MULTI-VITAMIN) per tablet, Take 1 tablet by mouth daily., Disp: , Rfl:      simvastatin (ZOCOR) 20 MG tablet, TAKE 1 TABLET(20 MG) BY MOUTH DAILY, Disp: 90 tablet, Rfl: 0     traZODone (DESYREL) 50 MG tablet, Take 1 tablet (50 mg) by mouth nightly as needed for sleep, Disp: 30 tablet, Rfl: 3    Allergies:  Nkda     Past Medical History:  Asymptomatic menopausal state  Shingles  Stricture and stenosis of cervix  Constipation    Past Surgical History:  Colonoscopy     Social History:  She denies tobacco use  "and admits to rare alcohol use.     Family History:  Mother - MI  Maternal Grandmother - CVD  Maternal Aunt - Colorectal Cancer  Brother - Endocrine Disease, Bipolar disorder, CAD  Father - CAD, Depression  Maternal Grandfather - Diabetes    Physical Examination:  Resp. rate 16, height 1.651 m (5' 5\"), weight 64 kg (141 lb), not currently breastfeeding.  General: Alert, pleasant, no distress  Right index finger: Diffuse soft tissue swelling over the fingertip with some small whitish discoloration over the nail fold on the radial side.  There is no significant fluctuance or induration though is diffusely tender to palpation.  Mildly warm to the touch.  Able to flex extend at the MCP PIP and DIP with no limitation and minimal discomfort.  Sensation is intact light touch.    Assessment:   83 year old, right handed female with acute paronychia.     Plan:  1. Prescribed Keflex 500 mg 2x daily for a week.  2. Recommended soak with warm water and compress.  3. Follow up on Sunday for reevaluation of the infection. Possible drainage based on appearance at that time.     We, Nikko Souza and Elijah Moore, are serving as scribes to document services personally performed by Josh Alberto MD at this visit, based upon the provider's statements to me. All documentation has been reviewed by the aforementioned provider prior to being entered into the official medical record.    "

## 2019-08-22 NOTE — PATIENT INSTRUCTIONS
Patient Education     Paronychia of the Finger or Toe  Paronychia is an infection near a fingernail or toenail. It usually occurs when an opening in the cuticle or an ingrown toenail lets bacteria under the skin.  The infection will need to be drained if pus is present. If the infection has been caught early, you may need only antibiotic treatment. Healing will take about 1 to 2 weeks.  Home care  Follow these guidelines when caring for yourself at home:    Clean and soak the toe or finger. Do this 2 times a day for the first 3 days. To do so:  ? Soak your foot or hand in a tub of warm water for 5 minutes. Or hold your toe or finger under a faucet of warm running water for 5 minutes.  ? Clean any crust away with soap and water using a cotton swab.  ? Put antibiotic ointment on the infected area.    Change the dressing daily or any time it gets dirty.    If you were given antibiotics, take them as directed until they are all gone.    If your infection is on a toe, wear comfortable shoes with a lot of toe room. You can also wear open-toed sandals while your toe heals.    You may use over-the-counter medicine (acetaminophen or ibuprofen to help with pain, unless another medicine was prescribed. If you have chronic liver or kidney disease, talk with your healthcare provider before using these medicines. Also talk with your provider if you've had a stomach ulcer or GI (gastrointestinal) bleeding.  Prevention  The following can prevent paronychia:    Avoid cutting or playing with your cuticles at home.    Don't bite your nails.    Don't suck on your thumbs or fingers.  Follow-up care  Follow up with your healthcare provider, or as advised.  When to seek medical advice  Call your healthcare provider right away if any of these occur:    Redness, pain, or swelling of the finger or toe gets worse    Red streaks in the skin leading away from the wound    Pus or fluid draining from the nail area    Fever of 100.4 F (38 C) or  higher, or as directed by your provider  Date Last Reviewed: 8/1/2016 2000-2018 [employer] [address] [place] [address], [place] [address]. All rights reserved. This information is not intended as a substitute for professional medical care. Always follow your healthcare professional's instructions.

## 2019-08-22 NOTE — PROGRESS NOTES
SPORTS & ORTHOPEDIC WALK-IN VISIT 8/22/2019    Primary Care Physician: Dr. Davenport    8/19/19 - Does not recall any injury.  Since Monday, swelling and discomfort have increased steadily.  Very tender by the nailbed.    Reason for visit:     What part of your body is injured / painful?  right index finger    What caused the injury /pain? No inciting event     How long ago did your injury occur or pain begin? several days ago    What are your most bothersome symptoms? Pain and Swelling    How would you characterize your symptom?  aching, dull and sharp    What makes your symptoms better? Nothing    What makes your symptoms worse? Movement/touching    Have you been previously seen for this problem? No    Medical History:    Any recent changes to your medical history? No    Any new medication prescribed since last visit? No    Have you had surgery on this body part before? No    Social History:    Occupation: retired    Handedness: Right    Exercise: None    Review of Systems:    Do you have fever, chills, weight loss? No    Do you have any vision problems? No    Do you have any chest pain or edema? No    Do you have any shortness of breath or wheezing?  No    Do you have stomach problems? No    Do you have any numbness or focal weakness? No    Do you have diabetes? No    Do you have problems with bleeding or clotting? No    Do you have an rashes or other skin lesions? No

## 2019-08-23 ENCOUNTER — OFFICE VISIT (OUTPATIENT)
Dept: ORTHOPEDICS | Facility: CLINIC | Age: 84
End: 2019-08-23
Payer: MEDICARE

## 2019-08-23 DIAGNOSIS — L03.011 PARONYCHIA OF FINGER OF RIGHT HAND: Primary | ICD-10-CM

## 2019-08-23 NOTE — LETTER
2019       RE: Lynnette Mata  [address]  [facility] [address]     Dear Colleague,    Thank you for referring your patient, Lynnette Mata, to the [facility] SPORTS AND ORTHOPAEDIC WALK IN CLINIC at [facility]. Please see a copy of my visit note below.    [facility]  Orthopedics  AnthonyVasile Shah, DO  2019     Name: Lynnette Maat  MRN: [id number]  Age: 83 year old  : 1935  Referring provider: Referred Self     Chief Complaint: No chief complaint on file.     Date of Onset: 19     History of Present Illness:   Lynnette Mata is a 83 year old, right handed female who presents today for follow-up regarding right index finger pain. Dr. Alberto last evaluated this patient on 19 and she reported that her finger had some discoloration and was tender to palpation 3 days prior to the visit. They elected to proceed with a regimen of Keflex 500 mg 2x daily for a week, warm water soak and compression, and an 19  follow up and they would decide if drainage is necessary. Today, she reports that her finger had started to become more discolored and was beginning to leak pus. She was concerned that she would need multiple drainages and insisted that the right index finger be drained today. No further concerns at this time.    Review of Systems:   A 10-point review of systems was obtained and is negative except for as noted in the HPI.     Physical Examination:  General  - normal appearance, in no obvious distress  CV  - normal radial pulse  Pulm  - normal respiratory pattern, non-labored  Musculoskeletal -Right index finger  - inspection: Erythema of the second distal phalanx. Area of fluctuance seen near the radial aspect of the distal phalanx near the lateral nail fold. Nodular appearance of the second PIP joint.   - palpation: no tenderness at the anatomical snuffbox. Tender at the middle and distal phalanx.   - ROM:  ROM grossly  intact  Neuro  - no numbness, no motor deficit, grossly normal coordination, normal muscle tone  Skin  - Erythema, swelling of second distal phalanx  Psych  - interactive, appropriate, normal mood and affect    Assessment:   83 year old, right handed female with presenting with acute paronychia with formation of abscess. Treatment options disgused and we decided to proceed with incision and drainage today.    Plan:   1. Continue course of Keflex 500 mg 2x daily   2. Post I&D instructions provided for bandaging  3. Analgesics as needed, change dressing daily  4. Follow up in 1 week with Dr. Alberto or myself.       Procedure:   Incision and drainage right 2nd finger  The patient was informed of the risks and the benefits of the procedure and a written consent was signed.  The patient s right second finger was prepped with chlorhexidine in sterile fashion.   Local anesthesia was performed using a 27-gauge 1.5-inch needle to administer total of 4 mL of 1% lidocaine  without epi in accordance with ring block at finger. After anesthesia achieved, a 15 blade scalpel used to create an incision at medial nail fold in region of abscess.  Purulent material expressed.  A noniodoform gauze strip was packed into incision at nail fold. There were no complications. The patient tolerated the procedure well. There was negligible bleeding. The patient was instructed to ice the knee upon leaving clinic and refrain from overuse over the next 3 days. The patient was instructed to call or go to the emergency room with any unusual pain, swelling, redness, or if otherwise concerned.    IEddie DO, have reviewed the above note and agree with the scribe's notation as written.    Scribe Disclosure:  Elijah REDDY, am serving as a scribe to document services personally performed by Eddie Shah DO at this visit, based upon the provider's statements to me. All documentation has been reviewed by the aforementioned provider prior to  being entered into the official medical record.             Again, thank you for allowing me to participate in the care of your patient.      Sincerely,    Eddie Shah, DO

## 2019-08-23 NOTE — PROGRESS NOTES
[facility]  Orthopedics  Eddie Shah,   2019     Name: Lynnette Mata  MRN: [id number]  Age: 83 year old  : 1935  Referring provider: Referred Self     Chief Complaint: No chief complaint on file.     Date of Onset: 19     History of Present Illness:   Lynnette Mata is a 83 year old, right handed female who presents today for follow-up regarding right index finger pain. Dr. Alberto last evaluated this patient on 19 and she reported that her finger had some discoloration and was tender to palpation 3 days prior to the visit. They elected to proceed with a regimen of Keflex 500 mg 2x daily for a week, warm water soak and compression, and an 19  follow up and they would decide if drainage is necessary. Today, she reports that her finger had started to become more discolored and was beginning to leak pus. She was concerned that she would need multiple drainages and insisted that the right index finger be drained today. No further concerns at this time.    Review of Systems:   A 10-point review of systems was obtained and is negative except for as noted in the HPI.     Physical Examination:  General  - normal appearance, in no obvious distress  CV  - normal radial pulse  Pulm  - normal respiratory pattern, non-labored  Musculoskeletal -Right index finger  - inspection: Erythema of the second distal phalanx. Area of fluctuance seen near the radial aspect of the distal phalanx near the lateral nail fold. Nodular appearance of the second PIP joint.   - palpation: no tenderness at the anatomical snuffbox. Tender at the middle and distal phalanx.   - ROM:  ROM grossly intact  Neuro  - no numbness, no motor deficit, grossly normal coordination, normal muscle tone  Skin  - Erythema, swelling of second distal phalanx  Psych  - interactive, appropriate, normal mood and affect    Assessment:   83 year old, right handed female with presenting with acute paronychia with formation of  abscess. Treatment options disgused and we decided to proceed with incision and drainage today.    Plan:   1. Continue course of Keflex 500 mg 2x daily   2. Post I&D instructions provided for bandaging  3. Analgesics as needed, change dressing daily  4. Follow up in 1 week with Dr. Alberto or myself.       Procedure:   Incision and drainage right 2nd finger  The patient was informed of the risks and the benefits of the procedure and a written consent was signed.  The patient s right second finger was prepped with chlorhexidine in sterile fashion.   Local anesthesia was performed using a 27-gauge 1.5-inch needle to administer total of 4 mL of 1% lidocaine  without epi in accordance with ring block at finger. After anesthesia achieved, a 15 blade scalpel used to create an incision at medial nail fold in region of abscess.  Purulent material expressed.  A noniodoform gauze strip was packed into incision at nail fold. There were no complications. The patient tolerated the procedure well. There was negligible bleeding. The patient was instructed to ice the knee upon leaving clinic and refrain from overuse over the next 3 days. The patient was instructed to call or go to the emergency room with any unusual pain, swelling, redness, or if otherwise concerned.    IEddie DO, have reviewed the above note and agree with the scribe's notation as written.    Scribe Disclosure:  IElijah, am serving as a scribe to document services personally performed by Eddie Shah DO at this visit, based upon the provider's statements to me. All documentation has been reviewed by the aforementioned provider prior to being entered into the official medical record.

## 2019-08-24 NOTE — PROGRESS NOTES
"Health Psychology                                      Department of Medicine                                           [facility] Mail Code 460    Dana Hu, Ph.D., L.P. [phone]  [address] [place] [address], [facility] Any Beck, Ph.D.,  L.P. [phone]  [place] [address]  Bhargav Vitale, Ph.D., A.B.P.P., L.P. [phone]      Cass Mcpherson, PhD, LP [phone]        REFERRAL SOURCE  NP in [facility], Thania Carty and patient elected to return to treatment.  Last seen 9/2018    CHIEF COMPLAINT/REASON FOR VISIT  Psychological evaluation for coping with insomnia, anxiety and depression.  Patient coping with death of spouse May, 2017.    Patient was seen today for a 90 minute standard diagnostic assessment in clinical health psychology.  Given the length of time since last appointment, performed clinical assessment.    HISTORY OF PRESENT ILLNESS  The patient is a very pleasant 83 year old  female who previously worked in [employer] at [employer].  She lives alone and has recently moved into an assisted living facility with the availability of progressive level of care (\"allows you to die by degrees\") should she need more nursing assistance in the future.  She reported that she is enjoying her home and the facility.      She reported an awareness of insomnia beginning in late June.  She reports that she occasionally naps and is using a Fitbit so she can measure the quality of her sleep.  She described feeling better during the Fall, Winter and into the Spring of 2019 but her mood and energy declined in June 2019 and she is perplexed as to the cause.  She reported that she has a 10 pound weight loss since the spring that she is not able to attribute to anything.  She reported that her mood has been blue and she perceives that her personality has \"dried up\".  She denies that her depressed mood has been severe enough to interfere with " "functioning but endorses that she is not enjoying her life as she used to. She also described awareness that \"life is harder when you are depressed\".  She reported that she has largely been unable to enjoy a visit from family or a cruise to [place].        In addition, the patient describes some concern that she may have a mild bipolar disorder.  Her brother had severe bipolar illness and the patient describes some awareness that at times, her mood is quite elevated and that she has difficulty concentrating, weight loss but that during these times, she also has less confidence and has decision making challenges.  Informed her of the diagnostic criteria and that she does not appear to meet these criteria.  Instead, proposed that her depressive episodes may vascillate with some anxiety and that the \"wero\" may be symptoms of anxiety.      Encouraged patient to consider engaging in therapy so that we might better address the thoughts that might be bothering her.  Given the similarity between the symptoms she described today and those she reported one year ago, encouraged her to explore origin and to visit with psychiatry to be evaluated for possible antidepressant medications.      History of Present Illness:   She reported that her brother was diagnosed with bipolar disorder and that he had multiple suicide attempts.  She also reported that her father suicided at the age of 51.      SYSTEMS REVIEW  Caffeine: Patient reported drinking 1 cup of coffee per day.  Tobacco: Patient denied history or current use.   Alcohol: Patient denied history or current alcohol abuse or dependence.  Illegal/recreational drug usage: Patient denied history or current use.     PAST MEDICAL/SURGICAL HISTORY  Patient denied ever having seen a psychologist, psychiatrist or other type of mental health provider.     SOCIAL HISTORY  SOCIAL/DEVELOPMENTAL HISTORY  She reported having one brother, and described her childhood as difficult given that " their father was diagnosed with bipolar disorder and father suicided when he was 51. Patient denied experiencing any learning difficulties in school, and stated that she graduated from high school and college and worked as a .  She  her when she was 36 and she described a wonderful marriage of over 40 years.  They did not have children.  She reported that she has a moderately close relationship with her brothers 4 children but they do not have regular contact.  She denied any  service.  She is retired and has no financial concerns. Patients   in May 2017 after 4 years with PD.  She enjoys her [employer] and is part of the choir, she also quilPrixel and is an avid reader.  She has also traveled extensively and has lived abroad on several occasions.  Her last trip was in  when she went on a cruise to [place].      FAMILY HISTORY  Patient reported history of Bipolar Disorder in her brother and father.  Father suicided when he was age 51.      MENTAL STATUS EXAMINATION  Appearance/Behavior: The patient was on time, appropriately groomed and dressed, and demonstrated good eye contact. Her speech was clear and consistent, and there was no evidence of psychomotor agitation.   Consciousness/Orientation: Alert and oriented to person, place, time, and situation.   Cooperation/Reliability: The patient appeared to honestly respond to questions about psychosocial functioning. Patient is deemed a reliable historian.   Mood/Affect: Mood euthymic; appropriate range of affect. Patient reported some depressed mood and some symptoms of anxiety, particularly in the early morning.  She also stated that she is experiencing some feelings of overwhelm surrounding selling her home.      Appetite: Patient described decreaed and 10# weight loss.    Sleep: Patient reported sometimes finding it difficult to stay asleep, particularly after waking up to void.  She reported that Trazodone has helped in the past but  "that she is not taking this currently.     Body image: Patient reported no problems with body image and she is enjoying having less weight but does not wish to lose more.    Speech/Language: Speech was logical and coherent. Speech was of normal rate, rhythm and volume.   Thought Form: Overall logical and organized   Thought Content: Appropriate to interview and situation. Patient appeared focused on her concerns about dealing with selling home and coping with loss of functioning and changes after losing spouse.   Perception:  Patient denied any difficulties with a thought disorder, including auditory or visual hallucinations. Denied any history of obsessive-compulsive disorder or of wero.   Cognition/Memory: No difficulties apparent upon interview; not formally assessed.    Fund of Knowledge: Consistent with age, level of education, and life experience.   Abstract Reasoning: Appropriate; not formally assessed.  Judgment: No difficulties apparent upon interview.  Insight/Motivation: Appropriate to situation. The patient is seeking assistance for coping with anxiety and depression  Suicide/Assault: Patient denies current suicidal or assaultive ideation, plan, or intent but she endorsed thoughts that she was done living as recently as August 2018.  She reported that she is not feeling suicidal now and reported that her Spiritism beliefs will prevent her from suicidal plan or intent.    IMPRESSION:  The patient is a 83 year old,  female,  in May 2017 who is a retired .  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  She relocated to an assited living facility in 2019 and is enjoying this and feels less stress.  However, she noted that her mood is again depressed and that she is challenged by insomnia and appetite loss.  In addition, she described anhedonia.  These symptoms occasionally vacillate with periods that she describes as \"manic\" but they " may be attributed to anxiety.  She would like treatment to understand these symptoms and to be sure that she does not have bipolar disorder.      Time In:  11:00  Time Out:  12:00    DIAGNOSIS:  Axis I Adjustment disorder with mixed dep and anx   Axis II Deferred   Axis III Please see medical records for details.    Axis IV Psychosocial and Environmental Stressors: Health & upcoming move, loss of      PLAN:  The following recommendations were made to the patient:    1. Follow-up Services: We recommended a follow-up appointment with Dr. Mcpherson in clinical health psychology for cognitive behavioral therapy to aid with adjustment to, and coping with, current depression and anxiety and support during transition.       The patient appeared amenable to these recommendations and an appointment has been set up with Dr. Cass Mcpherson  for 4 weeks (earliest she would agree to). We remain available to the patient as needed.    Cass Mcpherson, Ph.D., L.P.

## 2019-08-28 ENCOUNTER — DOCUMENTATION ONLY (OUTPATIENT)
Dept: CARE COORDINATION | Facility: CLINIC | Age: 84
End: 2019-08-28

## 2019-08-30 ENCOUNTER — OFFICE VISIT (OUTPATIENT)
Dept: ORTHOPEDICS | Facility: CLINIC | Age: 84
End: 2019-08-30
Payer: MEDICARE

## 2019-08-30 VITALS — WEIGHT: 141 LBS | HEIGHT: 65 IN | BODY MASS INDEX: 23.49 KG/M2

## 2019-08-30 DIAGNOSIS — L03.011 PARONYCHIA OF FINGER OF RIGHT HAND: Primary | ICD-10-CM

## 2019-08-30 ASSESSMENT — PAIN SCALES - GENERAL: PAINLEVEL: NO PAIN (0)

## 2019-08-30 ASSESSMENT — MIFFLIN-ST. JEOR: SCORE: 1090.45

## 2019-08-30 NOTE — PROGRESS NOTES
SPORTS & ORTHOPEDIC WALK-IN FOLLOW-UP VISIT 8/30/2019    Interval History:     Follow up reason: F/U index finger on right hand     Date of injury: weeks ago     Date last seen: 8/23/19    Following Therapeutic Plan: Yes     Pain: Improving    Function: Improving    Medical History:    Any recent changes to your medical history? No    Any new medication prescribed since last visit? No    Review of Systems:    Do you have fever, chills, weight loss? No    Do you have any vision problems? No    Do you have any chest pain or edema? No    Do you have any shortness of breath or wheezing?  No    Do you have stomach problems? No    Do you have any numbness or focal weakness? No    Do you have diabetes? No    Do you have problems with bleeding or clotting? No    Do you have an rashes or other skin lesions? No

## 2019-08-30 NOTE — PROGRESS NOTES
[facility]  Orthopedics  Josh Alberto MD  2019     Name: Lynnette Mata  MRN: [id number]  Age: 84 year old  : 1935  Referring provider: Referred Self     Chief Complaint: Follow Up of the Right Hand     Date of Injury: 19    History of Present Illness:   Lynnette Mata is a 84 year old, right handed female who presents today for follow-up regarding right index finger pain. Dr. Shah was the last to see this patient on 19 and she reported that her finger had some additional discoloration and was beginning to leak pus. They elected to proceed with continuing the Keflex 500 mg course, analgesics PRN, and a follow up in 1 week with Dr. Shah or myself. Today she reports that she was wrapping, using the antibiotics, and cleaning the injury daily. She notes that it did not drain, is still swollen, and there is pain only on palpation. She has not been soaking the injury but discoloration has decreased and there are little to no signs of infection. No further concerns at this time.    Review of Systems:   A 10-point review of systems was obtained and is negative except for as noted in the HPI.     Physical Examination:  General: Alert, pleasant, no distress  Right index finger: There is mild soft tissue swelling over the distal aspect but there is no erythema, warmth.  Minimal tenderness to palpation.  There is no drainage from the previous I&D site.  Sensation is intact to light touch.  Is able to flex and extend at the DIP without difficulty or pain.    Assessment:   84 year old, right handed female with acute paronychia with formation of abscess doing well after 1 week on antibiotic and post drainage.    Plan:   1.  she is now been off antibiotic for over 24 hours with no sign of recurrent infection.    2. Recommend she continue to cover the incision.  She does not need to use antibiotic ointment any further.  3. Follow-up if return in pain, redness, swelling or increased  drainage.    Josh Alberto MD      Scribe Disclosure:  I, Elijah Moore, am serving as a scribe to document services personally performed by Josh Alberto MD at this visit, based upon the provider's statements to me. All documentation has been reviewed by the aforementioned provider prior to being entered into the official medical record.

## 2019-09-03 ENCOUNTER — HOSPITAL ENCOUNTER (OUTPATIENT)
Dept: PHYSICAL THERAPY | Facility: CLINIC | Age: 84
Setting detail: THERAPIES SERIES
End: 2019-09-03
Attending: PHYSICAL MEDICINE & REHABILITATION
Payer: MEDICARE

## 2019-09-03 DIAGNOSIS — M21.061 ACQUIRED GENU VALGUM OF BOTH KNEES: ICD-10-CM

## 2019-09-03 DIAGNOSIS — Z74.09 IMPAIRED FUNCTIONAL MOBILITY, BALANCE, GAIT, AND ENDURANCE: ICD-10-CM

## 2019-09-03 DIAGNOSIS — R29.818 NEUROGENIC CLAUDICATION: ICD-10-CM

## 2019-09-03 DIAGNOSIS — G89.29 CHRONIC PAIN OF BOTH KNEES: ICD-10-CM

## 2019-09-03 DIAGNOSIS — R26.89 IMPAIRMENT OF BALANCE: ICD-10-CM

## 2019-09-03 DIAGNOSIS — M25.561 CHRONIC PAIN OF BOTH KNEES: ICD-10-CM

## 2019-09-03 DIAGNOSIS — M25.562 CHRONIC PAIN OF BOTH KNEES: ICD-10-CM

## 2019-09-03 DIAGNOSIS — M21.062 ACQUIRED GENU VALGUM OF BOTH KNEES: ICD-10-CM

## 2019-09-03 PROCEDURE — 97110 THERAPEUTIC EXERCISES: CPT | Mod: GP | Performed by: PHYSICAL THERAPIST

## 2019-09-03 PROCEDURE — 97161 PT EVAL LOW COMPLEX 20 MIN: CPT | Mod: GP | Performed by: PHYSICAL THERAPIST

## 2019-09-03 NOTE — PROGRESS NOTES
[facility]        OUTPATIENT PHYSICAL THERAPY FUNCTIONAL EVALUATION  PLAN OF TREATMENT FOR OUTPATIENT REHABILITATION  (COMPLETE FOR INITIAL CLAIMS ONLY)  Patient's Last Name, First Name, M.I.  YOB: 1935  Lynnette Mata     Provider's Name   [facility]   Medical Record No.  [id number]     Start of Care Date:  09/03/19   Onset Date:  08/22/19   Type:     _X__PT   ____OT  ____SLP Medical Diagnosis:  Neurogenic claudication, chronic pain both knees, acquired genu valgum both knees, impaired balance, impaired functional mobility, balance, gait, endurance     PT Diagnosis:  Impaired functional mobility, balance, gait, endurance Visits from SOC:  1                              __________________________________________________________________________________  Plan of Treatment/Functional Goals:  balance training, gait training, joint mobilization, motor coordination training, neuromuscular re-education, ROM, strengthening, stretching, transfer training, manual therapy     Cryotherapy, Electrical Stimulation/Russion Stimulation     GOALS  TUG  Pt will complete the TUG in less than 13.5 seconds while using least restrictive assistive device in order to reduce her falls risk.  12/01/19    Gait speed  Pt will complete the 25-foot walk test in less than 10 seconds with least restrictive assistive device to improve her efficiency and safety of walking at home and in the community.  12/01/19    Dynamic balance  Pt will demonstrate improved dynamic balance as measured by an FGA score of at least 15/30, in order to improve her safety and reduce falls risk.  12/01/19    HEP  Pt will demonstrate independent performance of her home exercise program to maximize her ability to manage her condition and promote optimal wellness.  12/01/19    Exercise accountability  Pt will report  having made 2 phone calls to either a friend or to her sister to report her adherence on her HEP.   12/01/19        Therapy Frequency:  other (see comments)(1x every other week)   Predicted Duration of Therapy Intervention:  90 days    Caterina Healy, PT                                    I CERTIFY THE NEED FOR THESE SERVICES FURNISHED UNDER        THIS PLAN OF TREATMENT AND WHILE UNDER MY CARE     (Physician co-signature of this document indicates review and certification of the therapy plan).                Certification Date From:  09/03/19   Certification Date To:  12/01/19    Referring Provider:  Ariadne Tom MD    Initial Assessment  See Epic Evaluation- Start of Care Date: 09/03/19

## 2019-09-03 NOTE — PROGRESS NOTES
09/03/19 1600   Signing Clinician's Name / Credentials   Signing clinician's name / credentials Caterina Healy, PT, DPT, NCS, ATP   Functional Gait Assessment (HEATH Car, ROSENDO Vance, et al. (2004))   1. GAIT LEVEL SURFACE 1   2. CHANGE IN GAIT SPEED 1   3. GAIT WITH HORIZONTAL HEAD TURNS 2   4. GAIT WITH VERTICAL HEAD TURNS 1   5. GAIT AND PIVOT TURN 2   6. STEP OVER OBSTACLE 1   7. GAIT WITH NARROW BASE OF SUPPORT 0   8. GAIT WITH EYES CLOSED 2   9. AMBULATING BACKWARDS 2   10. STEPS 1   Total Functional Gait Assessment Score   TOTAL SCORE: (MAXIMUM SCORE 30) 13   Functional Gait Assessment (FGA): The FGA assesses postural stability during various walking tasks.   Gait assistive device used: None    Patient Score: 13/30  Scores of <22/30 have been correlated with predicting falls in community-dwelling older adults according to Josep & Tai 2010.   Scores of <18/30 have been correlated with increased risk for falls in patients with Parkinsons Disease according to Maximo Rosas Zhou et al 2014.  Minimal Detectable Change for patients with acute/chronic stroke = 4.2 according to Carlota & Ritschel 2009  Minimal Detectable Change for patients with vestibular disorder = 8 according to Josep & Tai 2010    Assessment (rationale for performing, application to patient s function & care plan): Baseline

## 2019-09-03 NOTE — PROGRESS NOTES
"   09/03/19 1500   Quick Adds   Quick Adds Certification   Type of Visit Initial OP PT Evaluation   General Information   Start of Care Date 09/03/19   Referring Physician Ariadne Tom MD   Orders Evaluate and Treat as Indicated   Order Date 08/22/19   Medical Diagnosis Neurogenic claudication, chronic pain both knees, acquired genu valgum both knees, impaired balance, impaired functional mobility, balance, gait, endurance   Onset of illness/injury or Date of Surgery 08/22/19   Precautions/Limitations fall precautions   Surgical/Medical history reviewed Yes   Pertinent history of current problem (include personal factors and/or comorbidities that impact the POC) Pt is here for a new bout of physical therapy; she was seen at this clinic about a year ago for balance and walking problems. \"I didn't follow through with some of the exercises from the last time we worked together. I went to the pool to walk through most of the winter and spring, but with the sale of my house, that fell by the wayside.\" Currently has an infection in her right index finger, not going to pool until this heals. Diagnosed with spinal stenosis, has declined to do more testing of the nerves in her back/legs. Pt has lost weight, not sleeping well for the past couple of months, thinks \"it's part of the bipolar\". Feeling \"foggy, like there is something between me and the world\" recently as well.    Pertinent Visual History  Wears trifocals, \"my eyes aren't as good as they were\", 20/30 in R eye and 20/40 in L eye. Potential cataract surgery in L eye in 6 months.    Previous/Current Treatment Physical Therapy   Improvement after PT Mild   Patient role/Employment history Retired   Living environment Apartment/condo  ([facility] at [facility])   Home/Community Accessibility Comments Elevator access to 7th floor   Current Assistive Devices Walking Poles  (Sometimes uses both, sometimes only one)   Patient/Family Goals Statement Try returning to " walking without help (trekking poles), work on balance   General Information Comments Pt self-admits that she has not adhered to her home exercise program, but does very much enjoy the pool. When asked about how she can keep herself accountable, she identified that she could ask a friend or her sister to be her accountability partner for her to check in with periodically.    Fall Risk Screen   Fall screen completed by PT   Have you fallen 2 or more times in the past year? No   Have you fallen and had an injury in the past year? No   Timed Up and Go score (seconds) 16.62   Is patient a fall risk? Yes   Fall screen comments With trekking pole: 20.09 sec.    Pain   Patient currently in pain No   Pain comments Sometimes has lower leg pain when walking   Vitals Signs   Heart Rate 69   Blood Pressure 131/58   Vital Signs Comments Seated, LUE.    Cognitive Status Examination   Orientation orientation to person, place and time   Level of Consciousness alert   Follows Commands and Answers Questions 100% of the time   Personal Safety and Judgment intact   Memory intact   Cognitive Comment reports feeling foggy recently   Posture   Posture Comments Persistent, chronic bilat knee valgus   Transfer Skills   Transfer Comments Relies on UE support to control sit to stand and return to sitting.   Gait   Gait Comments Relies on smaller steps with weight bearing on the medial aspects of her knees (genu valgum) when walking without an AD. Improved medial knee clearance and stability when walking with a trekking pole. Must use step-to pattern and rail to negotiate stairs.    Gait Special Tests   Gait Special Tests FUNCTIONAL GAIT ASSESSMENT   Gait Special Tests Functional Gait Assessment Score out of 30   Score out of 30 13   Comments Completed without an AD. Fall risk.    Modality Interventions   Planned Modality Interventions Cryotherapy;Electrical Stimulation/Russion Stimulation   Planned Therapy Interventions   Planned Therapy  Interventions balance training;gait training;joint mobilization;motor coordination training;neuromuscular re-education;ROM;strengthening;stretching;transfer training;manual therapy   Clinical Impression   Criteria for Skilled Therapeutic Interventions Met yes, treatment indicated   PT Diagnosis Impaired functional mobility, balance, gait, endurance   Influenced by the following impairments Weakness, LE bony alignment issues resulting in lever arm dysfunction, impaired balance, decreased endurance, pain   Functional limitations due to impairments Walking, sit to stand, stairs/curbs.   Clinical Presentation Stable/Uncomplicated   Clinical Decision Making (Complexity) Low complexity   Therapy Frequency other (see comments)  (1x every other week)   Predicted Duration of Therapy Intervention (days/wks) 90 days   Risk & Benefits of therapy have been explained Yes   Patient, Family & other staff in agreement with plan of care Yes   Clinical Impression Comments Pt is an 85 y/o female who has returned for a new episode of skilled PT to address balance, mobility, and strength. Pt has not adhered to her home exercises since her last episode of care about one year ago, but has been using and enjoying the pool for exercise. She presents with gait dysfunction due to weakness, impaired endurance and impaired lever arm dysfunction due to chronic genu valgum bilaterally. She would benefit from a new episode of skilled PT to address her balance, gait, and overall strength and endurance to maximize the resources available to her for function.    GOALS   PT Eval Goals 1;2;3;4;5   Goal 1   Goal Identifier TUG   Goal Description Pt will complete the TUG in less than 13.5 seconds while using least restrictive assistive device in order to reduce her falls risk.   Target Date 12/01/19   Goal 2   Goal Identifier Gait speed   Goal Description Pt will complete the 25-foot walk test in less than 10 seconds with least restrictive assistive  device to improve her efficiency and safety of walking at home and in the community.   Target Date 12/01/19   Goal 3   Goal Identifier Dynamic balance   Goal Description Pt will demonstrate improved dynamic balance as measured by an FGA score of at least 15/30, in order to improve her safety and reduce falls risk.   Target Date 12/01/19   Goal 4   Goal Identifier HEP   Goal Description Pt will demonstrate independent performance of her home exercise program to maximize her ability to manage her condition and promote optimal wellness.   Target Date 12/01/19   Goal 5   Goal Identifier Exercise accountability   Goal Description Pt will report having made 2 phone calls to either a friend or to her sister to report her adherence on her HEP.    Target Date 12/01/19   Total Evaluation Time   PT Chidi Low Complexity Minutes (06958) 48   Therapy Certification   Certification date from 09/03/19   Certification date to 12/01/19   Medical Diagnosis Neurogenic claudication, chronic pain both knees, acquired genu valgum both knees, impaired balance, impaired functional mobility, balance, gait, endurance   Certification I certify the need for these services furnished under this plan of treatment and while under my care.  (Physician co-signature of this document indicates review and certification of the therapy plan).

## 2019-09-17 ENCOUNTER — HOSPITAL ENCOUNTER (OUTPATIENT)
Dept: PHYSICAL THERAPY | Facility: CLINIC | Age: 84
Setting detail: THERAPIES SERIES
End: 2019-09-17
Attending: PHYSICAL MEDICINE & REHABILITATION
Payer: MEDICARE

## 2019-09-17 PROCEDURE — 97110 THERAPEUTIC EXERCISES: CPT | Mod: GP | Performed by: PHYSICAL THERAPIST

## 2019-09-18 ENCOUNTER — OFFICE VISIT (OUTPATIENT)
Dept: PSYCHOLOGY | Facility: CLINIC | Age: 84
End: 2019-09-18
Payer: MEDICARE

## 2019-09-18 DIAGNOSIS — F43.23 ADJUSTMENT DISORDER WITH MIXED ANXIETY AND DEPRESSED MOOD: Primary | ICD-10-CM

## 2019-09-18 DIAGNOSIS — R41.9 COGNITIVE COMPLAINTS: ICD-10-CM

## 2019-09-22 NOTE — PROGRESS NOTES
"Health Psychology - Follow up Visit  Confidential Summary*    DATE OF VISIT:  Sep 18, 2019      REFERRAL SOURCE:  self      CHIEF COMPLAINT/REASON FOR VISIT  Cognitive behavioral therapy and behavioral counseling in context of health and behavior issues related to depressed mod and increased anxiety surrounding perceived cognitive changes.  Patient is now living in an independent living facility (sold home June 2019) and was  approximately 2 years ago.      Patient was seen today for a 60 minute individual health and behavior intervention session.    Subjective:  Patient began with report that she continues to feel that she is living in a \"fog\".  Reported that she is detached and has lost confidence in driving.  She reported that she feels that she is not herself and that her friends may have noticed changes in her mood and excitement about living.  She reported that she has had these symptoms before in her life but that this time, it is lasting longer than it has in the past.  She has long believed that she had a \"touch of bipolar disorder\".  Her brother had a diagnosis of bipolar.  She reported that during her good periods, she is \"high spirited\" and has energy and excitement about her future, but during her more depressed days, she has trouble getting out of bed and does not enjoy much.  She denied clear symptoms of bipolar disorder including episodes of wero with behavioral correlates or clear sleep disturbance.      Discussed her current life and she endorsed being very busy with activities that she enjoys.  She reported that she has many friends and interests.  Although she has not made any new friends in her new residence, she knows many people who live there.  She denies feeling lonely.  She also denied that she is continuing to grieve the loss of her , Kaiden.  Although she reported that they had a very close relationship, she reported that she had been losing him for a long time because of " his PD with cognitive decline.  She reported that they  in 1972 when she was 36 and he was 40.  They lived independent lives but enjoyed one another.      Discussed sleep hygiene and the importance of regular sleep and wake cycles.      Patient may benefit from an evaluation with psychiatry, to rule out the possibility of bipolar symptoms and to provide reassurance if this is not a diagnosis that she has.  In addition, she may also benefit from an analysis of cognitive functioning    Objective:  Patient was on time for today s session, appropriately groomed and dressed, and demonstrated good eye contact.  She appeared friendly, alert and oriented, and was clearly indicating some irritability during the session. Mood was euthymic, with appropriate range of affect. Patient denied suicidal or assaultive ideation, plan, or intent.        Assessment:  The patient has a longstanding history of mood disturbance.  It is not clear whether the patient is more aware of changes in mood associated with situational stressors or if she may indeed have symptoms of a mood disturbance.      Plan:  Refer to psychiatry (MD Bernardino) for evaluation and see again in 1 month.  Refer for neuropsych testing to determine if patients self perception of cognitive decline is substantiated    Time In: 11:00  Time Out: 12:00    Diagnosis:  Axis I Depressive disorder, nos; adjustment disorder with depressed and anxious mood   Axis II Deferred   Axis III please see medical records for details   [place] IV Psychosocial and Environmental Stressors:Health & relocation to independent living facility           Cass Mcpherson, PhD, LP      *In accordance with the Rules of the [place] Board of [employer], it is noted that psychological descriptions and scientific procedures underlying psychological evaluations have limitations.  Absolute predictions cannot be made based on information in this report.

## 2019-10-01 ENCOUNTER — HOSPITAL ENCOUNTER (OUTPATIENT)
Dept: PHYSICAL THERAPY | Facility: CLINIC | Age: 84
Setting detail: THERAPIES SERIES
End: 2019-10-01
Attending: PHYSICAL MEDICINE & REHABILITATION
Payer: MEDICARE

## 2019-10-01 PROCEDURE — 97110 THERAPEUTIC EXERCISES: CPT | Mod: GP | Performed by: PHYSICAL THERAPIST

## 2019-10-11 DIAGNOSIS — R07.89 ATYPICAL CHEST PAIN: ICD-10-CM

## 2019-10-11 DIAGNOSIS — E78.01 FAMILIAL HYPERCHOLESTEROLEMIA: Primary | ICD-10-CM

## 2019-10-15 ENCOUNTER — HOSPITAL ENCOUNTER (OUTPATIENT)
Dept: PHYSICAL THERAPY | Facility: CLINIC | Age: 84
Setting detail: THERAPIES SERIES
End: 2019-10-15
Attending: PHYSICAL MEDICINE & REHABILITATION
Payer: MEDICARE

## 2019-10-15 PROCEDURE — 97110 THERAPEUTIC EXERCISES: CPT | Mod: GP | Performed by: PHYSICAL THERAPIST

## 2019-10-16 RX ORDER — SIMVASTATIN 20 MG
20 TABLET ORAL DAILY
Qty: 90 TABLET | Refills: 2 | Status: SHIPPED | OUTPATIENT
Start: 2019-10-16 | End: 2020-07-01

## 2019-11-02 ENCOUNTER — HEALTH MAINTENANCE LETTER (OUTPATIENT)
Age: 84
End: 2019-11-02

## 2019-11-12 ENCOUNTER — HOSPITAL ENCOUNTER (OUTPATIENT)
Dept: PHYSICAL THERAPY | Facility: CLINIC | Age: 84
Setting detail: THERAPIES SERIES
End: 2019-11-12
Attending: PHYSICAL MEDICINE & REHABILITATION
Payer: MEDICARE

## 2019-11-12 PROCEDURE — 97112 NEUROMUSCULAR REEDUCATION: CPT | Mod: GP | Performed by: PHYSICAL THERAPIST

## 2019-11-12 PROCEDURE — 97110 THERAPEUTIC EXERCISES: CPT | Mod: GP | Performed by: PHYSICAL THERAPIST

## 2019-11-15 ENCOUNTER — OFFICE VISIT (OUTPATIENT)
Dept: NEUROPSYCHOLOGY | Facility: CLINIC | Age: 84
End: 2019-11-15
Attending: PSYCHOLOGIST
Payer: MEDICARE

## 2019-11-15 DIAGNOSIS — R41.3 COMPLAINTS OF MEMORY DISTURBANCE: Primary | ICD-10-CM

## 2019-11-15 DIAGNOSIS — F39 IMPAIRED MOOD REGULATION (H): ICD-10-CM

## 2019-11-18 ENCOUNTER — OFFICE VISIT (OUTPATIENT)
Dept: INTERNAL MEDICINE | Facility: CLINIC | Age: 84
End: 2019-11-18
Payer: MEDICARE

## 2019-11-18 VITALS
WEIGHT: 135 LBS | SYSTOLIC BLOOD PRESSURE: 135 MMHG | OXYGEN SATURATION: 97 % | DIASTOLIC BLOOD PRESSURE: 67 MMHG | HEART RATE: 82 BPM | BODY MASS INDEX: 22.47 KG/M2

## 2019-11-18 DIAGNOSIS — F32.1 MODERATE MAJOR DEPRESSION (H): Primary | ICD-10-CM

## 2019-11-18 RX ORDER — ESCITALOPRAM OXALATE 10 MG/1
10 TABLET ORAL DAILY
Qty: 90 TABLET | Refills: 3 | Status: SHIPPED | OUTPATIENT
Start: 2019-11-18 | End: 2020-09-23

## 2019-11-18 ASSESSMENT — PAIN SCALES - GENERAL: PAINLEVEL: NO PAIN (0)

## 2019-11-18 NOTE — PROGRESS NOTES
S) Ms. Mata is seen for ongoing anxiety and depression. She has a family history of mental health problems- father committed suicide and brother has severe BAD. She's had depression on and off over the years but has felt worse in past year. Seeing Cass Mcpherson in Health Psychology and underwent neuropsych testing last week. PHQ-9 score of 13 and RUDI-7 score of 6.     Current Outpatient Medications   Medication     aspirin 81 MG chewable tablet     Calcium Carbonate (CALCIUM 600 PO)     fish oil-omega-3 fatty acids (FISH OIL) 1000 MG capsule     Glucosamine 500 MG CAPS     lisinopril (PRINIVIL/ZESTRIL) 10 MG tablet     Multiple Vitamin (MULTI-VITAMIN) per tablet     simvastatin (ZOCOR) 20 MG tablet     No current facility-administered medications for this visit.          O) /67 (BP Location: Right arm, Patient Position: Sitting, Cuff Size: Adult Regular)   Pulse 82   Wt 61.2 kg (135 lb)   SpO2 97%   BMI 22.47 kg/m    Well appearing  Passes mini-cog  Cor RRR no MRG  Lungs clear  No LE edema  No other neuro findings    A/P) 1) HTN. BP more elevated than her usual but AHA protocol not followed for measurement. Continue lisinopril 10 mg/d.  2) Depression/anxiety. This is moderate and greatly impacts her quality of life. Long discussion on risks/benefits of treatment and potential side effects including increased anxiety in first few weeks, increased suicidal thoughts, and others. She agrees to 10 mg escitalopram daily (start with 5 daily for 2d then increase). Will see her back in 4 weeks. Health Psychology f/u recommended.    Over 15 min of 25 min visit spent in care coordination and counseling related to the above issues.    Nickolas Davenport MD, FACP, FAAP

## 2019-11-18 NOTE — PATIENT INSTRUCTIONS
[facility] Medication Refill Request Information:  * Please contact your pharmacy regarding ANY request for medication refills.  ** [facility] Prescription Fax = [phone]  * Please allow 3 business days for routine medication refills.  * Please allow 5 business days for controlled substance medication refills.     [facility] Test Result notification information:  *You will be notified with in 7-10 days of your appointment day regarding the results of your test.  If you are on MyChart you will be notified as soon as the provider has reviewed the results and signed off on them.    [facility]: [phone]

## 2019-11-18 NOTE — NURSING NOTE
Chief Complaint   Patient presents with     Physical     pt here for physical       Miriam Nieves CMA, EMT at 1:46 PM on 11/18/2019.

## 2019-11-18 NOTE — PROGRESS NOTES
Pt was seen for neuropsychological evaluation at the request of Dr. Cass Mcpherson for the purposes of diagnostic clarification and treatment planning. 172 minutes of test administration and scoring were provided by this writer. Please see Dr. Aguila Vasquez's report for a full interpretation of the findings.    Josh Gayle  Psychometrist

## 2019-11-26 ENCOUNTER — HOSPITAL ENCOUNTER (OUTPATIENT)
Dept: PHYSICAL THERAPY | Facility: CLINIC | Age: 84
Setting detail: THERAPIES SERIES
End: 2019-11-26
Attending: PHYSICAL MEDICINE & REHABILITATION
Payer: MEDICARE

## 2019-11-26 PROCEDURE — 97110 THERAPEUTIC EXERCISES: CPT | Mod: GP | Performed by: PHYSICAL THERAPIST

## 2019-11-26 PROCEDURE — 97112 NEUROMUSCULAR REEDUCATION: CPT | Mod: GP | Performed by: PHYSICAL THERAPIST

## 2019-11-26 NOTE — PROGRESS NOTES
[facility]      OUTPATIENT PHYSICAL THERAPY  PLAN OF TREATMENT FOR OUTPATIENT REHABILITATION    Patient's Last Name, First Name, M.I.                YOB: 1935  Lynnette Mata                        Provider's Name  [facility] Medical Record No.  [id number]                               Onset Date: 8/22/2019   Start of Care Date: 9/3/2019   Type:     _X_PT   ___OT   ___SLP Medical Diagnosis: Neurogenic claudication, chronic pain both knees, acquired genu valgum both knees                       PT Diagnosis: Impaired functional mobility, gait, balance, and endurance      _________________________________________________________________________________  Plan of Treatment:    Frequency/Duration: Continue 1-2 visits per month for 3 more months.      Goals:  Goal Identifier TUG   Goal Description Pt will complete the TUG in less than 13.5 seconds while using least restrictive assistive device in order to reduce her falls risk.   Target Date 2/25/2020   Date Met      Progress: Improving     Goal Identifier Gait speed   Goal Description Pt will complete the 25-foot walk test in less than 10 seconds with least restrictive assistive device to improve her efficiency and safety of walking at home and in the community.   Target Date 12/01/19   Date Met  11/12/19   Progress: MET     Goal Identifier Dynamic balance   Goal Description Pt will demonstrate improved dynamic balance as measured by an FGA score of at least 15/30, in order to improve her safety and reduce falls risk.   Target Date 12/01/19   Date Met  11/12/19   Progress: MET     Goal Identifier HEP   Goal Description Pt will demonstrate independent performance of her home exercise program to maximize her ability to manage her condition and promote optimal wellness.   Target Date 2/25/2020   Date Met      Progress: Improving      Goal Identifier Exercise accountability   Goal Description Pt will report having made 2 phone calls to either a friend or to her sister to report her adherence on her HEP.    Target Date 12/01/19   Date Met  11/26/19   Progress: MET     Progress Toward Goals:   Progress this reporting period: Very good progress this reporting as patient met 3 of her 5 goals and made progress on the other two. Her dynamic balance and gait speed have improved, and she has started to use phone calls to a friend or her sister as an accountability strategy to stay on her home program. She has been experiencing depression that impacts her daily function, and was recently started on medication for this. This has been a barrier to further progress and will lengthen the amount of time needed to achieve her final goals. She enjoys her pool exercises but the depression has been impacting her ability to attend consistently in the past month or so. Pt requires continued skilled therapy to meet her final goals and maximize her safety and mobility at home and in the community.     Certification date from 11/26/2019 to 2/25/2020.    Caterina Healy, PT          I CERTIFY THE NEED FOR THESE SERVICES FURNISHED UNDER        THIS PLAN OF TREATMENT AND WHILE UNDER MY CARE     (Physician co-signature of this document indicates review and certification of the therapy plan).                Referring Provider: Ariadne Tom

## 2019-12-10 DIAGNOSIS — I10 ESSENTIAL HYPERTENSION, BENIGN: ICD-10-CM

## 2019-12-10 DIAGNOSIS — Z91.81 PERSONAL HISTORY OF FALL: ICD-10-CM

## 2019-12-10 DIAGNOSIS — R26.89 BALANCE PROBLEMS: ICD-10-CM

## 2019-12-10 DIAGNOSIS — M62.81 GENERALIZED MUSCLE WEAKNESS: ICD-10-CM

## 2019-12-10 NOTE — PROGRESS NOTES
Name: Lynnette Mata MRN: [id number]  : 1935  RAMOS: 11/15/2019  Staff: CHARLES Tech: MADI Age: 84  Sex: Female Hand: Right Educ: 18  Vision: 20/30 ?with correction / ?without correction    ORIENTATION     Time  -0     Place       Personal info          Presidents     WRAT4   SS %ile Grade Equiv.     Word Reading  136 99 >12.9    WAIS-IV  Raw SSa     Similarities 29 14     Block Design 24 10     Digit Span 24 11 RDS= 8     Coding 36 9    COWAT (CFL)     Raw: 34  SS: 10 %ile: 41-59    ANIMAL NAMING TEST     Raw: 11  SS: 5 T: 29    [place] NAMING TEST     Raw: 51  SS: 10 %ile: 41-59    TOKEN TEST     Raw: 44  SS: 13 %ile: 82-89    CLOCK DRAWING: Normal    FINGER TAPPING   Avg  SS T     RH  43 7 55     LH 36.67 6 47     GROOVED PEGBOARD   Raw  Drops SS T     RH  111  1 4 40     LH 98  0 6 50    TRAILMAKING TEST   Raw  Err SS %ile     A 55  0 9 29-40     B 115  1 10 41-59    STROOP TEST   Raw SS %ile     Word 93 12 72-81     Color  57 12  72-81     C/W 30 12  72-81    PORTEUS MAZE TEST     Test Age: 15.5    WMS-IV  Raw SS / %ile     LM I  28 10     LM II  16 11     LM Recog. 17 26th-50th     VR I  29 12     VR II  10 9     VR Recog. 6 >75th    HVLT-R     Trial 1 2 3      4 8 8  Raw T     Total Learning (1-3) 20 45     Delayed Recall  6 44     Percent Retention 75 47     Recognition Hits/FP 11/4 48    BVMT-R     Trial 1 2 3      2 5 7  Raw T / %ile     Total Learning (1-3) 14 39     Delayed Recall  5 39     Percent Retention 72 11th-16th     Recognition Hits/FP 5/0 >16th     MMPI-2-RF     RCd: 49 VRIN-r:  34     RC1: 47 DARRIN-r:  50     RC2: 65 F-r:  51     RC3: 34 Fp-r:  42     RC4: 34 Fs:  42     RC6: 43 FBS-r:  58     RC7: 38 RBS:  59     RC8: 39 L-r:  52     RC9: 28 K-r:  62

## 2019-12-10 NOTE — PROGRESS NOTES
NAME: Lynnette Mata  MRN: [id number]  : 1935  RAMOS: 11/15/2019  Neuropsychology Laboratory  [address]  [phone]    NEUROPSYCHOLOGICAL EVALUATION    RELEVANT HISTORY AND REASON FOR REFERRAL    This is a report of neuropsychological consultation regarding Lynnette Mata, an 84-year-old, right-handed woman with 18+ years of formal education. She has been seeing a health psychologist, Dr. Cass Mcpherson, for mood and anxiety symptoms surrounding perceived cognitive changes. She is described as feeling foggy and having less confidence in her mental abilities. She is referred by Dr. Mcpherson for neuropsychological evaluation of brain function, to better determine if her self-perceived cognitive issues are substantiated or more likely to be sequelae of depression.    In today s interview, Ms. Mata tells me that cognitive concerns are not her primary issue. What she is most concerned about is an ostensibly physical sensation. She says there is a sense that she has something like a tight rubber band constricting her head, and this is associated with a fogginess in her mind. She states that the band around her head feels real to her and is not imagined or metaphorical. This sense has been present for about the last three months. In the morning, she feels okay or generally normal. The sensation grows over the morning. She feels some sense of relief from it in the evening.    I do not see any cognitive screenings in the medical records available to me. She has never had a stroke. She denies any history of seizure. She has never had a TBI. She has no concerns about classical migraine. She says she has not had any falls, but she has started using a trekking pole over the last year. She did have a workup including head CT (only finding was mild parenchymal atrophy) after falling from a standing position two years ago. She describes some anxiety-driven  tremulousness that comes and goes. There are no suggestions of parkinsonism. She denies any hallucinatory experiences. She denies any changes to her senses of smell, taste, or hearing. She thinks she may be a candidate for cataract corrections.    Ms. Mata describes herself as exceptionally depressed right now. She has not cut out socializing, but she feels less engaged with her friends and their activities. She says she no longer feels much like living. She denies any suicidal planning or actions. She describes periods of shifting between high energy and low energy states. In the low energy states, she says she becomes almost completely nonverbal. In the high energy states, she is intensely verbal. She says she has never taken any psychiatric medications, because she always felt they would take too long to operate and her fluctuations are usually a matter of days instead of weeks. Aside from current sessions with Dr. Mcpherson, she says she participated in some psychotherapy at least 20 or more years ago.    Her sleep is not particularly good. She lies awake thinking about things and feeling generally keyed up. She was prescribed trazodone last year but she stopped it because of perceived side effects. She has tried melatonin but it has not been helpful. She is thinking of trying an increased dose of it.    She reports very little alcohol consumption and no history of alcohol problems. She does not use tobacco or illicit drugs.    Family history includes death by suicide for her father, attempted suicide for her brother, and a diagnosis of bipolar disorder for her brother. A distant cousin on her father s side committed suicide. There is no known family history of cognitive disorders or neurologic diseases.    Ms. Mata was  two years ago. Her   with a condition of parkinsonism plus dementia. She does not have any children. She has lived on her own for the last two years and remains fully  independent for basic and instrumental ADLs. She moved out of her longtime home this past spring. Switching to a senior living facility has made her depression worse.    There is no known history of early life medical complications, developmental trajectory abnormalities, or academic delays. She always felt academically above average. She earned a Bachelor s degree in English and then two Master s degrees, one in library science and one in English. 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 Her primary career was as a  at [employer] at [employer]. She retired in 2003.    BEHAVIORAL OBSERVATIONS    Ms. Mata was polite and cooperative with the evaluation. Mood was markedly dysphoric and at least mildly tense. She was anergic but not somnolent. Attentional control was mildly variable. She was slightly slowed in her approach to most tasks and could be a little careless or impulsive at times. There were no overtly aphasic qualities to her speech production. Language comprehension was normal. There were no observable tremors or gait abnormalities (she did have a walking stick). She clearly doubted herself and engaged in substantial negative self-talk. Her motivation to participate in the evaluation was a little bit variable, but her overall effort and persistence appeared to be good. The test results are seen as valid estimations of her cognitive status.    MEASURES ADMINISTERED    The following measures were administered by a trained psychometrist, under my direct supervision:    Orientation: Time, Place, Basic Personal Information, Recent [place] Presidents; Wide Range Achievement Test 4: Word Reading, Math Computation; Wechsler Adult Intelligence Scale-IV: Similarities, Block Design, Digit Span, Coding; Controlled Oral Word Association Test; Animal Naming Test; [place] Naming Test; Token Test; Melisa Visual Acuity Screen; Clock Drawing; Finger Tapping; Grooved Pegboard;  Trail Making Test; Stroop Test; Porteus Maze Test; [place] Verbal Learning Test, Revised; Brief Visuospatial Memory Test, Revised; Wechsler Memory Scale-IV: Logical Memory, Visual Reproduction; [place] Multiphasic Personality Xcomocant-7-DG.    RESULTS AND INTERPRETATION    Orientation: Orientation was normal for time, place, basic personal information, and basic cultural information.    Intellectual Abilities: Performance on a reading and pronunciation test that is validated for estimating premorbid intelligence was in the very superior range. On measures of current intellectual functioning, abstract verbal analogical reasoning was high average and nonverbal reasoning through hands-on object assembly was middle average.    Language & Related Skills: As noted above, basic reading and pronunciation skills were very superior. Oral comprehension and execution of commands was fully intact. Confrontation naming was normal. Semantic verbal fluency was in the borderline range. Letter-based verbal fluency was average.    Visual Perceptual & Constructional Skills: Binocular, corrected, near-point visual acuity was 20/30 on Melisa screening. Clock drawing was normal. Drawings of simple shapes and figures were appropriately organized and easily recognizable as the target images.    Motor Skills: Speeded finger tapping performances were upper average with the dominant right hand and middle average with the nondominant left hand. An opposite pattern was seen on a measure of fine-motor dexterity for placing shaped pegs into holes. Performance with the dominant right hand was low average, whereas the nondominant left hand was middle average.    Mental Speed & Executive Functioning: As noted above, speeded verbal fluency performances ranged from borderline impaired to middle average. Speeded word reading was high average. Speeded color naming was high average. Color naming under executive demands to inhibit prepotent  responding was high average. Graphomotor clerical speed was average. Visual scanning and graphomotor sequencing under simple conditions was average for speed and had no errors. Scanning and sequencing under greater executive demands to control divided attention remained average for speed and error-free. Planning, foresight, and learning from errors were normal on an unspeeded maze-solving test.    Attention & Working Memory: Immediate auditory attention and working memory were average for repeating and rearranging digit strings.    Learning & Anterograde Memory: Immediate verbal memory for short stories was average. Delayed story recall was average, and recognition of story details was average. Learning a word list over repeated readings was in the average range. Delayed free recall of the list was average, and recognition of the list was average. Learning a display of simple geometric shapes over repeated viewings was average. Delayed recall of the display was average, and recognition was average. On another test, immediate visual memory for simple shapes viewed only one time was upper average. Delayed free recall of the shapes was average, and recognition was high average.    Emotional, Behavioral, & Personality Functioning: Her response set to a lengthy questionnaire for objective assessment of personality functioning and emotional coping patterns (MMPI-2-RF) was technically valid and interpretable, with no significant indications for fixed responding, random responding, symptom exaggeration, or overt defensiveness. There were borderline issues regarding overly favorable self-presentation. There was one significant elevation among the nine core clinical scales, and one clinical scale was remarkably low. The elevated scale suggested the presence of clinically significant depression and isolative withdrawal. The remarkably low scale suggested a highly atypical lack of activation and engagement with her environment,  contraindicating the presence of a manic/hypomanic state and most likely reflecting vegetative and isolative symptoms of depression. Among the various supplemental scales, there was only one elevated scale suggesting increased risk for suicidal ideation.    IMPRESSIONS    I would characterize the cognitive test results as barely atypical. There is a single test performance (semantic verbal fluency) that is in the borderline impaired range. All other test performances, including those on other verbal fluency measures, are in the average to high average ranges. A single low performance among a battery of tests is a common finding among neurologically intact individuals and does not suggest the presence of a cognitive disorder. Psychometric estimation and her history of academic achievement suggests a strongly above average baseline. The majority of today s performances are in the middle average range, so it is possible that she is operating mildly below her premorbid levels. Nonetheless, her performances are at or better than normal expectations for her age range. I would not diagnose a cognitive disorder on the basis of today s data.    There is a family history of bipolar disorders and suicidality. She describes fluctuating periods of energy levels and mental functioning suggestive of a bipolar-spectrum disorder. There is no clear history of jian wero; bipolar II or cyclothymia seems more likely. She is currently in a depressed state with reduced energy. She reports passive suicidal ideation. She has declined prior offers for most psychoactive medications, and she discontinued use of trazodone because of perceived side effects. Her sleep is chronically poor because of ruminative worry or agitation. These factors are likely to explain any presenting concerns regarding cognition.    However, Ms. Mata tells me she is not particularly concerned about her cognition and her most pressing problem is this unusual sense  of tightness around her head. Physical manifestation of emotional distress is possible, though it is worth noting that her MMPI-2-RF profile does not suggest strong somatization tendencies. Physical assessment of head pain is not in my scope of practice. I cannot opine on the etiology of her concerns in these regards and must defer to evaluations by relevant providers.    RECOMMENDATIONS    Continued mental health treatment is needed; more aggressive approaches should be explored. She should be encouraged to reconsider her view on pharmacologic approaches. Continued engagement with a psychotherapist is appropriate.    I would encourage referral to a neurologist or physiatrist specializing in headaches. Here at the [facility], I might recommend DrsVasile Felix, Josephine Locke, and Ariadne Tom.    A neuropsychological baseline has been established. Acquired or encroaching neurodegenerative syndromes cannot be ruled out entirely at this time but key indicators are not clearly present. I or my colleagues would always be happy to see her for reevaluation, if there is clinical suspicion for significant change.    Aguila Vasquez, PhD, LP, ABPP-CN  Board Certified in Clinical Neuropsychology  Licensed Psychologist [id number]     Department of Rehabilitation Medicine  Division of Adult Neuropsychology  [facility]      Time spent: One hour neurobehavioral status exam including interview, clinical assessment by licensed and board-certified neuropsychologist (CPT 51473). One hour neuropsychological testing evaluation by licensed and board-certified neuropsychologist, including integration of patient data, interpretation of standardized test results and clinical data, clinical decision-making, treatment planning, report, and interactive feedback to the patient, first hour (CPT 07373). One hour of neuropsychological testing evaluation by licensed and board-certified neuropsychologist, including  integration of patient data, interpretation of standardized test results and clinical data, clinical decision-making, treatment planning, report, and interactive feedback to the patient, subsequent hours (CPT 85825). 30 minutes of psychological and neuropsychological test administration and scoring by technician, first 30 minutes (CPT 19948). 142 minutes psychological or neuropsychological test administration and scoring by technician, subsequent 30-minute intervals (CPT 24836). Diagnoses: R41.3, F39

## 2019-12-11 RX ORDER — LISINOPRIL 10 MG/1
10 TABLET ORAL DAILY
Qty: 90 TABLET | Refills: 1 | Status: SHIPPED | OUTPATIENT
Start: 2019-12-11 | End: 2020-06-12

## 2019-12-11 NOTE — TELEPHONE ENCOUNTER
lisinopril (PRINIVIL/ZESTRIL) 10 MG tablet  Last Written Prescription Date:  10/22/18  Last Fill Quantity: 90,   # refills: 3  Last Office Visit : 11/18/19  Future Office visit: 12/16/19    Refill to pharmacy.

## 2019-12-16 ENCOUNTER — OFFICE VISIT (OUTPATIENT)
Dept: INTERNAL MEDICINE | Facility: CLINIC | Age: 84
End: 2019-12-16
Payer: MEDICARE

## 2019-12-16 VITALS
WEIGHT: 134.9 LBS | BODY MASS INDEX: 22.45 KG/M2 | HEART RATE: 77 BPM | DIASTOLIC BLOOD PRESSURE: 71 MMHG | RESPIRATION RATE: 18 BRPM | SYSTOLIC BLOOD PRESSURE: 145 MMHG

## 2019-12-16 DIAGNOSIS — F32.1 MODERATE MAJOR DEPRESSION (H): ICD-10-CM

## 2019-12-16 DIAGNOSIS — F33.1 MODERATE EPISODE OF RECURRENT MAJOR DEPRESSIVE DISORDER (H): Primary | ICD-10-CM

## 2019-12-16 ASSESSMENT — ANXIETY QUESTIONNAIRES
6. BECOMING EASILY ANNOYED OR IRRITABLE: NOT AT ALL
3. WORRYING TOO MUCH ABOUT DIFFERENT THINGS: MORE THAN HALF THE DAYS
GAD7 TOTAL SCORE: 7
5. BEING SO RESTLESS THAT IT IS HARD TO SIT STILL: NOT AT ALL
2. NOT BEING ABLE TO STOP OR CONTROL WORRYING: MORE THAN HALF THE DAYS
IF YOU CHECKED OFF ANY PROBLEMS ON THIS QUESTIONNAIRE, HOW DIFFICULT HAVE THESE PROBLEMS MADE IT FOR YOU TO DO YOUR WORK, TAKE CARE OF THINGS AT HOME, OR GET ALONG WITH OTHER PEOPLE: VERY DIFFICULT
7. FEELING AFRAID AS IF SOMETHING AWFUL MIGHT HAPPEN: NOT AT ALL
1. FEELING NERVOUS, ANXIOUS, OR ON EDGE: MORE THAN HALF THE DAYS

## 2019-12-16 ASSESSMENT — PATIENT HEALTH QUESTIONNAIRE - PHQ9
SUM OF ALL RESPONSES TO PHQ QUESTIONS 1-9: 14
5. POOR APPETITE OR OVEREATING: SEVERAL DAYS

## 2019-12-16 ASSESSMENT — PAIN SCALES - GENERAL: PAINLEVEL: NO PAIN (0)

## 2019-12-16 NOTE — PATIENT INSTRUCTIONS
[facility] Medication Refill Request Information:  * Please contact your pharmacy regarding ANY request for medication refills.  ** [facility] Prescription Fax = [phone]  * Please allow 3 business days for routine medication refills.  * Please allow 5 business days for controlled substance medication refills.     [facility] Test Result notification information:  *You will be notified with in 7-10 days of your appointment day regarding the results of your test.  If you are on MyChart you will be notified as soon as the provider has reviewed the results and signed off on them.    [facility] [phone]

## 2019-12-16 NOTE — NURSING NOTE
Chief Complaint   Patient presents with     Depression     Patient is here for 1 month follow up       Blake Rosas CMA ([facility]) at 2:47 PM on 12/16/2019

## 2019-12-16 NOTE — PROGRESS NOTES
S) Ms. Coleman is seen for f/u of depression/anxiety. Severe symptoms limit all josé miguel in life. No response to 10 mg escitalopram daily. It's been a month- would expect to see something. She is worrying that she will never be happy again and want to engage in social activities with friends and family. Dark thoughts but no suicidal ideation/plan. PHQ-9 score 14 and RUDI 7 score 7.  O) BP (!) 145/71   Pulse 77   Resp 18   Wt 61.2 kg (134 lb 14.4 oz)   BMI 22.45 kg/m     WEll appearing  Well groomed and dressed  Cor RRR   Lungs clear  Abd soft  No LE edema    A/P) 1) Depression. I'd like to get her in to see Ramón Lebron in Psychiatry for consideration of med options for her given ongoing severe symptoms. Health psychology referral and Psychiatry referral.     Over 15 min of 25 min visit spent in care coordination and counseling related to the above issues.    Nickolas Davenport MD, FACP, FAAP

## 2019-12-17 ENCOUNTER — HOSPITAL ENCOUNTER (OUTPATIENT)
Dept: PHYSICAL THERAPY | Facility: CLINIC | Age: 84
Setting detail: THERAPIES SERIES
End: 2019-12-17
Attending: PHYSICAL MEDICINE & REHABILITATION
Payer: MEDICARE

## 2019-12-17 PROCEDURE — 97110 THERAPEUTIC EXERCISES: CPT | Mod: GP | Performed by: PHYSICAL THERAPIST

## 2019-12-17 PROCEDURE — 97112 NEUROMUSCULAR REEDUCATION: CPT | Mod: GP | Performed by: PHYSICAL THERAPIST

## 2019-12-17 ASSESSMENT — ANXIETY QUESTIONNAIRES: GAD7 TOTAL SCORE: 7

## 2019-12-19 ENCOUNTER — TELEPHONE (OUTPATIENT)
Dept: INTERNAL MEDICINE | Facility: CLINIC | Age: 84
End: 2019-12-19

## 2019-12-19 DIAGNOSIS — F32.1 MODERATE MAJOR DEPRESSION (H): Primary | ICD-10-CM

## 2019-12-19 DIAGNOSIS — F33.1 MODERATE EPISODE OF RECURRENT MAJOR DEPRESSIVE DISORDER (H): ICD-10-CM

## 2020-01-02 ENCOUNTER — TELEPHONE (OUTPATIENT)
Dept: INTERNAL MEDICINE | Facility: CLINIC | Age: 85
End: 2020-01-02

## 2020-01-02 NOTE — TELEPHONE ENCOUNTER
[facility] Call Center    Phone Message    May a detailed message be left on voicemail: yes    Reason for Call: Other: Pt called and stated that she is having the symptom of frequent urination. she is curious if there is any over the counter medications she could take to help this. pt would also like to inform Dr. Davenport that she did double her dose of Lexapro about a week ago. she also stated that she knows Dr. Davenport advised her to get a mental health eval and to look into group therapy, but she states she is feeling better and is not going to do that. please call pt back to discuss this      Action Taken: Message routed to:  [facility] JESSICA

## 2020-01-09 ENCOUNTER — TELEPHONE (OUTPATIENT)
Dept: INTERNAL MEDICINE | Facility: CLINIC | Age: 85
End: 2020-01-09

## 2020-01-09 NOTE — TELEPHONE ENCOUNTER
"Spoke to patient who states that she did not do her \"mental test\" or the day care because she does not want to be stuck somewhere for 8-10 hours a day, as she knows some people that have had to do that.     Patient states that she is feeling better and that she is not sure she needs to see Ramón Lebron any more. Advised the patient that she should keep that appointment to follow up on her medication. Also relayed that typically people will see Ramón Lebron a couple of times and then are referred back to their PCP. Patient stated that she only needed to see Dr. Lebron once.     Patient states that she wanted to let Dr. Davenport know that she did double her dose and has been taking two tablets. Clarified with the patient that she had 10 mg tablets and patient stated \"yes, two of the 10's. So I am not sure how that will work with insurance and the pharmacy, but I will run out before my appointment in February.\"     Reviewed patients chart with RN and there was no mention of the patient doubling her dose to go up to 20 mg. Patient did call the clinic on 1/2/2020 and relayed that she had doubled her dose.     Advised with RN and [facility] PHARM-D if it was safe for the patient to go back down to 10 mg daily. Who stated that it would be ok for the patient to go back down to 10 mg without a titration and that she may experience dizziness and headaches.     Relayed to patient that she should go back down to 10 mg and if she experiences any symptoms or changes to call the clinic. Also advised that patient to keep her appointment with Dr. Lebron in Feb to be able to discuss how the 10 mg is working. Patient stated verbal understanding. Patient may possibly need an early refill on her medication, as she was taking 2 tablets (20mg) for about 2-2 1/2 weeks. Love Saab LPN 1/9/2020 3:46 PM        "

## 2020-01-09 NOTE — TELEPHONE ENCOUNTER
"[facility] Call Center    Phone Message    May a detailed message be left on voicemail: yes    Reason for Call: Other: PT is calling to state that she is feeling \"normal\".  She cancelled her mental test and refuses to go to a \"day care\" for people suffering from depression.  PT also states that the has an appt. coming up with  Claus Martinez and she is wondering if she should cancel that.  PT is also wondering if she should cut back on her RX for escitalopram (LEXAPRO) 10 MG tablet.  Please follow up with the PT.    Action Taken: Message routed to:  [facility] PCC  "

## 2020-01-17 DIAGNOSIS — F32.1 MODERATE MAJOR DEPRESSION (H): ICD-10-CM

## 2020-01-17 RX ORDER — ESCITALOPRAM OXALATE 10 MG/1
10 TABLET ORAL DAILY
Qty: 90 TABLET | Refills: 3 | Status: CANCELLED | OUTPATIENT
Start: 2020-01-17

## 2020-01-17 NOTE — TELEPHONE ENCOUNTER
Patient was contacted because in November a years worth of refills was sent to the pharmacy. She statmes that for about 2 weeks she was taking 2 tabs daily instead of one. She then went back to one tab daily at the advice of psychiatry so she is now out of pills.    Pharmacy was called and it is too early by about 2 weeks for her to refill the medication.    Will need to have an override order by physician to fill it.    Kathleen M Doege RN

## 2020-01-17 NOTE — TELEPHONE ENCOUNTER
Health Call Center    Phone Message    May a detailed message be left on voicemail: yes    Reason for Call: Medication Refill Request    Has the patient contacted the pharmacy for the refill? Yes   Name of medication being requested: escitalopram (LEXAPRO) 10 MG tablet  Provider who prescribed the medication: Dr. Davenport  Pharmacy: [id number] [place], [place] - [address]  Date medication is needed: 1/20/2020. Patient states she needs about 10 tablets to get her through to her appointment on 2/4         Action Taken: Message routed to:  [facility] SAL PCC

## 2020-01-17 NOTE — TELEPHONE ENCOUNTER
I called the pharmacy and spoke with staff. Per staff, patient can received a refill of escitalopram on 1/20/20. No override possible. Patient can  2-3 tablets out of pocket if needed, though will cost ~$20.    I called Lynnette to discuss. Per note from the call center, patient will need refill on 1/20/20. Per the pharmacy, patient's insurance will allow refill on 1/20/20. I will discuss further with Lynnette when a callback is received.    Virginie Lopez) LAURA Sheehan

## 2020-01-21 ENCOUNTER — HOSPITAL ENCOUNTER (OUTPATIENT)
Dept: PHYSICAL THERAPY | Facility: CLINIC | Age: 85
Setting detail: THERAPIES SERIES
End: 2020-01-21
Attending: PHYSICAL MEDICINE & REHABILITATION
Payer: MEDICARE

## 2020-01-21 PROCEDURE — 97110 THERAPEUTIC EXERCISES: CPT | Mod: GP | Performed by: PHYSICAL THERAPIST

## 2020-01-21 NOTE — PROGRESS NOTES
"Outpatient Physical Therapy Discharge Note     Patient: Lynnette Mata  : 1935    Beginning/End Dates of Reporting Period:  9/3/2019 to 2020    Referring Provider: Ariadne Tom MD    Therapy Diagnosis: Impaired functional mobility, gait, balance, endurance.     Client Self Report: Feeling much better emotionally and cognitively. \"I've gotten connected to the program at [facility], twice a week for 45 minutes per time.\" Sessions are full body strengthening or balance. Pt also has access to a NuStep. Pt states she would like to be reassessed today and possibly consider discharge since she feels she can manage her condition using the classes available to her at [facility].     Objective Measurements:  Objective Measure: TUG   Details: 13.5 sec with trekking pole    Goals:  Goal Identifier TUG   Goal Description Pt will complete the TUG in less than 13.5 seconds while using least restrictive assistive device in order to reduce her falls risk.   Target Date 20   Date Met  20   Progress: MET     Goal Identifier Gait speed   Goal Description Pt will complete the 25-foot walk test in less than 10 seconds with least restrictive assistive device to improve her efficiency and safety of walking at home and in the community.   Target Date 19   Date Met  19   Progress: MET     Goal Identifier Dynamic balance   Goal Description Pt will demonstrate improved dynamic balance as measured by an FGA score of at least 15/30, in order to improve her safety and reduce falls risk.   Target Date 19   Date Met  19   Progress: MET     Goal Identifier HEP   Goal Description Pt will demonstrate independent performance of her home exercise program to maximize her ability to manage her condition and promote optimal wellness.   Target Date 20   Date Met  20   Progress: MET     Goal Identifier Exercise accountability   Goal Description Pt will report having made 2 phone calls to " either a friend or to her sister to report her adherence on her HEP.    Target Date 12/01/19   Date Met  11/26/19   Progress: MET       Progress Toward Goals:   Progress this reporting period: All goals met as of today's session.     Plan:  Discharge from therapy.    Discharge:    Reason for Discharge: Patient has met all goals.    Equipment Issued: None    Discharge Plan: Patient to continue home program/exercise classes at [facility].

## 2020-02-04 ENCOUNTER — OFFICE VISIT (OUTPATIENT)
Dept: BEHAVIORAL HEALTH | Facility: CLINIC | Age: 85
End: 2020-02-04
Attending: INTERNAL MEDICINE
Payer: MEDICARE

## 2020-02-04 VITALS
DIASTOLIC BLOOD PRESSURE: 75 MMHG | BODY MASS INDEX: 23.4 KG/M2 | HEART RATE: 56 BPM | WEIGHT: 140.6 LBS | SYSTOLIC BLOOD PRESSURE: 146 MMHG

## 2020-02-04 DIAGNOSIS — F39 MOOD DISORDER (H): Primary | ICD-10-CM

## 2020-02-04 NOTE — Clinical Note
Francisco Kat, sorry about the delay on this write up. Lynnette seemed to be doing better, no changes needed, but if mood worsens, consider the options in the note. Feel free to contact me if there are any other questions or concerns.

## 2020-02-04 NOTE — PROGRESS NOTES
"   Psychiatric Outpatient Medication Management Consultation   Lynnette Mata is a 84 year old female who was referred for consultation by Nickolas Davenport for evaluation of depression and anxiety on 20.   History was provided by the patient who was a good historian.      Chief Complaint    \" Sleep has been hard lately \"      History of Present Illness    Pertinent Background: Notes a long history of intermittent depression with stretches of euthymia in between. Has had episodes in the past of elevated mood and energy for weeks at a time. Does note that sleep has not generally been significantly affected during these times. No impulsive behaviors at these times, but definitely hyperverbal. Has noted elevated mood at these times.   Lynnette notes that she has gone through episodes of depression and has gone through episodes of this throughout her life, maybe every 2 years or so, and not related to the season. Her   almost 3 years ago in 2017. She notes that she didn't have depression after that time, but then suddenly in  she entered another depressive episode, and just started reading mystery novels until it passed.   Most Recent History: This last year she moved into senior housing, and found that her sleep was very disrupted. This lasted for about 2-3 months during which time she felt very hyper. After this period, she felt like things crashed in terms of mood. She felt like she lost confidence with things like driving, and felt like there was just this band around her head, like a fog that she was in. She decided to get seen about this in November and was started on Lexapro, but didn't really feel like it helped for a month or so, so she just continued on reading her murder mysteries.   Overall she does feel that after roughly 6 weeks on the medication, things got a lot better, and now she has been feeling better, just a lot more like herself again.   Sleep has been going well. Does " get worse when depression is worse, hard to know if this is the cause or the effect.     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   LIVING SITUATION / RELATIONSHIPS-  passed away 2017. She likes the senior housing where she is living now for the last year, but does feel a little more active and cognitively engaged than other residents.   SOCIAL/ SPIRITUAL SUPPORT- Yes  FEELS SAFE AT HOME- Yes     Medical Review of Systems    No dizziness, but does have some leg weakness at times, PT has helped. No headaches or GI issues.   A comprehensive review of systems was performed and is negative other than noted in the HPI.     Past Psychiatric History    SIB [method, most recent]- None  Suicide Attempt [#, recent, method]- None  Suicidal Ideation Hx [passive, active]- None    Psychosis Hx- None  Psych Hosp [ #, most recent, committed]- None  ECT [#, most recent]- None    Eating Disorder- None    Outpatient Programs [ DBT, Day Treatment, Eating Disorder Tx etc]- None     Psychiatric Medication Trials       Drug /  Start Date Dose (mg) Helpful Adverse Effects DC Reason / Date   Lexapro 2019 20 yes     Trazodone 2018 50 no     melatonin          Social History                [per patient report]   Financial Support- See above  Living Situation/Family/Relationships- See above. Got  at age 36, and was  until her  passed away when she was 21.  was a  at [employer].   Social/Spiritual Support- See above  Trauma History (self-report)- None  Legal- None  Early History/Education- Born in [place], [place], raised in [place]. One younger brother,  about 3 years ago, sister is 5 years younger.     Family  History - Father with depression,  completed suicide. Brother attempted twice, had bipolar disorder. Mother had some psychosis (paranoia) around the time of menopause.      Past Medical History      CARE TEAM:   PCP- Nickolas Davenport  Therapist- Previously seen Cass Mcpherson with [facility].     Neurologic Hx [head injury, seizures, etc.]: None  Patient Active Problem List   Diagnosis     Essential hypertension, benign     Post-menopause     Cervical stenosis (uterine cervix)     Knee pain, bilateral     Encounter for routine gynecological examination     Primary osteoarthritis of both knees     Genital atrophy of female     ACP (advance care planning)     Abnormal cardiovascular stress test     Neurogenic claudication     Past Medical History:   Diagnosis Date     Asymptomatic menopausal state age 45    Hx HRT use x 6+ years--off with [facility] study findings     Shingles outbreak 2/14/2013    Despite immunization at age 72!        Stricture and stenosis of cervix      Unspecified constipation     hx chronic      Allergies    Patient has no known allergies.     Medications      Current Outpatient Medications   Medication Sig Dispense Refill     aspirin 81 MG chewable tablet Take 81 mg by mouth daily.       Calcium Carbonate (CALCIUM 600 PO) Take  by mouth daily.       escitalopram (LEXAPRO) 10 MG tablet Take 1 tablet (10 mg) by mouth daily 90 tablet 3     fish oil-omega-3 fatty acids (FISH OIL) 1000 MG capsule Take 2 g by mouth daily.       Glucosamine 500 MG CAPS Take  by mouth daily.       lisinopril (PRINIVIL/ZESTRIL) 10 MG tablet Take 1 tablet (10 mg) by mouth daily 90 tablet 1     Multiple Vitamin (MULTI-VITAMIN) per tablet Take 1 tablet by mouth daily.       simvastatin (ZOCOR) 20 MG tablet Take 1 tablet (20 mg) by mouth daily 90 tablet 2      Physical Exam  (Vitals Only)   BP (!) 146/75 (BP Location: Right arm, Patient Position: Sitting, Cuff Size: Adult Regular)   Pulse 56   Wt 63.8 kg (140 lb 9.6 oz)   " BMI 23.40 kg/m      Pulse Readings from Last 5 Encounters:   02/04/20 56   12/16/19 77   11/18/19 82   07/16/19 118   06/29/19 94     Wt Readings from Last 5 Encounters:   02/04/20 63.8 kg (140 lb 9.6 oz)   12/16/19 61.2 kg (134 lb 14.4 oz)   11/18/19 61.2 kg (135 lb)   08/30/19 64 kg (141 lb)   08/22/19 64 kg (141 lb)     BP Readings from Last 5 Encounters:   02/04/20 (!) 146/75   12/16/19 (!) 145/71   11/18/19 135/67   07/16/19 157/76   06/29/19 121/67      Mental Status Exam   Alertness: alert  and oriented  Appearance: adequately groomed  Behavior/Demeanor: cooperative, pleasant and calm, with good eye contact   Speech: normal and regular rate and rhythm  Language: intact and no problems  Psychomotor: normal or unremarkable  Mood: \"Doing a little better\"  Affect: full range and appropriate; was congruent to mood; was congruent to content  Thought Process/Associations: unremarkable  Thought Content:  Reports none;  Denies suicidal ideation, violent ideation and delusions  Perception:  Reports none;  Denies auditory hallucinations and visual hallucinations  Insight: intact  Judgment: intact  Cognition: does  appear grossly intact; formal cognitive testing was not done  Gait and Station: unremarkable     Labs and Data      PHQ-9 SCORE 8/3/2018 8/28/2018 12/16/2019   PHQ-9 Total Score 12 7 14     RUDI-7 SCORE 8/3/2018 8/28/2018 12/16/2019   Total Score 11 10 7       Recent Labs   Lab Test 06/26/19  1037 08/03/18  0954 08/03/17  0941   CR 0.74 0.71 0.70   GFRESTIMATED 75 78 81     Recent Labs   Lab Test 06/26/19  1037 08/03/18  0954   AST 20 21   ALT 32 32   ALKPHOS 37* 38*     Recent Labs   Lab Test 08/03/18  0954 08/03/17  0941 07/20/16  1352 07/09/15  0922   TSH 2.20 2.93 1.53 2.58   T4 1.12  --   --   --      ECG 6/26/19 QTc = 446ms     Assessment & Plan    Lynnette Mata is a 84 year old female who provides a history supporting the following diagnoses:  Mood disorder (R/o bipolar 2 disorder vs depressive " disorder vs adjustment disorder)    Pertinent History: Lynnette notes long history of intermittent depressive symptoms. She has also had periods of elevated mood and energy in the past, although more in depth assessment would be needed to verify whether this represents true hypomania. She tends to cope with her depression by reading books and finds this to be helpful.   TODAY: Recently Lynnette moved into senior living. She notes that sleep has been more difficult since this time. She was started on Lexapro in November, and did not that things seemed improve after she had been on it for about 6 weeks. She does feel a little more like herself now.   Psychotherapy: Primary recommendation for the treatment of mood symptoms, especially in the context of significant life changes. She would benefit from extra support for processing changes and stressors, and aid for developing coping skills.   Pharmacotherapy: Given the improvement that Lynnette has experienced with Lexapro, no further changes are needed at this time.   If side effects are problematic, or if more dose flexibility is needed, consider sertraline as an alternative SSRI.   If mood cycling or mood instability occurs while taking Lexapro, consider replacing with or adding lamotrigine as a mood stabilizer. Would have to follow titration schedule per UpToDate and emphasize the importance of compliance to minimize risk of reactions / SJS.     PSYCHOTROPIC DRUG INTERACTIONS: Concurrent use of SSRIs and NSAIDs may increase risk of bleeding.    MANAGEMENT:  Monitoring for adverse effects and routine vitals     Plan     1) PSYCHOTROPIC MEDICATIONS:  - Continue escitalopram 10mg daily   - If more dose flexibility is needed, consider sertraline as an alternative   - If mood cycling occurs while on escitalopram, consider lamotrigine as a mild mood stabilizing option    [see the following SmartPhrase(s) for more information: PSYMEDINFOESCITALOPRAM - PSYMEDINFOSERTRALINE -  PSYMEDINFOLAMOTRIGINE]    2) THERAPY: Psychotherapy is a primary recommendation of the treatment of mood symptoms. Patient may be advised to contact the customer care number on their insurance card for a geographically convenient provider if needed.     3) NEXT DUE:   Labs- Routine lab monitoring is not indicated for current psychotropic medication regimen  ECG- N/A   Rating Scales- N/A    4) REFERRALS: None    5) : None    6) RTC: Follow up with PCP    Treatment Risk Statement:  The patient understands the risks, benefits, adverse effects and alternatives. Agrees to treatment with the capacity to do so. No medical contraindications to treatment. Agrees to call clinic for any problems. The patient understands to call 911 or go to the nearest ED if life threatening or urgent symptoms occur. Crisis numbers are provided routinely in the After Visit Summary.       PROVIDER:  Claus Lebron MD

## 2020-02-04 NOTE — PATIENT INSTRUCTIONS
May stay on Lexapro 10mg for as long as you'd like. I would recommend 6-12 months from the point of desired resolution of symptoms. This is based on the idea that the longer you are stable, the more likely you are to remain stable in terms of symptoms.   After that point, may discontinue the medication.     If symptoms recur, may restart the medication at 10mg.   10mg is the recommended max dose for escitalopram (Lexapro) in patients over age 70.   If more dose flexibility is required, would consider an alternative like sertraline.     If you experience mood cycling while taking an SSRI (like escitalopram or sertraline), I would recommend switching to a mild mood stabilizer like lamotrigine.         Contact Us: Please call [phone] during business hours (8-5:00 M-F).  If after clinic hours, or on the weekend, please call  [phone].    Financial Assistance [phone]  [facility] Billing [phone]  [facility] Billing [phone]  Medical Records [phone]    MENTAL HEALTH CRISIS NUMBERS:  [facility]:  [facility] - [phone]  Crisis Residence [facility] - [phone]  Walk-In Counseling [facility] [phone]  COPE 24/7 [place] Mobile Team - [[phone]]  Crisis Connection - [phone]    [facility] - [phone]  Walk-in counseling [facility] - [phone]  Walk-in counseling [facility] - [phone]  Crisis Residence [facility] - [phone]  Urgent Care Adult Mental Health:   --Drop-in, 24/7 crisis line, and [facility] Mobile Team [[phone]]    24/7 CRISIS TEXT LINE: www.crisistextline.org OR text 914-728 anywhere, anytime, any crisis    Poison Control Center - 1-648.283.3540  [employer] [facility] - 8-388-077-8170; or [employer] Lifeline - 1-489.383.8931    If you have a medical emergency please call 911 or go to the nearest ER.

## 2020-02-04 NOTE — NURSING NOTE
Chief Complaint   Patient presents with     Consult     MDD     Would like out of today's visit:  Consultation for depression  Leonie Taylor LPN

## 2020-02-10 ENCOUNTER — HEALTH MAINTENANCE LETTER (OUTPATIENT)
Age: 85
End: 2020-02-10

## 2020-03-09 ENCOUNTER — ALLIED HEALTH/NURSE VISIT (OUTPATIENT)
Dept: INTERNAL MEDICINE | Facility: CLINIC | Age: 85
End: 2020-03-09
Payer: MEDICARE

## 2020-03-09 DIAGNOSIS — H61.23 BILATERAL IMPACTED CERUMEN: Primary | ICD-10-CM

## 2020-03-09 NOTE — NURSING NOTE
Lynnette Mata comes into clinic today at the request of Dr. Davenport Ordering Provider for an ear wash.      EAR WASH    An ear wash was performed by myself while the patient was in clinic.   The patient tolerated this procedure well and had no complications.    A large amount of impacted cerumen was removed from both ear/ears.    Tympanic membrane was visible.     JENNY Belcher at 1:52 PM on 3/9/2020.         This service provided today was under the supervising provider of the day Dr. Orellana, who was available if needed.    JENNY Belcher at 1:52 PM on 3/9/2020.

## 2020-06-10 DIAGNOSIS — R26.89 BALANCE PROBLEMS: ICD-10-CM

## 2020-06-10 DIAGNOSIS — Z91.81 PERSONAL HISTORY OF FALL: ICD-10-CM

## 2020-06-10 DIAGNOSIS — I10 ESSENTIAL HYPERTENSION, BENIGN: ICD-10-CM

## 2020-06-10 DIAGNOSIS — M62.81 GENERALIZED MUSCLE WEAKNESS: ICD-10-CM

## 2020-06-12 RX ORDER — LISINOPRIL 10 MG/1
TABLET ORAL
Qty: 90 TABLET | Refills: 0 | Status: SHIPPED | OUTPATIENT
Start: 2020-06-12 | End: 2020-09-13

## 2020-06-12 NOTE — TELEPHONE ENCOUNTER
Last Clinic Visit: 12/16/2019  [facility]  BP above protocol range- FYI to clinic RN  RF 90 day

## 2020-06-28 DIAGNOSIS — E78.01 FAMILIAL HYPERCHOLESTEROLEMIA: ICD-10-CM

## 2020-06-28 DIAGNOSIS — R07.89 ATYPICAL CHEST PAIN: ICD-10-CM

## 2020-07-01 RX ORDER — SIMVASTATIN 20 MG
20 TABLET ORAL DAILY
Qty: 90 TABLET | Refills: 0 | Status: SHIPPED | OUTPATIENT
Start: 2020-07-01 | End: 2020-10-06

## 2020-09-09 DIAGNOSIS — M62.81 GENERALIZED MUSCLE WEAKNESS: ICD-10-CM

## 2020-09-09 DIAGNOSIS — I10 ESSENTIAL HYPERTENSION, BENIGN: ICD-10-CM

## 2020-09-09 DIAGNOSIS — Z91.81 PERSONAL HISTORY OF FALL: ICD-10-CM

## 2020-09-09 DIAGNOSIS — R26.89 BALANCE PROBLEMS: ICD-10-CM

## 2020-09-13 RX ORDER — LISINOPRIL 10 MG/1
10 TABLET ORAL DAILY
Qty: 90 TABLET | Refills: 0 | Status: SHIPPED | OUTPATIENT
Start: 2020-09-13 | End: 2020-11-16

## 2020-09-13 NOTE — TELEPHONE ENCOUNTER
lisinopril (ZESTRIL) 10 MG tablet   Last Written Prescription Date: 6/12/20  Last Fill Quantity: 90,   # refills: 0  Last Office Visit : 12/16/19  Future Office visit:  11/23/20    bp > 140/90     90 day SENT to pharmacy    Routing refill request to provider for review/approval because: SUSAN oconnell past due.

## 2020-09-21 DIAGNOSIS — F32.1 MODERATE MAJOR DEPRESSION (H): ICD-10-CM

## 2020-09-23 RX ORDER — ESCITALOPRAM OXALATE 10 MG/1
10 TABLET ORAL DAILY
Qty: 90 TABLET | Refills: 0 | Status: SHIPPED | OUTPATIENT
Start: 2020-09-23 | End: 2020-11-16

## 2020-09-23 NOTE — TELEPHONE ENCOUNTER
escitalopram (LEXAPRO) 10 MG tablet   Last Written Prescription Date:  11/18/19  Last Fill Quantity: 90,   # refills: 3  Last Office Visit :12/16/19  Future Office visit: 11/23/20    90 day SENT to pharmacy    Routing refill request to provider for review/approval because:phq9

## 2020-10-05 DIAGNOSIS — E78.01 FAMILIAL HYPERCHOLESTEROLEMIA: Primary | ICD-10-CM

## 2020-10-05 DIAGNOSIS — R07.89 ATYPICAL CHEST PAIN: ICD-10-CM

## 2020-10-06 ENCOUNTER — OFFICE VISIT (OUTPATIENT)
Dept: INTERNAL MEDICINE | Facility: CLINIC | Age: 85
End: 2020-10-06
Payer: MEDICARE

## 2020-10-06 VITALS
BODY MASS INDEX: 24.96 KG/M2 | HEART RATE: 60 BPM | OXYGEN SATURATION: 94 % | SYSTOLIC BLOOD PRESSURE: 132 MMHG | DIASTOLIC BLOOD PRESSURE: 71 MMHG | WEIGHT: 150 LBS

## 2020-10-06 DIAGNOSIS — G60.9 IDIOPATHIC PERIPHERAL NEUROPATHY: ICD-10-CM

## 2020-10-06 DIAGNOSIS — G60.9 IDIOPATHIC PERIPHERAL NEUROPATHY: Primary | ICD-10-CM

## 2020-10-06 DIAGNOSIS — Z23 NEED FOR INFLUENZA VACCINATION: ICD-10-CM

## 2020-10-06 LAB
ANION GAP SERPL CALCULATED.3IONS-SCNC: 6 MMOL/L (ref 3–14)
BASOPHILS # BLD AUTO: 0 10E9/L (ref 0–0.2)
BASOPHILS NFR BLD AUTO: 0.6 %
BUN SERPL-MCNC: 23 MG/DL (ref 7–30)
CALCIUM SERPL-MCNC: 9.6 MG/DL (ref 8.5–10.1)
CHLORIDE SERPL-SCNC: 106 MMOL/L (ref 94–109)
CO2 SERPL-SCNC: 25 MMOL/L (ref 20–32)
CREAT SERPL-MCNC: 0.65 MG/DL (ref 0.52–1.04)
DIFFERENTIAL METHOD BLD: NORMAL
EOSINOPHIL # BLD AUTO: 0.1 10E9/L (ref 0–0.7)
EOSINOPHIL NFR BLD AUTO: 2.5 %
ERYTHROCYTE [DISTWIDTH] IN BLOOD BY AUTOMATED COUNT: 12.4 % (ref 10–15)
GFR SERPL CREATININE-BSD FRML MDRD: 81 ML/MIN/{1.73_M2}
GLUCOSE SERPL-MCNC: 92 MG/DL (ref 70–99)
HCT VFR BLD AUTO: 41.2 % (ref 35–47)
HGB BLD-MCNC: 13.7 G/DL (ref 11.7–15.7)
IMM GRANULOCYTES # BLD: 0 10E9/L (ref 0–0.4)
IMM GRANULOCYTES NFR BLD: 0.4 %
LYMPHOCYTES # BLD AUTO: 1.5 10E9/L (ref 0.8–5.3)
LYMPHOCYTES NFR BLD AUTO: 30.8 %
MCH RBC QN AUTO: 32.2 PG (ref 26.5–33)
MCHC RBC AUTO-ENTMCNC: 33.3 G/DL (ref 31.5–36.5)
MCV RBC AUTO: 97 FL (ref 78–100)
MONOCYTES # BLD AUTO: 0.6 10E9/L (ref 0–1.3)
MONOCYTES NFR BLD AUTO: 12 %
NEUTROPHILS # BLD AUTO: 2.6 10E9/L (ref 1.6–8.3)
NEUTROPHILS NFR BLD AUTO: 53.7 %
NRBC # BLD AUTO: 0 10*3/UL
NRBC BLD AUTO-RTO: 0 /100
PLATELET # BLD AUTO: 230 10E9/L (ref 150–450)
POTASSIUM SERPL-SCNC: 4.4 MMOL/L (ref 3.4–5.3)
RBC # BLD AUTO: 4.26 10E12/L (ref 3.8–5.2)
SODIUM SERPL-SCNC: 138 MMOL/L (ref 133–144)
TSH SERPL DL<=0.005 MIU/L-ACNC: 1.84 MU/L (ref 0.4–4)
WBC # BLD AUTO: 4.8 10E9/L (ref 4–11)

## 2020-10-06 PROCEDURE — 99213 OFFICE O/P EST LOW 20 MIN: CPT | Mod: 25 | Performed by: STUDENT IN AN ORGANIZED HEALTH CARE EDUCATION/TRAINING PROGRAM

## 2020-10-06 PROCEDURE — G0008 ADMIN INFLUENZA VIRUS VAC: HCPCS | Performed by: STUDENT IN AN ORGANIZED HEALTH CARE EDUCATION/TRAINING PROGRAM

## 2020-10-06 PROCEDURE — 36415 COLL VENOUS BLD VENIPUNCTURE: CPT | Performed by: PATHOLOGY

## 2020-10-06 PROCEDURE — 80048 BASIC METABOLIC PNL TOTAL CA: CPT | Performed by: PATHOLOGY

## 2020-10-06 PROCEDURE — 85025 COMPLETE CBC W/AUTO DIFF WBC: CPT | Performed by: PATHOLOGY

## 2020-10-06 PROCEDURE — 90662 IIV NO PRSV INCREASED AG IM: CPT | Performed by: STUDENT IN AN ORGANIZED HEALTH CARE EDUCATION/TRAINING PROGRAM

## 2020-10-06 PROCEDURE — 84443 ASSAY THYROID STIM HORMONE: CPT | Performed by: PATHOLOGY

## 2020-10-06 RX ORDER — SIMVASTATIN 20 MG
20 TABLET ORAL DAILY
Qty: 90 TABLET | Refills: 0 | Status: SHIPPED | OUTPATIENT
Start: 2020-10-06 | End: 2020-11-16

## 2020-10-06 ASSESSMENT — PAIN SCALES - GENERAL: PAINLEVEL: NO PAIN (0)

## 2020-10-06 NOTE — NURSING NOTE
Chief Complaint   Patient presents with     Numbness     Pt is having numbness and tingling in hands.      Eileen Mohr LPN at 10:08 AM on 10/6/2020.

## 2020-10-06 NOTE — PATIENT INSTRUCTIONS
[facility] Medication Refill Request Information:  * Please contact your pharmacy regarding ANY request for medication refills.  ** [facility] Prescription Fax = [phone]  * Please allow 3 business days for routine medication refills.  * Please allow 5 business days for controlled substance medication refills.     [facility] Test Result notification information:  *You will be notified with in 7-10 days of your appointment day regarding the results of your test.  If you are on MyChart you will be notified as soon as the provider has reviewed the results and signed off on them.    [facility]: [phone]

## 2020-11-16 ENCOUNTER — OFFICE VISIT (OUTPATIENT)
Dept: INTERNAL MEDICINE | Facility: CLINIC | Age: 85
End: 2020-11-16
Payer: MEDICARE

## 2020-11-16 VITALS
SYSTOLIC BLOOD PRESSURE: 137 MMHG | BODY MASS INDEX: 25.04 KG/M2 | OXYGEN SATURATION: 96 % | DIASTOLIC BLOOD PRESSURE: 71 MMHG | HEART RATE: 63 BPM | WEIGHT: 150.5 LBS

## 2020-11-16 DIAGNOSIS — Z91.81 PERSONAL HISTORY OF FALL: ICD-10-CM

## 2020-11-16 DIAGNOSIS — F32.1 MODERATE MAJOR DEPRESSION (H): ICD-10-CM

## 2020-11-16 DIAGNOSIS — M62.81 GENERALIZED MUSCLE WEAKNESS: ICD-10-CM

## 2020-11-16 DIAGNOSIS — R07.89 ATYPICAL CHEST PAIN: ICD-10-CM

## 2020-11-16 DIAGNOSIS — E78.01 FAMILIAL HYPERCHOLESTEROLEMIA: ICD-10-CM

## 2020-11-16 DIAGNOSIS — I10 ESSENTIAL HYPERTENSION, BENIGN: ICD-10-CM

## 2020-11-16 DIAGNOSIS — R26.89 BALANCE PROBLEMS: ICD-10-CM

## 2020-11-16 PROCEDURE — 99214 OFFICE O/P EST MOD 30 MIN: CPT | Performed by: INTERNAL MEDICINE

## 2020-11-16 RX ORDER — SIMVASTATIN 20 MG
20 TABLET ORAL DAILY
Qty: 90 TABLET | Refills: 3 | Status: SHIPPED | OUTPATIENT
Start: 2020-11-16 | End: 2021-11-29

## 2020-11-16 RX ORDER — ESCITALOPRAM OXALATE 10 MG/1
10 TABLET ORAL DAILY
Qty: 90 TABLET | Refills: 3 | Status: SHIPPED | OUTPATIENT
Start: 2020-11-16 | End: 2021-11-29

## 2020-11-16 RX ORDER — LISINOPRIL 10 MG/1
10 TABLET ORAL DAILY
Qty: 90 TABLET | Refills: 3 | Status: SHIPPED | OUTPATIENT
Start: 2020-11-16 | End: 2021-11-29

## 2020-11-16 ASSESSMENT — PAIN SCALES - GENERAL: PAINLEVEL: NO PAIN (0)

## 2020-11-16 NOTE — PROGRESS NOTES
Medicare Annual Wellness Visit Previsit note    This 85 year old year old female presents for a  Medicare Wellness Exam.           Medical Care     Have you been to an ER or a hospital in the last year? No  What other specialists or organizations are involved in your medical care?  Listed below  Current providers sharing in care for this patient include:  Patient Care Team       Relationship Specialty Notifications Start End    Nickolas Davenport MD PCP - General Internal Medicine  1/26/12     Phone: [phone] Pager: [phone] Fax: [phone]        [address] [place] [address]    Gutierrez Fitzgerald DPM  Podiatry  7/28/16     Phone: [phone] Fax: [phone]         [address] [place] [id number]    Vicky Castano MD MD Internal Medicine  10/17/16     Pedro Epstein MD MD Cardiology  11/30/16     Phone: [phone] Fax: [phone]         [address] [place] [address]    Jyoti Lozano MD MD Internal Medicine  11/30/16     Phone: [phone] Fax: [phone]         [address] [place] [address]    Efrain Barbosa DPM MD Podiatry  6/23/17     Phone: [phone] Fax: [phone]         [address] [facility] [address]    Stephen Rodriguez,  Resident Student in organized health care education/training program  8/10/17     Phone: [phone] Fax: [phone]         [facility] [address] [facility] [address]    Lashon Gonzales APRN CNP Nurse Practitioner Nurse Practitioner - Family  7/31/18     Phone: [phone] Fax: [phone]         [address] [place] [address]    Nickolas Davenport MD Assigned PCP   5/21/20     Phone: [phone] Pager: [phone] Fax: [phone] [address] [address]    Josh Alberto MD Assigned Musculoskeletal Provider   10/23/20     Phone: [phone] Fax: [phone] [address] [facility] [address]    Claus Lebron MD Assigned Behavioral Health Provider   10/23/20     Phone: [phone] Fax: [phone] [address] [facility] [address]    NEDA Enamorado MD Assigned Heart and Vascular Provider   10/23/20     Phone: [phone] Fax: [phone] [address] [facility] [address] [facility] [address]    Ariadne Tom MD Assigned Neuroscience Provider   10/23/20     Phone: [phone] Fax: [phone]         [address] [facility] [address]                 Social History     Marital Status:Single  Who lives in your household? Self  Does your home have any of the following safety concerns? Loose rugs in the hallway, no grab bars in the bathroom, no handrails on the stairs or have poorly lit areas?  No  Do you feel threatened or controlled by a partner, ex-partner or anyone in your life? No  Has anyone hurt you physically, for example by pushing, hitting, slapping or kicking you   or forcing you to have sex? No  Do you need help with the phone, transportation, shopping, preparing meals, housework, laundry, medications or managing money? No   Have you noticed any hearing difficulties? No      Risk Behaviors and Healthy Habits     How many servings of fruits and vegetables do you eat a day? 2  How often do you exercise and what do you do? 30 minutes 7X a week  Do you frequently ride without a seatbelt? No  Do you use tobacco?  No  Do you use any other drugs? No       Do you use alcohol?Yes  Number of drinks per day 0  Number of drinking days a week 4      Sexual Health     Are you sexually active? No   If yes, with men, women, or both? NA  If yes, do you more than one current partner?N/A  If yes, do you use condoms? N/A  Have you had any sexually transmitted infections? No  Any sexual concerns? No       FOR WOMEN ONLY  What year did you stop having periods? 1980  Any vaginal bleeding in the last year? No  Have you ever had an abnormal Pap smear? No

## 2020-11-16 NOTE — NURSING NOTE
Chief Complaint   Patient presents with     Medicare Visit     Pt would like medicare annual wellness visit today      JENNY Ingram at 2:16 PM sign on 11/16/2020

## 2020-11-16 NOTE — PROGRESS NOTES
S) Ms. Mata is an 86 yo woman with a h/o hyperlipidemia, HTN, hand paresthesias and depression seen for ongoing care and f/u. She is UTD on screening (opts out of further colon screening) and immunizations (had Shingrix at [facility]- not listed in our record). She responded well to escitalopram last fall- sent me a note relating she was experiencing LEIGH again! Has been camping, did a fall leaves tour, has a robust social network. All good. Not interested in tapering or stopping the escitalopram as doesn't want to risk recurrence. No SEs.   Current Outpatient Medications   Medication     aspirin 81 MG chewable tablet     Calcium Carbonate (CALCIUM 600 PO)     diclofenac (VOLTAREN) 1 % topical gel     diclofenac (VOLTAREN) 1 % topical gel     escitalopram (LEXAPRO) 10 MG tablet     fish oil-omega-3 fatty acids (FISH OIL) 1000 MG capsule     Glucosamine 500 MG CAPS     lisinopril (ZESTRIL) 10 MG tablet     Multiple Vitamin (MULTI-VITAMIN) per tablet     simvastatin (ZOCOR) 20 MG tablet     No current facility-administered medications for this visit.        O) /71 (BP Location: Right arm, Patient Position: Sitting, Cuff Size: Adult Regular)   Pulse 63   Wt 68.3 kg (150 lb 8 oz)   SpO2 96%   Breastfeeding No   BMI 25.04 kg/m    Well appearing  HEENT nl  Neck supple  Chest clear bilaterally  Cor RRR no MRG  Abd benign  No ankle edema  Mentation clear. Balance normal.    A/P) 1) HCM. UTD on screening. Walking a mile daily. Immunizations UTD. Taking appropriate COVID precautions.   2) HTN. BP in target range on current regimen. No changes. BMP from 10/6/20 normal.   3) Hyperlipidemia. Labs from 2019 excellent and no change in diet/exercise/meds. Won't have her come back for repeat fasting labs.   4) Depression in remission. No change to escitalopram 10 mg/d.   5) COVID risk related to age and HTN. Continued precautions including masking and regular testing (weekly in her current setting).     Over 15 min of 25  min visit spent in care coordination and counseling related to the above issues.    Nickolas Davenport MD, FACP, FAAP

## 2021-03-16 ENCOUNTER — OFFICE VISIT (OUTPATIENT)
Dept: INTERNAL MEDICINE | Facility: CLINIC | Age: 86
End: 2021-03-16
Payer: MEDICARE

## 2021-03-16 VITALS — HEART RATE: 64 BPM | DIASTOLIC BLOOD PRESSURE: 74 MMHG | SYSTOLIC BLOOD PRESSURE: 137 MMHG | OXYGEN SATURATION: 97 %

## 2021-03-16 DIAGNOSIS — B35.1 ONYCHOMYCOSIS: Primary | ICD-10-CM

## 2021-03-16 PROCEDURE — 99213 OFFICE O/P EST LOW 20 MIN: CPT | Mod: GE | Performed by: STUDENT IN AN ORGANIZED HEALTH CARE EDUCATION/TRAINING PROGRAM

## 2021-03-16 NOTE — NURSING NOTE
Chief Complaint   Patient presents with     Musculoskeletal Problem     foot fungus, sounds like chronic issue, bilateral feet, concerned about growing toe nails       Delilah Marcos, JENNY at 2:17 PM on 3/16/2021

## 2021-03-16 NOTE — PROGRESS NOTES
[facility]     Patient Name: Lynnette Mata  YOB: 1935  MRN: [id number]    Date of Service: March 16, 2021  Chief Complaint: onychomycosis         HISTORY OF PRESENT ILLNESS:      Ms. Mata is an 84 yo woman with a h/o hyperlipidemia, HTN, hand paresthesias and depression who presents for evaluation of her toenails.    She is concerned about onychomycosis. She has been living with this for years but notes that it is getting worse. She has never had this treated. This affects her 1st digits of both feet. The toe nails are thicker than her others especially at the distal ends and appear yellow. They are also growing at an outward angle. This is worse in her R 1st digit and her long toe nail is cutting into the skin of her medial distal 2nd digit. This is occasionally painful. She denies fevers, chills, prior surgeries of her feet/ankles/toes. She does not have DM2. She has never seen a podiatrist. She ambulates with the assistance of poles on both sides. She does not require special shoes. Denies recent trauma to her feet or falls. She walks daily for exercise for ~ 30 minutes/day.    Medications and allergies reviewed by me today.          PAST MEDICAL HISTORY:     Past Medical History:   Diagnosis Date     Asymptomatic menopausal state age 45    Hx HRT use x 6+ years--off with [facility] study findings     Shingles outbreak 2/14/2013    Despite immunization at age 72!        Stricture and stenosis of cervix      Unspecified constipation     hx chronic          REVIEW OF SYSTEMS:     10 point ROS was negative except as noted in HPI         HOME MEDICATIONS:     Current Outpatient Medications   Medication     aspirin 81 MG chewable tablet     Calcium Carbonate (CALCIUM 600 PO)     diclofenac (VOLTAREN) 1 % topical gel     diclofenac (VOLTAREN) 1 % topical gel     escitalopram (LEXAPRO) 10 MG tablet     fish oil-omega-3 fatty acids (FISH OIL) 1000 MG capsule     Glucosamine 500 MG CAPS      lisinopril (ZESTRIL) 10 MG tablet     Multiple Vitamin (MULTI-VITAMIN) per tablet     simvastatin (ZOCOR) 20 MG tablet     No current facility-administered medications for this visit.           PHYSICAL EXAM:   Vitals: /74 (BP Location: Right arm, Patient Position: Sitting, Cuff Size: Adult Regular)   Pulse 64   SpO2 97%   Breastfeeding No     GENERAL: pleasant woman in NAD.  R foot: normal sensation all throughout, appropriate strength, no bruising, distal abrasion of medial distal 2nd digit adjacent to overgrown 1st digit toenail, thick yellow overgrown 1st digit toenail, no cuts elsewhere  L foot: normal sensation all throughout, appropriate strength, no bruising, thick yellow overgrown 1st digit toenail         DATA:     Not relevant          ASSESSMENT & PLAN:     Ms. Mata is an 86 yo woman with a h/o hyperlipidemia, HTN, hand paresthesias and depression who presents for chronic overgrown toenails of the 1st digits of both of her feet. Her exam was consistent with onychomycosis. I trimmed the distal lateral toe nail of her R foot where it was rubbing against her 2nd digit causing the break in the skin. The R 2nd digit wound did not appear infected. She was agreeable to meeting with podiatry to discuss next steps including more extensive toe nail trimming and anti-fungal medications.    # Onychomycosis  -     Orthopedic & Spine  Referral, Podiatry, Non-surgical; Future    Patient care plan discussed with attending physician, Dr. Evans, who agreed with above.    Ky Levy Jr, MD  Internal Medicine, PGY-2  [phone]    While the patient was in clinic, I reviewed the pertinent medical history and results.  I discussed the current findings on physical examination, as well as the patient s diagnosis and treatment plan with the resident and agree with the information as documented with the following exceptions: none.  Funmilayo Evans MD  Internal Medicine

## 2021-03-18 NOTE — TELEPHONE ENCOUNTER
RECORDS RECEIVED FROM: Onychomycosis/ Dr Levy/ no images/ Medicare and HP/ ortho con   DATE RECEIVED: Apr 6, 2021     NOTES STATUS DETAILS   OFFICE NOTE from referring provider Internal  Ky Levy Jr., MD        OFFICE NOTE from other specialist N/A    DISCHARGE SUMMARY from hospital N/A    DISCHARGE REPORT from the ER N/A    OPERATIVE REPORT N/A    MEDICATION LIST Internal    IMPLANT RECORD/STICKER N/A    LABS     CBC/DIFF N/A    CULTURES N/A    INJECTIONS DONE IN RADIOLOGY N/A    MRI N/A    CT SCAN N/A    XRAYS (IMAGES & REPORTS) N/A    TUMOR     PATHOLOGY  Slides & report N/A      03/18/21   2:49 PM   COMPLETE  Graciela Roger CMA

## 2021-04-03 ENCOUNTER — HEALTH MAINTENANCE LETTER (OUTPATIENT)
Age: 86
End: 2021-04-03

## 2021-04-06 ENCOUNTER — PRE VISIT (OUTPATIENT)
Dept: ORTHOPEDICS | Facility: CLINIC | Age: 86
End: 2021-04-06

## 2021-04-07 NOTE — TELEPHONE ENCOUNTER
RECORDS RECEIVED FROM: Onychomycosis/ Dr Levy/ no images/ Medicare and HP/ ortho con   DATE RECEIVED: May 3, 2021     NOTES STATUS DETAILS   OFFICE NOTE from referring provider Internal  Ky Levy Jr., MD   OFFICE NOTE from other specialist N/A    DISCHARGE SUMMARY from hospital N/A    DISCHARGE REPORT from the ER N/A    OPERATIVE REPORT N/A    MEDICATION LIST Internal    IMPLANT RECORD/STICKER N/A    LABS     CBC/DIFF N/A    CULTURES N/A    INJECTIONS DONE IN RADIOLOGY N/A    MRI N/A    CT SCAN N/A    XRAYS (IMAGES & REPORTS) N/A    TUMOR     PATHOLOGY  Slides & report N/A      04/07/21   3:42 PM   COMPLETE  Graciela Roger CMA

## 2021-05-03 ENCOUNTER — OFFICE VISIT (OUTPATIENT)
Dept: ORTHOPEDICS | Facility: CLINIC | Age: 86
End: 2021-05-03
Payer: MEDICARE

## 2021-05-03 ENCOUNTER — PRE VISIT (OUTPATIENT)
Dept: ORTHOPEDICS | Facility: CLINIC | Age: 86
End: 2021-05-03

## 2021-05-03 DIAGNOSIS — B35.1 OM (ONYCHOMYCOSIS): Primary | ICD-10-CM

## 2021-05-03 PROCEDURE — 99213 OFFICE O/P EST LOW 20 MIN: CPT | Performed by: PODIATRIST

## 2021-05-03 NOTE — NURSING NOTE
Reason For Visit:   Chief Complaint   Patient presents with     Consult     Onchomycosis, bilateral hallux.        Pain Assessment  Patient Currently in Pain: Denies        No Known Allergies        Justyna Branch LPN

## 2021-05-03 NOTE — LETTER
5/3/2021         RE: Lynnette Mata  [address]  [place] [address]        Dear Colleague,    Thank you for referring your patient, Lynnette Mata, to the [facility] [place]. Please see a copy of my visit note below.    Date of Service: 4/6/2021    Chief Complaint:   Chief Complaint   Patient presents with     Consult     Onchomycosis, bilateral hallux.         HPI: Lynnette is a 85 year old female who presents today for further evaluation of BL toe onychomycosis. This is on her big toes. The right hallux nail was cutting into the 2nd digit. These are sometimes painful.     Review of Systems: No n/v/d/f/c/ns/sob/cp  PMH:   Past Medical History:   Diagnosis Date     Asymptomatic menopausal state age 45    Hx HRT use x 6+ years--off with  study findings     Shingles outbreak 2/14/2013    Despite immunization at age 72!        Stricture and stenosis of cervix      Unspecified constipation     hx chronic       PSxH:   Past Surgical History:   Procedure Laterality Date     COLONOSCOPY  4.15.09    (Fiberoptic) WNL -- ami in 10 yrs       Allergies: Patient has no known allergies.    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[employer]   Social Needs     Financial resource strain: Not on file     Food insecurity     Worry: Not on file     Inability: Not on file     Transportation needs     Medical: Not on file     Non-medical: Not on file   Tobacco Use     Smoking status: Never Smoker     Smokeless tobacco: Never Used   Substance and Sexual Activity     Alcohol use: Yes     Alcohol/week: 0.0 standard drinks     Comment: 1 d; <3x week / week MAX,      Drug use: No     Sexual activity:  Never     Partners: Male     Birth control/protection: Post-menopausal     Comment:  age 45   Lifestyle     Physical activity     Days per week: Not on file     Minutes per session: Not on file     Stress: Not on file   Relationships     Social connections     Talks on phone: Not on file     Gets together: Not on file     Attends Yarsanism service: Not on file     Active member of club or organization: Not on file     Attends meetings of clubs or organizations: Not on file     Relationship status: Not on file     Intimate partner violence     Fear of current or ex partner: Not on file     Emotionally abused: Not on file     Physically abused: Not on file     Forced sexual activity: Not on file   Other Topics Concern      Service No     Blood Transfusions No     Caffeine Concern No     Occupational Exposure No     Hobby Hazards No     Sleep Concern No     Stress Concern No     Weight Concern No     Special Diet No     Back Care No     Exercise No     Comment: Walks 3x weeks; 30 minutes     Bike Helmet No     Seat Belt No     Self-Exams Not Asked   Social History Narrative    How much exercise per week? Half hour everyday    How much calcium per day? Supplement       How much caffeine per day? 1 cup daily    How much vitamin D per day? MVI    Do you/your family wear seatbelts?  Yes    Do you/your family use safety helmets? No    Do you/your family use sunscreen? No    Do you/your family keep firearms in the home? No    Do you/your family have a smoke detector(s)? Yes        Do you feel safe in your home? Yes    Has anyone ever touched you in an unwanted manner? No     Explain     SEE ARYAN URIOSTEGUI    2/3/2014        Simona KIM CMA05/01/2017    Reviewed John KIM CMA 8/2/2018           FH:   Family History   Problem Relation Age of Onset     Myocardial Infarction Mother 74     Cerebrovascular Disease Maternal Grandmother 74     Cancer - colorectal Maternal Aunt 68     Endocrine Disease Brother 10        Type I  Diabetes Mellitus     Psychotic Disorder Brother         bipolar     C.A.D. Brother      C.A.D. Father      Psychotic Disorder Father 49        suicide     Diabetes Maternal Grandfather 65        DMII     Parkinsonism Other                Objective:  Data Unavailable Data Unavailable Data Unavailable Data Unavailable Data Unavailable 0 lbs 0 oz    PT and DP pulses are 1/4 bilaterally. CRT is 1 second. Positive pedal hair.   Gross sensation is intact bilaterally.   Equinus is noted bilaterally. No pain with active or passive ROM of the ankle, MTJ, 1st ray, or halluces bilaterally,.   Nails of bilateral halluces are thickened and mycotic. No open lesions are noted.     Assessment: BL hallux onychomycosis.     Plan:  - Pt seen and evaluated.  - Not a good candidate for PO Lamisil 2/2 age. Topicals are largely ineffective at curing mycosis. Recommend that she keep the nails trimmed back.   - Nails debrided x 2.   - See again PRN.          Efrain Barbosa, DHARMESH

## 2021-05-04 NOTE — PROGRESS NOTES
Date of Service: 4/6/2021    Chief Complaint:   Chief Complaint   Patient presents with     Consult     Onchomycosis, bilateral hallux.         HPI: Lynnette is a 85 year old female who presents today for further evaluation of BL toe onychomycosis. This is on her big toes. The right hallux nail was cutting into the 2nd digit. These are sometimes painful.     Review of Systems: No n/v/d/f/c/ns/sob/cp  PMH:   Past Medical History:   Diagnosis Date     Asymptomatic menopausal state age 45    Hx HRT use x 6+ years--off with  study findings     Shingles outbreak 2/14/2013    Despite immunization at age 72!        Stricture and stenosis of cervix      Unspecified constipation     hx chronic       PSxH:   Past Surgical History:   Procedure Laterality Date     COLONOSCOPY  4.15.09    (Fiberoptic) WNL -- ami in 10 yrs       Allergies: Patient has no known allergies.    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[employer]   Social Needs     Financial resource strain: Not on file     Food insecurity     Worry: Not on file     Inability: Not on file     Transportation needs     Medical: Not on file     Non-medical: Not on file   Tobacco Use     Smoking status: Never Smoker     Smokeless tobacco: Never Used   Substance and Sexual Activity     Alcohol use: Yes     Alcohol/week: 0.0 standard drinks     Comment: 1 d; <3x week / week MAX,      Drug use: No     Sexual activity: Never     Partners: Male     Birth control/protection: Post-menopausal     Comment:  age 45   Lifestyle     Physical activity     Days per week: Not on file     Minutes per session: Not on file     Stress: Not on file   Relationships     Social connections     Talks on phone: Not on  file     Gets together: Not on file     Attends Episcopal service: Not on file     Active member of club or organization: Not on file     Attends meetings of clubs or organizations: Not on file     Relationship status: Not on file     Intimate partner violence     Fear of current or ex partner: Not on file     Emotionally abused: Not on file     Physically abused: Not on file     Forced sexual activity: Not on file   Other Topics Concern      Service No     Blood Transfusions No     Caffeine Concern No     Occupational Exposure No     Hobby Hazards No     Sleep Concern No     Stress Concern No     Weight Concern No     Special Diet No     Back Care No     Exercise No     Comment: Walks 3x weeks; 30 minutes     Bike Helmet No     Seat Belt No     Self-Exams Not Asked   Social History Narrative    How much exercise per week? Half hour everyday    How much calcium per day? Supplement       How much caffeine per day? 1 cup daily    How much vitamin D per day? MVI    Do you/your family wear seatbelts?  Yes    Do you/your family use safety helmets? No    Do you/your family use sunscreen? No    Do you/your family keep firearms in the home? No    Do you/your family have a smoke detector(s)? Yes        Do you feel safe in your home? Yes    Has anyone ever touched you in an unwanted manner? No     Explain     SEE ARYAN URIOSTEGUI    2/3/2014        Reviewed John KIM CMA05/01/2017    Reviewed John KIM CMA 8/2/2018           FH:   Family History   Problem Relation Age of Onset     Myocardial Infarction Mother 74     Cerebrovascular Disease Maternal Grandmother 74     Cancer - colorectal Maternal Aunt 68     Endocrine Disease Brother 10        Type I Diabetes Mellitus     Psychotic Disorder Brother         bipolar     C.A.D. Brother      C.A.D. Father      Psychotic Disorder Father 49        suicide     Diabetes Maternal Grandfather 65        DMII     Parkinsonism Other                Objective:  Data Unavailable Data  Unavailable Data Unavailable Data Unavailable Data Unavailable 0 lbs 0 oz    PT and DP pulses are 1/4 bilaterally. CRT is 1 second. Positive pedal hair.   Gross sensation is intact bilaterally.   Equinus is noted bilaterally. No pain with active or passive ROM of the ankle, MTJ, 1st ray, or halluces bilaterally,.   Nails of bilateral halluces are thickened and mycotic. No open lesions are noted.     Assessment: BL hallux onychomycosis.     Plan:  - Pt seen and evaluated.  - Not a good candidate for PO Lamisil 2/2 age. Topicals are largely ineffective at curing mycosis. Recommend that she keep the nails trimmed back.   - Nails debrided x 2.   - See again PRN.

## 2021-07-16 ENCOUNTER — ALLIED HEALTH/NURSE VISIT (OUTPATIENT)
Dept: INTERNAL MEDICINE | Facility: CLINIC | Age: 86
End: 2021-07-16
Payer: MEDICARE

## 2021-07-16 DIAGNOSIS — H61.23 BILATERAL IMPACTED CERUMEN: Primary | ICD-10-CM

## 2021-07-16 PROCEDURE — 69209 REMOVE IMPACTED EAR WAX UNI: CPT | Mod: 50

## 2021-07-16 NOTE — NURSING NOTE
Chief Complaint   Patient presents with     Nurse Visit     pt here for bilateral ear wash       Miriam Nieves CMA, EMT at 3:30 PM on 7/16/2021.

## 2021-07-16 NOTE — NURSING NOTE
Lynnette Mata comes into clinic today at the request of Dr. Davenport Ordering Provider for an ear wash.      EAR WASH    An ear wash was performed by myself while the patient was in clinic.   The patient tolerated this procedure well and had no complications.    A large amount of impacted cerumen was removed from both ear/ears.    Tympanic membrane was  visible.     Miriam Nieves CMA at 3:30 PM on 7/16/2021.         This service provided today was under the supervising provider of the day Dr. Orellana, who was available if needed.    JENNY Belcher at 3:30 PM on 7/16/2021.

## 2021-08-26 ENCOUNTER — TELEPHONE (OUTPATIENT)
Dept: INTERNAL MEDICINE | Facility: CLINIC | Age: 86
End: 2021-08-26

## 2021-08-26 NOTE — CONFIDENTIAL NOTE
Spoke to Lynnette via telephone regarding recent call into the clinic about a stiff neck. She reported that she noticed it yesterday mid-day, and was not doing anything out of the ordinary. She said that she for the most part has full range of motion of her neck and head other than moving it chin-chest. She denies any headaches, numbness or tingling of her arms or hands. I recommended that she alternate tylenol and ibuprofen, use ice, try to relax as much as possible, and perform light stretching. If she does not feel better over the weekend I recommended that she be evaluated in person. Notified her that if she does get headaches, numbness, tingling or pain in the arms or shoulders to go be evaluated at an urgent care or ER. She agreed with plan.  Shazia Michel RN

## 2021-08-26 NOTE — TELEPHONE ENCOUNTER
[facility] Call Center    Phone Message    May a detailed message be left on voicemail: yes     Reason for Call: Symptoms or Concerns     If patient has red-flag symptoms, warm transfer to triage line    Current symptom or concern: stiff neck, very sore.  Hurts to move side to side and up and down.     Symptoms have been present for:  1 day(s)    Has patient previously been seen for this? No    By : NA    Date: NA    Are there any new or worsening symptoms? Yes: New and worsening..patient is looking for some home remedies that may help relieve the pain.           Action Taken: Message routed to:  [facility]    Travel Screening: Not Applicable

## 2021-09-12 ENCOUNTER — HEALTH MAINTENANCE LETTER (OUTPATIENT)
Age: 86
End: 2021-09-12

## 2021-09-30 ENCOUNTER — MYC MEDICAL ADVICE (OUTPATIENT)
Dept: INTERNAL MEDICINE | Facility: CLINIC | Age: 86
End: 2021-09-30

## 2021-11-03 ENCOUNTER — TRANSFERRED RECORDS (OUTPATIENT)
Dept: HEALTH INFORMATION MANAGEMENT | Facility: CLINIC | Age: 86
End: 2021-11-03
Payer: MEDICARE

## 2021-11-29 ENCOUNTER — OFFICE VISIT (OUTPATIENT)
Dept: INTERNAL MEDICINE | Facility: CLINIC | Age: 86
End: 2021-11-29
Payer: MEDICARE

## 2021-11-29 ENCOUNTER — LAB (OUTPATIENT)
Dept: LAB | Facility: CLINIC | Age: 86
End: 2021-11-29
Payer: MEDICARE

## 2021-11-29 VITALS
DIASTOLIC BLOOD PRESSURE: 69 MMHG | RESPIRATION RATE: 16 BRPM | BODY MASS INDEX: 25.71 KG/M2 | HEIGHT: 66 IN | SYSTOLIC BLOOD PRESSURE: 134 MMHG | HEART RATE: 59 BPM | WEIGHT: 160 LBS | OXYGEN SATURATION: 95 %

## 2021-11-29 DIAGNOSIS — I10 ESSENTIAL HYPERTENSION, BENIGN: ICD-10-CM

## 2021-11-29 DIAGNOSIS — M85.88 OTHER SPECIFIED DISORDERS OF BONE DENSITY AND STRUCTURE, OTHER SITE: ICD-10-CM

## 2021-11-29 DIAGNOSIS — F32.1 MODERATE MAJOR DEPRESSION (H): ICD-10-CM

## 2021-11-29 DIAGNOSIS — M62.81 GENERALIZED MUSCLE WEAKNESS: ICD-10-CM

## 2021-11-29 DIAGNOSIS — E78.01 FAMILIAL HYPERCHOLESTEROLEMIA: ICD-10-CM

## 2021-11-29 DIAGNOSIS — R07.89 ATYPICAL CHEST PAIN: ICD-10-CM

## 2021-11-29 DIAGNOSIS — M17.10 ARTHRITIS OF KNEE: Primary | ICD-10-CM

## 2021-11-29 DIAGNOSIS — Z91.81 PERSONAL HISTORY OF FALL: ICD-10-CM

## 2021-11-29 DIAGNOSIS — R26.89 BALANCE PROBLEMS: ICD-10-CM

## 2021-11-29 LAB
ANION GAP SERPL CALCULATED.3IONS-SCNC: 5 MMOL/L (ref 3–14)
BUN SERPL-MCNC: 27 MG/DL (ref 7–30)
CALCIUM SERPL-MCNC: 9.4 MG/DL (ref 8.5–10.1)
CHLORIDE BLD-SCNC: 108 MMOL/L (ref 94–109)
CO2 SERPL-SCNC: 29 MMOL/L (ref 20–32)
CREAT SERPL-MCNC: 0.68 MG/DL (ref 0.52–1.04)
GFR SERPL CREATININE-BSD FRML MDRD: 79 ML/MIN/1.73M2
GLUCOSE BLD-MCNC: 92 MG/DL (ref 70–99)
POTASSIUM BLD-SCNC: 4.4 MMOL/L (ref 3.4–5.3)
SODIUM SERPL-SCNC: 142 MMOL/L (ref 133–144)

## 2021-11-29 PROCEDURE — 99214 OFFICE O/P EST MOD 30 MIN: CPT | Performed by: INTERNAL MEDICINE

## 2021-11-29 PROCEDURE — 36415 COLL VENOUS BLD VENIPUNCTURE: CPT | Performed by: PATHOLOGY

## 2021-11-29 PROCEDURE — 80048 BASIC METABOLIC PNL TOTAL CA: CPT | Performed by: PATHOLOGY

## 2021-11-29 RX ORDER — SIMVASTATIN 20 MG
20 TABLET ORAL DAILY
Qty: 90 TABLET | Refills: 3 | Status: SHIPPED | OUTPATIENT
Start: 2021-11-29 | End: 2022-12-12

## 2021-11-29 RX ORDER — LISINOPRIL 10 MG/1
10 TABLET ORAL DAILY
Qty: 90 TABLET | Refills: 3 | Status: SHIPPED | OUTPATIENT
Start: 2021-11-29 | End: 2022-12-12

## 2021-11-29 RX ORDER — ESCITALOPRAM OXALATE 10 MG/1
10 TABLET ORAL DAILY
Qty: 90 TABLET | Refills: 3 | Status: SHIPPED | OUTPATIENT
Start: 2021-11-29 | End: 2022-12-12

## 2021-11-29 ASSESSMENT — ANXIETY QUESTIONNAIRES
5. BEING SO RESTLESS THAT IT IS HARD TO SIT STILL: NOT AT ALL
7. FEELING AFRAID AS IF SOMETHING AWFUL MIGHT HAPPEN: NOT AT ALL
6. BECOMING EASILY ANNOYED OR IRRITABLE: NOT AT ALL
GAD7 TOTAL SCORE: 0
2. NOT BEING ABLE TO STOP OR CONTROL WORRYING: NOT AT ALL
3. WORRYING TOO MUCH ABOUT DIFFERENT THINGS: NOT AT ALL
1. FEELING NERVOUS, ANXIOUS, OR ON EDGE: NOT AT ALL

## 2021-11-29 ASSESSMENT — PATIENT HEALTH QUESTIONNAIRE - PHQ9: 5. POOR APPETITE OR OVEREATING: NOT AT ALL

## 2021-11-29 ASSESSMENT — MIFFLIN-ST. JEOR: SCORE: 1182.51

## 2021-11-29 ASSESSMENT — PAIN SCALES - GENERAL: PAINLEVEL: NO PAIN (0)

## 2021-11-29 NOTE — PROGRESS NOTES
"S) Ms. Mata is an 85 yo woman with a relatively uncomplicated PMH including depression, HTN, hyperlipidemia, and low bone density seen for annual wellness care and f/u. She has been well. One fall at home that was provoked (no medical events preceding it) about a year ago. Balance and gait impaired. Muscle strength is an issue. No tremors. ROS remarkable as below and for knee pain bilaterally. Tylenol most days (under 2 gm).       Reviewed Medicare Wellness Questionnaire results:  Medicare Wellness Health Risk Assessment Questionnaire     What is your marital status?       Who lives in your household?  \"Only me\"     Does your home have loose rugs in the hallway:        No     Does your home have grab bars in the bathroom:       Yes      Does your home have handrails on the stairs?  Yes      Does your home have poorly lit areas?                        No     How many times have you fallen in the last year?  Once     How many times have you been to the Emergency Room in the last year?  0     How many times have you been hospitalized in the last year?  0     Do you feel threatened or controlled by a partner, ex-partner or anyone in your life?   No     Has anyone hurt you physically, for example by pushing, hitting, slapping or kicking you or forcing you to have sex?     No     Are you sexually active?    No     If yes, with men, women, or both?    N/A     If yes, do you more than one current partner?             N/A     If yes, are you using condoms?    N/A     Have you had any sexually transmitted infections in the last year?   No     Do you have any sexual concerns?     No     Do you need help with the phone, transportation, shopping, preparing meals, housework, laundry, medications or managing money?   No     Have you noticed any hearing difficulties?   No     Do you wear hearing aids?       No     Have you seen a hearing professional such as an audiologist in the last 1 year?   No     Do you have vision " "difficulty?               No     Do you wear glasses or contacts?       Yes      Have you seen an eye doctor in the last 1 year?         Yes      Women: What year did you stop having periods (approximate age)?  \"40?\"     Women: Any vaginal bleeding in the last year?           No     Women: Have you ever had an abnormal Pap smear?   No     How many servings of fruits and vegetables do you eat a day?  2 cups     How often do you exercise in a week?  1/2 hour every day     What kind of exercise do you do?  Walking for 1/2 hour     Do you wear a seatbelt when driving or riding in a vehicle?                Yes      Do you use tobacco?    No     Do you use e-cigarettes?    No     Do you use any other drugs?   No                                                                Do you drink alcohol?   Yes      If you drink alcohol, how many drinks per week?  3 or 4     Over the last 2 weeks, how often have you been bothered by feeling down, depressed, or hopeless?  Not at all (0)     Over the last 2 weeks, how often have you had little interest or pleasure in doing things?  Not at all (0)         Have you completed an Advance Directives document?  Yes      If yes, have you given a copy to the clinic?      Yes      Do you need information on Advance Directives?       No  Current Outpatient Medications   Medication     Calcium Carbonate (CALCIUM 600 PO)     diclofenac (VOLTAREN) 1 % topical gel     diclofenac (VOLTAREN) 1 % topical gel     escitalopram (LEXAPRO) 10 MG tablet     fish oil-omega-3 fatty acids (FISH OIL) 1000 MG capsule     Glucosamine 500 MG CAPS     lisinopril (ZESTRIL) 10 MG tablet     Multiple Vitamin (MULTI-VITAMIN) per tablet     simvastatin (ZOCOR) 20 MG tablet     No current facility-administered medications for this visit.         O) /69 (BP Location: Right arm, Patient Position: Sitting, Cuff Size: Adult Regular)   Pulse 59   Resp 16   Ht 1.676 m (5' 6\")   Wt 72.6 kg (160 lb)   SpO2 95%   BMI " 25.82 kg/m    Well appearing  abnl gait with inbowing knees (varus)  Cor RRR no mRG  Lungs clear  Abd soft, benign  No rashes or edema  Nl mentation  No tremor  No acute synovitis of knees    A/P) 1) Depression. In remission. PHQ9 score of 0, GAD7 score of 0. Escitalopram refilled. Not interested in drug holiday.  2) Elevated fall risk with knee osteoarthritis pain. She is not interested in intervention (surgery/injection) at this time and PT would potentially provide best results in short/medium term. PT referral.  3) Low bone density. Last DEXA in 2013- T-1.5. Check DEXA. Continue calcium supplementation and vitamin D.   4) HTN. BP under control on lisinopril 10 mg/d. Refilled. BMP ordered.  5) ASA discontinued given change in guidelines with risk > benefit.  6) Hyperlipidemia. Continue statin- no SEs. Max RF reduction. Stress test recently normal.    30 minutes spent on the date of the encounter performing chart review, history and exam, documentation and further activities as noted above.    [place] Davenport MD  Primary Care Center  [facility]

## 2021-11-29 NOTE — PROGRESS NOTES
"Medicare Wellness Health Risk Assessment Questionnaire    What is your marital status?      Who lives in your household?  \"Only me\"    Does your home have loose rugs in the hallway:     No    Does your home have grab bars in the bathroom:    Yes     Does your home have handrails on the stairs?  Yes     Does your home have poorly lit areas?    No    How many times have you fallen in the last year?  Once    How many times have you been to the Emergency Room in the last year?  0    How many times have you been hospitalized in the last year?  0    Do you feel threatened or controlled by a partner, ex-partner or anyone in your life?   No    Has anyone hurt you physically, for example by pushing, hitting, slapping or kicking you or forcing you to have sex?   No    Are you sexually active?    No    If yes, with men, women, or both?    N/A    If yes, do you more than one current partner?   N/A    If yes, are you using condoms?    N/A    Have you had any sexually transmitted infections in the last year?   No    Do you have any sexual concerns?    No    Do you need help with the phone, transportation, shopping, preparing meals, housework, laundry, medications or managing money?   No    Have you noticed any hearing difficulties?   No    Do you wear hearing aids?   No    Have you seen a hearing professional such as an audiologist in the last 1 year?   No    Do you have vision difficulty?    No    Do you wear glasses or contacts?   Yes     Have you seen an eye doctor in the last 1 year?   Yes     Women: What year did you stop having periods (approximate age)?  \"40?\"    Women: Any vaginal bleeding in the last year?    No    Women: Have you ever had an abnormal Pap smear?   No    How many servings of fruits and vegetables do you eat a day?  2 cups    How often do you exercise in a week?  1/2 hour every day    What kind of exercise do you do?  Walking for 1/2 hour    Do you wear a seatbelt when driving or riding in a " vehicle?     Yes     Do you use tobacco?    No    Do you use e-cigarettes?    No    Do you use any other drugs?   No         Do you drink alcohol?   Yes     If you drink alcohol, how many drinks per week?  3 or 4    Over the last 2 weeks, how often have you been bothered by feeling down, depressed, or hopeless?  Not at all (0)    Over the last 2 weeks, how often have you had little interest or pleasure in doing things?  Not at all (0)     Have you completed an Advance Directives document?  Yes     If yes, have you given a copy to the clinic?   Yes     Do you need information on Advance Directives?   No      Kandace Almanza, EMT, at 4:46 PM on 11/29/2021.

## 2021-11-29 NOTE — NURSING NOTE
Lynnette Mata is a 86 year old female patient that presents today in clinic for the following:    Chief Complaint   Patient presents with     Physical     No specific concerns     The patient's allergies and medications were reviewed as noted. A set of vitals were recorded as noted without incident. The patient does not have any other questions for the provider.    Kandace Almanza, EMT at 12:52 PM on 11/29/2021

## 2021-11-30 ASSESSMENT — ANXIETY QUESTIONNAIRES: GAD7 TOTAL SCORE: 0

## 2021-11-30 ASSESSMENT — PATIENT HEALTH QUESTIONNAIRE - PHQ9: SUM OF ALL RESPONSES TO PHQ QUESTIONS 1-9: 0

## 2021-12-07 ENCOUNTER — HOSPITAL ENCOUNTER (OUTPATIENT)
Dept: PHYSICAL THERAPY | Facility: CLINIC | Age: 86
Setting detail: THERAPIES SERIES
End: 2021-12-07
Attending: INTERNAL MEDICINE
Payer: MEDICARE

## 2021-12-07 DIAGNOSIS — Z91.81 PERSONAL HISTORY OF FALL: ICD-10-CM

## 2021-12-07 DIAGNOSIS — M62.81 GENERALIZED MUSCLE WEAKNESS: ICD-10-CM

## 2021-12-07 DIAGNOSIS — M17.10 ARTHRITIS OF KNEE: ICD-10-CM

## 2021-12-07 PROCEDURE — 97161 PT EVAL LOW COMPLEX 20 MIN: CPT | Mod: GP | Performed by: PHYSICAL THERAPIST

## 2021-12-07 PROCEDURE — 97110 THERAPEUTIC EXERCISES: CPT | Mod: GP | Performed by: PHYSICAL THERAPIST

## 2021-12-07 NOTE — PROGRESS NOTES
[facility]                                                                                   OUTPATIENT PHYSICAL THERAPY FUNCTIONAL EVALUATION  PLAN OF TREATMENT FOR OUTPATIENT REHABILITATION  (COMPLETE FOR INITIAL CLAIMS ONLY)  Patient's Last Name, First Name, M.I.  YOB: 1935  Lynnette Mata     Provider's Name   [facility]   Medical Record No.  [id number]     Start of Care Date:  12/07/21   Onset Date:  11/29/21 (Order date)   Type:     _X__PT   ____OT  ____SLP Medical Diagnosis:  Personal h/o fall, generalized muscle weakness, arthritis of knee     PT Diagnosis:  Force production deficit Visits from SOC:  1                              __________________________________________________________________________________  Plan of Treatment/Functional Goals:  balance training,gait training,joint mobilization,motor coordination training,neuromuscular re-education,orthotic fitting/training,ROM,strengthening,stretching,transfer training,manual therapy     Cryotherapy,Electrical Stimulation/Russion Stimulation     GOALS  TUG  Pt will improve her TUG score to <20 seconds using bilat walking poles to improve her safety and reduce her fall risk.   02/19/22    Gait velocity  Pt will improve self selected walking speed to 0.58 m/sec with bilat walking poles to improve home and community mobility.   02/19/22    HEP  Pt will demonstrate independent performance of her updated home exercise program to maximize her ability to self manage her condition.   02/19/22                                                           Therapy Frequency:  1 time/week   Predicted Duration of Therapy Intervention:  75 days    Caterina Healy, PT                                    I CERTIFY THE NEED FOR THESE SERVICES FURNISHED UNDER        THIS PLAN OF TREATMENT AND WHILE UNDER MY CARE      (Physician co-signature of this document indicates review and certification of the therapy plan).                Certification Date From:  12/07/21   Certification Date To:  02/19/22    Referring Provider:  Nickolas Davenport MD    Initial Assessment  See Epic Evaluation- Start of Care Date: 12/07/21

## 2021-12-07 NOTE — PROGRESS NOTES
12/07/21 1300   Signing Clinician's Name / Credentials   Signing clinician's name / credentials Caterina Healy PT, DPT, NCS, ATP   Session Number   Session Number 1 - Medicare/[employer] Medicare   Progress Note/Recertification   Recertification Due Date 02/19/22   Adult Goals   PT Eval Goals 1;2;3   Goal 1   Goal Identifier TUG   Goal Description Pt will improve her TUG score to <20 seconds using bilat walking poles to improve her safety and reduce her fall risk.    Target Date 02/19/22   Goal 2   Goal Identifier Gait velocity   Goal Description Pt will improve self selected walking speed to 0.58 m/sec with bilat walking poles to improve home and community mobility.    Target Date 02/19/22   Goal 3   Goal Identifier HEP   Goal Description Pt will demonstrate independent performance of her updated home exercise program to maximize her ability to self manage her condition.    Target Date 02/19/22   Subjective Report   Subjective Report see initial eval documentation   Vitals Signs   Heart Rate 55   SpO2 95   Blood Pressure 134/57   Vital Signs Comments Taken LUE, pt seated.    Treatment Interventions   Interventions Therapeutic Procedure/Exercise   Therapeutic Procedure/exercise   Therapeutic Procedures: strength, endurance, ROM, flexibillity minutes (35199) 15   Skilled Intervention new HEP   Patient Response Verbalized/demonstrated understanding.    Treatment Detail Bridging combined with TA activation x8, add hold in bridge position and knee pulses x5. Discussed resuming NuStep at her apartment complex for aerobic training. Also instructed seated hip abduction with green theraband, progress to blue as tolerated. Handouts issued.    Education   Learner Patient   Readiness Acceptance   Method Booklet/handout;Explanation;Demonstration   Response Verbalizes Understanding;Demonstrates Understanding   Education Comments PT POC, update HEP   Plan   Homework Keep walking, resume NuStep at home   Home program Jaret  with knee pulses, seated hip abduction with theraband.    Plan for next session 5xSTS and FGA baselines, set goals as appropriate. Progress HEP. Progress strengthening with strong emphasis on core and proximal LE musculature. Dynamic balance training.    Total Session Time   Timed Code Treatment Minutes 15   Total Treatment Time (sum of timed and untimed services) 60   AMBULATORY CLINICS ONLY-MEDICAL AND TREATMENT DIAGNOSIS   Medical Diagnosis Personal h/o fall, generalized muscle weakness, arthritis of knee   PT Diagnosis Force production deficit     Caterina Healy, PT, DPT, NCS, ATP  Physical Therapist  [facility]  [address]  [address] [place] [address]  [email or web address]  [phone]  Fax: [phone]

## 2021-12-16 NOTE — TELEPHONE ENCOUNTER
Lynnette was evaluated in clinic by Dr. Davenport on 12/16/19 for depression. Escitalopram started with recommendation to follow up with Dr. Lebron.    Patient is scheduled to see Dr. Lebron in February. Dr. Lebron recommended patient also attend a day treatment program to which Dr. Davenport agreed and a referral was placed.     I called Lynnette to discuss the day treatment program, determine if she is agreeable to this referral. We discussed that this is typically a 12-16 week program, 2 times a week for 4 hour sessions. Intake department would be able to answer further questions. Lynnette is willing to learn more about the program. Per the intake department, she should receive a call tomorrow or Monday. Lynnette voiced understanding that she would receive a call.    Virginie Sheehan RN ([place])  
No

## 2022-01-06 ENCOUNTER — HOSPITAL ENCOUNTER (OUTPATIENT)
Dept: PHYSICAL THERAPY | Facility: CLINIC | Age: 87
Setting detail: THERAPIES SERIES
End: 2022-01-06
Attending: INTERNAL MEDICINE
Payer: MEDICARE

## 2022-01-06 PROCEDURE — 97110 THERAPEUTIC EXERCISES: CPT | Mod: GP | Performed by: PHYSICAL THERAPIST

## 2022-01-11 ENCOUNTER — HOSPITAL ENCOUNTER (OUTPATIENT)
Dept: PHYSICAL THERAPY | Facility: CLINIC | Age: 87
Setting detail: THERAPIES SERIES
End: 2022-01-11
Attending: INTERNAL MEDICINE
Payer: MEDICARE

## 2022-01-11 PROCEDURE — 97750 PHYSICAL PERFORMANCE TEST: CPT | Mod: GP | Performed by: PHYSICAL THERAPIST

## 2022-01-11 PROCEDURE — 97110 THERAPEUTIC EXERCISES: CPT | Mod: GP | Performed by: PHYSICAL THERAPIST

## 2022-01-11 PROCEDURE — 97140 MANUAL THERAPY 1/> REGIONS: CPT | Mod: GP | Performed by: PHYSICAL THERAPIST

## 2022-01-11 NOTE — PROGRESS NOTES
01/11/22 1300   Signing Clinician's Name / Credentials   Signing clinician's name / credentials Caterina Healy PT, DPT, NCS, ATP   Functional Gait Assessment (HEATH Car, ROSENDO Vance, et al. (2004))   1. GAIT LEVEL SURFACE 1  (using 2 walking poles)   2. CHANGE IN GAIT SPEED 1  (2 walking poles)   3. GAIT WITH HORIZONTAL HEAD TURNS 2  (2 walking poles)   4. GAIT WITH VERTICAL HEAD TURNS 2  (2 walking poles)   5. GAIT AND PIVOT TURN 2  (2 walking poles)   6. STEP OVER OBSTACLE 1  (2 walking poles)   7. GAIT WITH NARROW BASE OF SUPPORT 0  (Requires walking poles)   8. GAIT WITH EYES CLOSED 1  (2 walking poles, slow)   9. AMBULATING BACKWARDS 1  (2 walking poles)   10. STEPS 1   Total Functional Gait Assessment Score   TOTAL SCORE: (MAXIMUM SCORE 30) 12   Functional Gait Assessment (FGA): The FGA assesses postural stability during various walking tasks.   Gait assistive device used: Bilat walking poles    Patient Score: 12/30  Scores of <22/30 have been correlated with predicting falls in community-dwelling older adults according to Josep & Tai 2010.   Scores of <18/30 have been correlated with increased risk for falls in patients with Parkinsons Disease according to RosasMaximo, Waddell et al 2014.  Minimal Detectable Change for patients with acute/chronic stroke = 4.2 according to Carlota & Floryschrichard 2009  Minimal Detectable Change for patients with vestibular disorder = 8 according to Josep & Tai 2010    Assessment (rationale for performing, application to patient s function & care plan): Baseline status from which to monitor progress.   Minutes billed as physical performance test: 24 (including 5XSTS also)

## 2022-01-18 ENCOUNTER — HOSPITAL ENCOUNTER (OUTPATIENT)
Dept: PHYSICAL THERAPY | Facility: CLINIC | Age: 87
Setting detail: THERAPIES SERIES
End: 2022-01-18
Attending: INTERNAL MEDICINE
Payer: MEDICARE

## 2022-01-18 PROCEDURE — 97140 MANUAL THERAPY 1/> REGIONS: CPT | Mod: GP | Performed by: PHYSICAL THERAPIST

## 2022-01-18 PROCEDURE — 97110 THERAPEUTIC EXERCISES: CPT | Mod: GP | Performed by: PHYSICAL THERAPIST

## 2022-01-25 ENCOUNTER — HOSPITAL ENCOUNTER (OUTPATIENT)
Dept: PHYSICAL THERAPY | Facility: CLINIC | Age: 87
Setting detail: THERAPIES SERIES
End: 2022-01-25
Attending: INTERNAL MEDICINE
Payer: MEDICARE

## 2022-01-25 PROCEDURE — 97110 THERAPEUTIC EXERCISES: CPT | Mod: GP | Performed by: PHYSICAL THERAPIST

## 2022-02-01 ENCOUNTER — HOSPITAL ENCOUNTER (OUTPATIENT)
Dept: PHYSICAL THERAPY | Facility: CLINIC | Age: 87
Setting detail: THERAPIES SERIES
End: 2022-02-01
Attending: INTERNAL MEDICINE
Payer: MEDICARE

## 2022-02-01 PROCEDURE — 97116 GAIT TRAINING THERAPY: CPT | Mod: GP | Performed by: PHYSICAL THERAPIST

## 2022-02-01 PROCEDURE — 97110 THERAPEUTIC EXERCISES: CPT | Mod: GP | Performed by: PHYSICAL THERAPIST

## 2022-02-08 ENCOUNTER — HOSPITAL ENCOUNTER (OUTPATIENT)
Dept: PHYSICAL THERAPY | Facility: CLINIC | Age: 87
Setting detail: THERAPIES SERIES
End: 2022-02-08
Attending: INTERNAL MEDICINE
Payer: MEDICARE

## 2022-02-08 PROCEDURE — 97110 THERAPEUTIC EXERCISES: CPT | Mod: GP | Performed by: PHYSICAL THERAPIST

## 2022-02-15 ENCOUNTER — HOSPITAL ENCOUNTER (OUTPATIENT)
Dept: PHYSICAL THERAPY | Facility: CLINIC | Age: 87
Setting detail: THERAPIES SERIES
End: 2022-02-15
Attending: INTERNAL MEDICINE
Payer: MEDICARE

## 2022-02-15 PROCEDURE — 97116 GAIT TRAINING THERAPY: CPT | Mod: GP | Performed by: PHYSICAL THERAPIST

## 2022-02-15 PROCEDURE — 97110 THERAPEUTIC EXERCISES: CPT | Mod: GP | Performed by: PHYSICAL THERAPIST

## 2022-02-15 NOTE — PROGRESS NOTES
[facility]    OUTPATIENT PHYSICAL THERAPY  PLAN OF TREATMENT FOR OUTPATIENT REHABILITATION AND PROGRESS NOTE           Patient's Last Name, First Name, Lynnette Dumont Date of Birth  1935   Provider's Name  [facility] Medical Record No.  [id number]    Onset Date  11/29/21 (order date) Start of Care Date  12/7/21   Type:     _X_PT   ___OT   ___SLP Medical Diagnosis  Personal h/o fall, generalized muscle weakness, arthritis of knee   PT Diagnosis  Force production deficit Plan of Treatment  Frequency/Duration: 1x/week  Certification date from 2/15/22 to 3/18/22     Goals:  Goal Identifier TUG   Goal Description Pt will improve her TUG score to <20 seconds using bilat walking poles to improve her safety and reduce her fall risk.    Target Date 03/18/22   Date Met      Progress (detail required for progress note): 2/15/22: Improved from 26.81 seconds at initial eval to 24.03 sec today.      Goal Identifier Gait velocity   Goal Description Pt will improve self selected walking speed to 0.58 m/sec with bilat walking poles to improve home and community mobility.    Target Date 02/19/22   Date Met  02/15/22   Progress (detail required for progress note): 2/15/22: 0.60 m/sec - MET     Goal Identifier HEP   Goal Description Pt will demonstrate independent performance of her updated home exercise program to maximize her ability to self manage her condition.    Target Date 02/19/22   Date Met  02/15/22   Progress (detail required for progress note):  MET       Beginning/End Dates of Progress Note Reporting Period:  12/17/21 to 2/15/22    Progress Toward Goals:   Progress this reporting period: Patient has improved her self-selected gait speed to meet her goal. Her TUG score has also improved but she has not yet fully achieved this goal. She is independent with  her home program. Patient has trialed bilateral knee offloading braces in the clinic, as pt has limited tolerance for weight bearing strengthening due to significant lower leg pain due to significant bilat knee valgus and arthritis. She will be using these at home and will report back on her response.     Client Self (Subjective) Report for Progress Note Reporting Period: Nothing new, doing well. Usual amount of pain in legs today.       Objective Measurements:   Objective Measure: TUG  Details: 24.03 seconds with bilat hiking poles (improved from 26.81 sec at initial eval)  Objective Measure: self selected 10mwt  Details: 0.60 m/sec with bilat hiking poles (improved from 0.48 m/sec at initial eval)     PLAN: continue 1x/week for another month to finalize HEP and assess pt's longer term response to the new offloading knee braces.              I CERTIFY THE NEED FOR THESE SERVICES FURNISHED UNDER        THIS PLAN OF TREATMENT AND WHILE UNDER MY CARE     (Physician co-signature of this document indicates review and certification of the therapy plan).                Referring Provider: Nickolas Healy, PT

## 2022-03-01 ENCOUNTER — HOSPITAL ENCOUNTER (OUTPATIENT)
Dept: PHYSICAL THERAPY | Facility: CLINIC | Age: 87
Setting detail: THERAPIES SERIES
End: 2022-03-01
Attending: INTERNAL MEDICINE
Payer: MEDICARE

## 2022-03-01 PROCEDURE — 97110 THERAPEUTIC EXERCISES: CPT | Mod: GP | Performed by: PHYSICAL THERAPIST

## 2022-03-07 ENCOUNTER — OFFICE VISIT (OUTPATIENT)
Dept: INTERNAL MEDICINE | Facility: CLINIC | Age: 87
End: 2022-03-07
Payer: MEDICARE

## 2022-03-07 VITALS
SYSTOLIC BLOOD PRESSURE: 128 MMHG | WEIGHT: 162.7 LBS | HEIGHT: 67 IN | OXYGEN SATURATION: 95 % | BODY MASS INDEX: 25.54 KG/M2 | HEART RATE: 56 BPM | DIASTOLIC BLOOD PRESSURE: 72 MMHG | RESPIRATION RATE: 16 BRPM

## 2022-03-07 DIAGNOSIS — I10 ESSENTIAL HYPERTENSION, BENIGN: ICD-10-CM

## 2022-03-07 DIAGNOSIS — R26.89 BALANCE PROBLEMS: ICD-10-CM

## 2022-03-07 DIAGNOSIS — E78.01 FAMILIAL HYPERCHOLESTEROLEMIA: ICD-10-CM

## 2022-03-07 DIAGNOSIS — Z91.81 PERSONAL HISTORY OF FALL: ICD-10-CM

## 2022-03-07 DIAGNOSIS — F32.1 MODERATE MAJOR DEPRESSION (H): Primary | ICD-10-CM

## 2022-03-07 DIAGNOSIS — M62.81 GENERALIZED MUSCLE WEAKNESS: ICD-10-CM

## 2022-03-07 PROCEDURE — 99214 OFFICE O/P EST MOD 30 MIN: CPT | Performed by: INTERNAL MEDICINE

## 2022-03-07 NOTE — PATIENT INSTRUCTIONS
Thank you for visiting the Primary Care Center today at the [facility]! The following is some information about our clinic:     Primary Care Center Frequently-Asked Questions    (1) How do I schedule appointments at the [facility]?     Primary Care--to schedule or make changes to an existing appointment, please call our primary care line at [phone].  Labs--to schedule a lab appointment at the [facility] you can use [employer] or call [phone]. If you have a [place] location that is closer to home, you can reach out to that location for scheduling options.   Imaging--if you need to schedule a CT, X-ray, MRI, ultrasound, or other imaging study, you can call [phone] to schedule at the [facility] or any other [facility] imaging location.   Referrals--if a referral to another specialty was ordered you can expect a phone call from their scheduling team. If you have not heard from them in a week, please call us or send us a [employer] message to check the status or get a scheduling number. Please note that this only applies to internal [facility] referrals. If the referral is external you would need to contact their office for scheduling.     (2) I have a question about my visit, who do I contact?     You can call us at the primary care line at [phone] to ask questions about your visit. You can also send a secure message through [employer], which is reviewed by clinic staff. Please note that [employer] messages have a twenty-four to forty-eight business hour turnaround time and should not be used for urgent concerns.    (3) How will I get the results of my tests?    If you are signed up for [employer] all tests will be released to you within twenty-four hours of resulting. Please allow three to five days for your doctor to review your results and place a note interpreting the results. If you do not have [employer] you will receive your results  through mail seven to ten business days following the return of the tests. Please note that if there should be any urgent or concerning results that your doctor or their registered nurse will reach out to you the same day as the tests come back. If you have follow up questions about your results or would like to discuss the results in detail please schedule a follow up with your provider either in person or virtually.     (4) How do I get refills of my prescriptions?     You should always first contact your pharmacy for refills of your medications. If submitting a refill request on [employer], please be sure to submit the request only once--repeat requests can cause delays in refill. If you are requesting a NEW medication or a medication related to new symptoms you will need to schedule an appointment with a provider prior to approval. Please note: Routine medication refills have up to one to three business day turnaround whereas controlled substances refills have up to five to seven business day turnaround.    (5) I have new symptoms, what do I do?     If you are having an immediate medical emergency, you should dial 911 for assistance.   For anything urgent that needs to be seen within a few hours to one day you should visit a local urgent care for assistance.  For non-urgent symptoms that need to be seen within a few days to a week you can schedule with an available provider in primary care by going to [employer] or calling [phone].   If you are not sure how serious your symptoms are or you would like to receive medical advice you can always call [phone] to speak with a triage nurse.

## 2022-03-07 NOTE — PROGRESS NOTES
"S) Ms. Mata is an 85 yo with a history of HTN, hyperlipidemia, depression and falls seen for follow-up. Completing PT/Balance training- no falls but balance abnormal and some generalized weakness ongoing. Exercising in basement (walking) daily. Depression in remission on 10 mg escitalopram daily- doing well. ROS negative.    Past Medical History:   Diagnosis Date     Asymptomatic menopausal state age 45    Hx HRT use x 6+ years--off with [facility] study findings     Shingles outbreak 2/14/2013    Despite immunization at age 72!        Stricture and stenosis of cervix      Unspecified constipation     hx chronic     Current Outpatient Medications   Medication     Calcium Carbonate (CALCIUM 600 PO)     diclofenac (VOLTAREN) 1 % topical gel     diclofenac (VOLTAREN) 1 % topical gel     escitalopram (LEXAPRO) 10 MG tablet     fish oil-omega-3 fatty acids (FISH OIL) 1000 MG capsule     Glucosamine 500 MG CAPS     lisinopril (ZESTRIL) 10 MG tablet     Multiple Vitamin (MULTI-VITAMIN) per tablet     simvastatin (ZOCOR) 20 MG tablet     No current facility-administered medications for this visit.       O) /72 (BP Location: Right arm, Patient Position: Sitting, Cuff Size: Adult Regular)   Pulse 56   Resp 16   Ht 1.69 m (5' 6.54\")   Wt 73.8 kg (162 lb 11.2 oz)   SpO2 95%   Breastfeeding No   BMI 25.84 kg/m    Well appearing  Uses arms and leans forward slowly to rise from chair. Using walking sticks for added stability.  Nl mentation.  No tremor  Cor RRR. No MRG.  Nl lungs  No rashes.    A/P) 1) HTN. BP under good control on lisinopril 10 mg/d and BMP nl in November.  2) ASCVD risk. Ongoing statin for elevated risk. No SEs noted. Repeat lipids next visit.   3) Depression in remission on citalopram 10 mg/d. Doing quite well and enjoying outdoor activities with friends. Not interested in tapering.  4) Fall risk. Multifactorial. PT/Balance training completed tomorrow. Fall precautions.     30 minutes spent on the date " of the encounter performing chart review, history and exam, documentation and further activities as noted above.    [place] Davenport MD  Primary Care Center  [facility]

## 2022-03-07 NOTE — NURSING NOTE
"Lynnette Mata is a 86 year old female patient that presents today in clinic for the following:    Chief Complaint   Patient presents with     RECHECK     Follow-up--she reports no new health concerns at this time.      The patient's allergies and medications were reviewed as noted. A set of vitals were recorded as noted without incident: /72 (BP Location: Right arm, Patient Position: Sitting, Cuff Size: Adult Regular)   Pulse 56   Resp 16   Ht 1.69 m (5' 6.54\")   Wt 73.8 kg (162 lb 11.2 oz)   SpO2 95%   Breastfeeding No   BMI 25.84 kg/m  . The patient was asked if they had any of the following symptoms in the last forty-eight hours: (1) fever or chills, (2) cough, (3) shortness of breath or difficulty breathing, (4) fatigue, (5) muscle or body aches, (6) headache, (7) new loss of taste or smell, (8) sore throat, (9) congestion or runny nose, (10) nausea or vomiting, and (11) diarrhea. Lynnette Mata denies having any of the following symptoms in the last forty-eight hours: (1) fever or chills, (2) cough, (3) shortness of breath or difficulty breathing, (4) fatigue, (5) muscle or body aches, (6) headache, (7) new loss of taste or smell, (8) sore throat, (9) congestion or runny nose, (10) nausea or vomiting, and (11) diarrhea. The patient does not have any other questions for the provider.    Maynor Mora, EMT at 10:53 AM on 3/7/2022  Primary Care Center  [facility]  "

## 2022-03-08 ENCOUNTER — HOSPITAL ENCOUNTER (OUTPATIENT)
Dept: PHYSICAL THERAPY | Facility: CLINIC | Age: 87
Setting detail: THERAPIES SERIES
End: 2022-03-08
Attending: INTERNAL MEDICINE
Payer: MEDICARE

## 2022-03-08 PROCEDURE — 97110 THERAPEUTIC EXERCISES: CPT | Mod: GP | Performed by: PHYSICAL THERAPIST

## 2022-03-08 NOTE — PROGRESS NOTES
[facility]    Outpatient Physical Therapy Discharge Note  Patient: Lynnette Mata  : 1935    Beginning/End Dates of Reporting Period:  2021 to 3/8/2022    Referring Provider: Dr. Nickolas Davenport MD    Therapy Diagnosis: Force production deficit     Client Self Report: Saw Dr. Davenport recently. Injections for knees not discussed. Pt states she is fine. Most of her HEP is too easy. Still using knee braces but not every day.     Objective Measurements:  Objective Measure: TUG  Details: 2/15/22: 24.03 seconds with bilat hiking poles (improved from 26.81 sec at initial eval). Today: 26.84 sec. TUG has not improved, largely due to pt's difficulty with sit to stand to sit.   Objective Measure: self selected 10mwt  Details: 0.60 m/sec with bilat hiking poles (improved from 0.48 m/sec at initial eval) achieved 2/15/22, maintained today 3/8/22.       Goals:  Goal Identifier TUG   Goal Description Pt will improve her TUG score to <20 seconds using bilat walking poles to improve her safety and reduce her fall risk.    Target Date 22   Date Met   NOT MET   Progress (detail required for progress note): Progress 2/15/22: Improved from 26.81 seconds at initial eval to 24.03 sec.      Goal Identifier Gait velocity   Goal Description Pt will improve self selected walking speed to 0.58 m/sec with bilat walking poles to improve home and community mobility.    Target Date 22   Date Met  02/15/22   Progress (detail required for progress note): MET 2/15/22: 0.60 m/sec     Goal Identifier HEP   Goal Description Pt will demonstrate independent performance of her updated home exercise program to maximize her ability to self manage her condition.    Target Date 22   Date Met  22   Progress (detail required for progress note): MET 3/8/22     Two goals met as noted above. Patient continues to have  significant slowing of sit to stand to sit transitions, and requires hiking poles for stable walking. She is resuming activity after a period of being more sedentary due to the pandemic and not having access to the small gym in her building. She also has chronic significant bilateral knee valgus that creates altered moment arms and contributes to some of her mobility challenges. We have discussed the importance of her remaining active, and trying to resume low-impact activity such as the NuStep as she feels comfortable within her environment. Most of her interests are relatively sedentary, although she does enjoy traveling and hiking, which helps with activity levels.     Plan:  Discharge from therapy.    Discharge:    Reason for Discharge: Pt has made progress in her gait speed and with her HEP. She has resumed being more active since a period of being sedentary during the pandemic, including recently feeling comfortable enough to resume using the NuStep in her building. Mobility remains generally slow and stiff with progress somewhat limited by significant knee valgus and lower leg discomfort. Pt expressed feeling ready for discharge and to continue HEP on her own.      Equipment Issued: [place] knee offloading braces (bilateral)    Discharge Plan: Patient to continue home program. Referral back to PT as needed if issues arise, or for updated HEP in the future.     Caterina Healy, PT, DPT, NCS, ATP  Physical Therapist  [facility]  [address]  [address] [place] [address]  [email or web address]  [phone]  Fax: [phone]

## 2022-04-24 ENCOUNTER — HEALTH MAINTENANCE LETTER (OUTPATIENT)
Age: 87
End: 2022-04-24

## 2022-05-03 ENCOUNTER — IMMUNIZATION (OUTPATIENT)
Dept: NURSING | Facility: CLINIC | Age: 87
End: 2022-05-03
Payer: MEDICARE

## 2022-05-03 PROCEDURE — 0054A COVID-19,PF,PFIZER (12+ YRS): CPT

## 2022-05-03 PROCEDURE — 91305 COVID-19,PF,PFIZER (12+ YRS): CPT

## 2022-07-08 ENCOUNTER — ALLIED HEALTH/NURSE VISIT (OUTPATIENT)
Dept: INTERNAL MEDICINE | Facility: CLINIC | Age: 87
End: 2022-07-08
Payer: MEDICARE

## 2022-07-08 DIAGNOSIS — H61.23 BILATERAL IMPACTED CERUMEN: Primary | ICD-10-CM

## 2022-07-08 PROCEDURE — 69209 REMOVE IMPACTED EAR WAX UNI: CPT | Mod: 50

## 2022-07-08 NOTE — NURSING NOTE
[facility] at the request of Dr. Nickolas Davenport for an ear wash. The patient's ears were assessed for cerumen. After this, an ear wash was performed in which a moderate amount of impacted cerumen was removed from both ear/s; specifically, 250 mL of H2O/trace H2O2 were used to lavage the patient's left ear and 250 mL of H2O/trace H2O2 were used to lavage the patient's right ear. After the ear wash, the tympanic membrane was visible. The ear wash was provided today under the supervision of Dr. Eddie Orellana who was present if help was needed.    Christian White, EMT at 2:11 PM on 7/8/2022

## 2022-09-06 ENCOUNTER — HOSPITAL ENCOUNTER (EMERGENCY)
Facility: CLINIC | Age: 87
Discharge: HOME | End: 2022-09-06
Attending: EMERGENCY MEDICINE | Admitting: EMERGENCY MEDICINE
Payer: MEDICARE

## 2022-09-06 ENCOUNTER — APPOINTMENT (OUTPATIENT)
Dept: CT IMAGING | Facility: CLINIC | Age: 87
End: 2022-09-06
Attending: EMERGENCY MEDICINE
Payer: MEDICARE

## 2022-09-06 VITALS
HEART RATE: 58 BPM | RESPIRATION RATE: 16 BRPM | OXYGEN SATURATION: 95 % | TEMPERATURE: 98.8 F | WEIGHT: 160 LBS | DIASTOLIC BLOOD PRESSURE: 78 MMHG | SYSTOLIC BLOOD PRESSURE: 158 MMHG | BODY MASS INDEX: 26.66 KG/M2 | HEIGHT: 65 IN
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
## 2022-09-06 DIAGNOSIS — S09.90XA INJURY OF HEAD, INITIAL ENCOUNTER: ICD-10-CM

## 2022-09-06 LAB
ALBUMIN SERPL BCG-MCNC: 4 G/DL (ref 3.5–5.2)
ALP SERPL-CCNC: 53 U/L (ref 35–104)
ALT SERPL W P-5'-P-CCNC: 19 U/L (ref 10–35)
ANION GAP SERPL CALCULATED.3IONS-SCNC: 10 MMOL/L (ref 7–15)
AST SERPL W P-5'-P-CCNC: 22 U/L (ref 10–35)
BASOPHILS # BLD AUTO: 0 10E3/UL (ref 0–0.2)
BASOPHILS NFR BLD AUTO: 1 %
BILIRUB SERPL-MCNC: 0.2 MG/DL
BUN SERPL-MCNC: 23.1 MG/DL (ref 8–23)
CALCIUM SERPL-MCNC: 9.6 MG/DL (ref 8.8–10.2)
CHLORIDE SERPL-SCNC: 108 MMOL/L (ref 98–107)
CREAT SERPL-MCNC: 0.68 MG/DL (ref 0.51–0.95)
DEPRECATED HCO3 PLAS-SCNC: 24 MMOL/L (ref 22–29)
EOSINOPHIL # BLD AUTO: 0.2 10E3/UL (ref 0–0.7)
EOSINOPHIL NFR BLD AUTO: 4 %
ERYTHROCYTE [DISTWIDTH] IN BLOOD BY AUTOMATED COUNT: 13 % (ref 10–15)
GFR SERPL CREATININE-BSD FRML MDRD: 84 ML/MIN/1.73M2
GLUCOSE SERPL-MCNC: 99 MG/DL (ref 70–99)
HCT VFR BLD AUTO: 38 % (ref 35–47)
HGB BLD-MCNC: 12.8 G/DL (ref 11.7–15.7)
HOLD SPECIMEN: NORMAL
IMM GRANULOCYTES # BLD: 0 10E3/UL
IMM GRANULOCYTES NFR BLD: 0 %
LYMPHOCYTES # BLD AUTO: 1.7 10E3/UL (ref 0.8–5.3)
LYMPHOCYTES NFR BLD AUTO: 35 %
MCH RBC QN AUTO: 31.6 PG (ref 26.5–33)
MCHC RBC AUTO-ENTMCNC: 33.7 G/DL (ref 31.5–36.5)
MCV RBC AUTO: 94 FL (ref 78–100)
MONOCYTES # BLD AUTO: 0.7 10E3/UL (ref 0–1.3)
MONOCYTES NFR BLD AUTO: 14 %
NEUTROPHILS # BLD AUTO: 2.3 10E3/UL (ref 1.6–8.3)
NEUTROPHILS NFR BLD AUTO: 46 %
NRBC # BLD AUTO: 0 10E3/UL
NRBC BLD AUTO-RTO: 0 /100
PLATELET # BLD AUTO: 249 10E3/UL (ref 150–450)
POTASSIUM SERPL-SCNC: 4.4 MMOL/L (ref 3.4–5.3)
PROT SERPL-MCNC: 6.8 G/DL (ref 6.4–8.3)
RBC # BLD AUTO: 4.05 10E6/UL (ref 3.8–5.2)
SODIUM SERPL-SCNC: 142 MMOL/L (ref 136–145)
WBC # BLD AUTO: 5 10E3/UL (ref 4–11)

## 2022-09-06 PROCEDURE — G1010 CDSM STANSON: HCPCS

## 2022-09-06 PROCEDURE — 99285 EMERGENCY DEPT VISIT HI MDM: CPT | Mod: 25 | Performed by: EMERGENCY MEDICINE

## 2022-09-06 PROCEDURE — 99284 EMERGENCY DEPT VISIT MOD MDM: CPT | Performed by: EMERGENCY MEDICINE

## 2022-09-06 PROCEDURE — 80053 COMPREHEN METABOLIC PANEL: CPT | Performed by: EMERGENCY MEDICINE

## 2022-09-06 PROCEDURE — G1010 CDSM STANSON: HCPCS | Mod: GC | Performed by: RADIOLOGY

## 2022-09-06 PROCEDURE — 85004 AUTOMATED DIFF WBC COUNT: CPT | Performed by: EMERGENCY MEDICINE

## 2022-09-06 PROCEDURE — 72125 CT NECK SPINE W/O DYE: CPT | Mod: 26 | Performed by: RADIOLOGY

## 2022-09-06 PROCEDURE — 85025 COMPLETE CBC W/AUTO DIFF WBC: CPT | Performed by: EMERGENCY MEDICINE

## 2022-09-06 PROCEDURE — 70450 CT HEAD/BRAIN W/O DYE: CPT | Mod: 26 | Performed by: RADIOLOGY

## 2022-09-06 PROCEDURE — 36415 COLL VENOUS BLD VENIPUNCTURE: CPT | Performed by: EMERGENCY MEDICINE

## 2022-09-06 ASSESSMENT — ENCOUNTER SYMPTOMS
VOMITING: 0
HEADACHES: 1
DIFFICULTY URINATING: 0
COUGH: 0
CHILLS: 0
WEAKNESS: 0
PALPITATIONS: 0
NECK PAIN: 0
SORE THROAT: 0
FEVER: 0
NAUSEA: 0
ARTHRALGIAS: 1
COLOR CHANGE: 0
DIARRHEA: 0
ABDOMINAL PAIN: 0
SHORTNESS OF BREATH: 0
CONFUSION: 0

## 2022-09-06 ASSESSMENT — ACTIVITIES OF DAILY LIVING (ADL): ADLS_ACUITY_SCORE: 33

## 2022-09-07 NOTE — ED PROVIDER NOTES
ED Provider Note  [facility]      History     Chief Complaint   Patient presents with     Fall     HPI  Lynnette Mata is a 87 year old female who presents to the emergency department today after falling.  Patient reports that she was getting out of the vehicle, she turned and lost her balance and fell, striking the left side of her head on the curb.  She did not lose consciousness.  She was able to get up with help. At baseline, she uses walking sticks/does urban pole walking and gets around quite well at baseline.  This fall happened approximately an hour prior to arrival.  She has a mild headache which is improving.  She is not on anticoagulation.  Denies neck pain, denies chest or back pain.  No shortness of breath or cough.  No abdominal pain.  No nausea, vomiting, or diarrhea.  No arm pain or leg pain.  No fevers or chills, no nasal congestion or sore throat.    Past Medical History:   Diagnosis Date     Asymptomatic menopausal state age 45    Hx HRT use x 6+ years--off with [facility] study findings     Shingles outbreak 2/14/2013    Despite immunization at age 72!        Stricture and stenosis of cervix      Unspecified constipation     hx chronic       Past Surgical History:   Procedure Laterality Date     COLONOSCOPY  4.15.09    (Fiberoptic) WNL -- ami in 10 yrs       Family History   Problem Relation Age of Onset     Myocardial Infarction Mother 74     Cerebrovascular Disease Maternal Grandmother 74     Cancer - colorectal Maternal Aunt 68     Endocrine Disease Brother 10        Type I Diabetes Mellitus     Psychotic Disorder Brother         bipolar     C.A.D. Brother      C.A.D. Father      Psychotic Disorder Father 49        suicide     Diabetes Maternal Grandfather 65        DMII     Parkinsonism Other                Social History     Tobacco Use     Smoking status: Never Smoker     Smokeless tobacco: Never Used   Substance Use Topics     Alcohol use: Yes      Alcohol/week: 0.0 standard drinks     Comment: 1 d; <3x week / week MAX,          Past Medical History  Past Medical History:   Diagnosis Date     Asymptomatic menopausal state age 45    Hx HRT use x 6+ years--off with  study findings     Shingles outbreak 2/14/2013    Despite immunization at age 72!        Stricture and stenosis of cervix      Unspecified constipation     hx chronic     Past Surgical History:   Procedure Laterality Date     COLONOSCOPY  4.15.09    (Fiberoptic) WNL -- ami in 10 yrs     Calcium Carbonate (CALCIUM 600 PO)  diclofenac (VOLTAREN) 1 % topical gel  diclofenac (VOLTAREN) 1 % topical gel  escitalopram (LEXAPRO) 10 MG tablet  fish oil-omega-3 fatty acids (FISH OIL) 1000 MG capsule  Glucosamine 500 MG CAPS  lisinopril (ZESTRIL) 10 MG tablet  Multiple Vitamin (MULTI-VITAMIN) per tablet  simvastatin (ZOCOR) 20 MG tablet      No Known Allergies  Family History  Family History   Problem Relation Age of Onset     Myocardial Infarction Mother 74     Cerebrovascular Disease Maternal Grandmother 74     Cancer - colorectal Maternal Aunt 68     Endocrine Disease Brother 10        Type I Diabetes Mellitus     Psychotic Disorder Brother         bipolar     C.A.D. Brother      C.A.D. Father      Psychotic Disorder Father 49        suicide     Diabetes Maternal Grandfather 65        DMII     Parkinsonism Other              Social History   Social History     Tobacco Use     Smoking status: Never Smoker     Smokeless tobacco: Never Used   Substance Use Topics     Alcohol use: Yes     Alcohol/week: 0.0 standard drinks     Comment: 1 d; <3x week / week MAX,      Drug use: No      Past medical history, past surgical history, medications, allergies, family history, and social history were reviewed with the patient. No additional pertinent items.       Review of Systems   Constitutional: Negative for chills and fever.   HENT: Negative for congestion and sore throat.    Respiratory: Negative for cough  "and shortness of breath.    Cardiovascular: Negative for chest pain and palpitations.   Gastrointestinal: Negative for abdominal pain, diarrhea, nausea and vomiting.   Genitourinary: Negative for difficulty urinating.   Musculoskeletal: Positive for arthralgias. Negative for neck pain.        Mild baseline arthralgias   Skin: Negative for color change.   Neurological: Positive for headaches. Negative for weakness.   Psychiatric/Behavioral: Negative for confusion.   All other systems reviewed and are negative.    A complete review of systems was performed with pertinent positives and negatives noted in the HPI, and all other systems negative.    Physical Exam   BP: (!) 158/78  Pulse: 58  Temp: 98.8  F (37.1  C)  Resp: 16  Height: 165.1 cm (5' 5\")  Weight: 72.6 kg (160 lb)  SpO2: 95 %  Physical Exam  Vitals and nursing note reviewed.   Constitutional:       General: She is not in acute distress.     Appearance: She is well-developed. She is not diaphoretic.      Comments: Pleasant elderly female, alert, cooperative, no acute distress   HENT:      Head: Normocephalic.      Comments: Approximately egg sized hematoma over L temporal region     Mouth/Throat:      Mouth: Mucous membranes are moist.      Pharynx: Oropharynx is clear.   Eyes:      Conjunctiva/sclera: Conjunctivae normal.      Pupils: Pupils are equal, round, and reactive to light.   Neck:      Comments: No posterior C spine TTP  Cardiovascular:      Rate and Rhythm: Normal rate and regular rhythm.      Heart sounds: Normal heart sounds.   Pulmonary:      Effort: Pulmonary effort is normal. No respiratory distress.      Breath sounds: Normal breath sounds. No wheezing or rales.   Abdominal:      General: Bowel sounds are normal. There is no distension.      Palpations: Abdomen is soft.      Tenderness: There is no abdominal tenderness.   Musculoskeletal:      Cervical back: Normal range of motion and neck supple.      Comments: No TTP of B UE or B LE.  Normal " PROM.    Skin:     General: Skin is warm and dry.   Neurological:      General: No focal deficit present.      Mental Status: She is alert and oriented to person, place, and time.      GCS: GCS eye subscore is 4. GCS verbal subscore is 5. GCS motor subscore is 6.      Cranial Nerves: Cranial nerves 2-12 are intact.      Sensory: Sensation is intact.      Motor: Motor function is intact.      Coordination: Coordination is intact.         ED Course      Procedures       The medical record was reviewed and interpreted.  Current labs reviewed and interpreted.              Results for orders placed or performed during the hospital encounter of 09/06/22   Head CT w/o contrast     Status: None    Narrative    EXAM: CT HEAD W/O CONTRAST  9/6/2022 7:55 PM     HISTORY:  head injury       COMPARISON:  Head CT 12/28/2017    TECHNIQUE: Using multidetector thin collimation helical acquisition  technique, axial, coronal and sagittal CT images from the skull base  to the vertex were obtained without intravenous contrast.   (topogram) image(s) also obtained and reviewed.    FINDINGS:  No intracranial hemorrhage, mass effect, or midline shift. No acute  loss of gray-white matter differentiation in the cerebral hemispheres.  Ventricles are proportionate to the cerebral sulci. Clear basal  cisterns. Coarse calcifications along the anterior falx. Mild to  moderate diffuse cerebral atrophy.    Small left frontal scalp hematoma without underlying osseous defect.  The bony calvaria and the bones of the skull base are normal. The  visualized portions of the paranasal sinuses and mastoid air cells are  clear. Grossly normal orbits.       Impression    IMPRESSION:   1. No acute intracranial pathology.   2. Small left frontal scalp hematoma without underlying osseous  defect.    I have personally reviewed the examination and initial interpretation  and I agree with the findings.    GORDON CORRAL MD         SYSTEM ID:  [id number]    Cervical spine CT w/o contrast     Status: None    Narrative    CT CERVICAL SPINE W/O CONTRAST 9/6/2022 7:58 PM    Provided History: trauma, eval for fx, trauma, eval for fx    Comparison: None    Technique: Using multidetector thin collimation helical acquisition  technique, axial, coronal and sagittal CT images through the cervical  spine were obtained without intravenous contrast.     Findings:  There is grade 1 retrolisthesis of C4 on C5. Straightening of the  expected cervical lordosis. No acute fracture or subluxation. No  prevertebral edema. There is multilevel disc height narrowing ,  moderate to severe at C3-4 and C6-7, and severe at C4-C6. With  ankylosis posteriorly at C3-4 and questionable ankylosis of C4-5. The  findings on a level by level basis are as follows:    C2-3: No spinal canal or neural foraminal stenosis. Facet arthropathy  bilaterally.    C3-4:  Disc bulge, uncinate hypertrophy creating mild right neural  foraminal stenosis. No spinal canal stenosis.    C4-5:  Disc bulge, uncinate hypertrophy with resulting moderate spinal  canal and moderate bilateral neural foraminal stenosis.    C5-6:  Disc bulge, uncinate hypertrophy. Mild spinal canal stenosis.  Moderate bilateral neural foraminal stenosis.    C6-7:  Disc bulge, uncinate hypertrophy and mild to moderate left and  mild right neural foraminal stenosis. Spinal canal is patent.    C7-T1:  No spinal canal or neural foraminal stenosis.     No abnormality of the paraspinous soft tissues. The visualized lung  apices are clear.      Impression    Impression:   1. No acute fracture or traumatic subluxation.  2. Multilevel cervical spondylosis as described in the findings.    I have personally reviewed the examination and initial interpretation  and I agree with the findings.    GORDON CORRAL MD         SYSTEM ID:  [id number]   [place] Draw     Status: None (In process)    Narrative    The following orders were created for panel order [place]  Draw.  Procedure                               Abnormality         Status                     ---------                               -----------         ------                     Extra Blue Top Tube[[id number]]                              In process                 Extra Red Top Tube[[id number]]                               In process                 Extra Green Top (Lithium...[[id number]]                      In process                 Extra Purple Top Tube[[id number]]                            In process                   Please view results for these tests on the individual orders.   Comprehensive metabolic panel     Status: Abnormal   Result Value Ref Range    Sodium 142 136 - 145 mmol/L    Potassium 4.4 3.4 - 5.3 mmol/L    Creatinine 0.68 0.51 - 0.95 mg/dL    Urea Nitrogen 23.1 (H) 8.0 - 23.0 mg/dL    Chloride 108 (H) 98 - 107 mmol/L    Carbon Dioxide (CO2) 24 22 - 29 mmol/L    Anion Gap 10 7 - 15 mmol/L    Glucose 99 70 - 99 mg/dL    Calcium 9.6 8.8 - 10.2 mg/dL    Protein Total 6.8 6.4 - 8.3 g/dL    Albumin 4.0 3.5 - 5.2 g/dL    Bilirubin Total 0.2 <=1.2 mg/dL    Alkaline Phosphatase 53 35 - 104 U/L    AST 22 10 - 35 U/L    ALT 19 10 - 35 U/L    GFR Estimate 84 >60 mL/min/1.73m2   CBC with platelets and differential     Status: None   Result Value Ref Range    WBC Count 5.0 4.0 - 11.0 10e3/uL    RBC Count 4.05 3.80 - 5.20 10e6/uL    Hemoglobin 12.8 11.7 - 15.7 g/dL    Hematocrit 38.0 35.0 - 47.0 %    MCV 94 78 - 100 fL    MCH 31.6 26.5 - 33.0 pg    MCHC 33.7 31.5 - 36.5 g/dL    RDW 13.0 10.0 - 15.0 %    Platelet Count 249 150 - 450 10e3/uL    % Neutrophils 46 %    % Lymphocytes 35 %    % Monocytes 14 %    % Eosinophils 4 %    % Basophils 1 %    % Immature Granulocytes 0 %    NRBCs per 100 WBC 0 <1 /100    Absolute Neutrophils 2.3 1.6 - 8.3 10e3/uL    Absolute Lymphocytes 1.7 0.8 - 5.3 10e3/uL    Absolute Monocytes 0.7 0.0 - 1.3 10e3/uL    Absolute Eosinophils 0.2 0.0 - 0.7 10e3/uL    Absolute Basophils 0.0  0.0 - 0.2 10e3/uL    Absolute Immature Granulocytes 0.0 <=0.4 10e3/uL    Absolute NRBCs 0.0 10e3/uL   CBC with platelets differential     Status: None    Narrative    The following orders were created for panel order CBC with platelets differential.  Procedure                               Abnormality         Status                     ---------                               -----------         ------                     CBC with platelets and d...[[id number]]                      Final result                 Please view results for these tests on the individual orders.     Medications - No data to display     Assessments & Plan (with Medical Decision Making)   Patient presents to the ED today with c/o fall.  She has evidence of head trauma with a hematoma over the left temporal region.  She is alert, answering questions appropriately, nonfocal neuro exam.  Need to consider possibility of intracranial injury given fall, age, and location of hematoma.  Additionally, given age, C-spine injury still needs to be considered.  Although this sounds like a mechanical fall, also need to consider the possibility of contributing medical problem.  Therefore we did draw basic labs.  CBC WNL, CMP shows normal electrolytes, with the exception of chloride of 108, BUN of 23, otherwise completely within normal limits.  Head CT shows no acute intracranial pathology, there is a small left frontal scalp hematoma without underlying osseous defect, C spine CT shows no acute fracture or traumatic subluxation, but multilevel cervical spondylolysis.    As the patient is feeling fine, I think it is reasonable to discharge her at this time.  I have advised an ice pack as well as Tylenol to help with headache at home.  Patient is really quite fit and quite sharp for her age.  Instructed her to follow-up with primary clinic as needed.  Patient verbalizes understanding.    I have reviewed the nursing notes. I have reviewed the findings, diagnosis,  plan and need for follow up with the patient.    New Prescriptions    No medications on file       Final diagnoses:   Fall, initial encounter   Injury of head, initial encounter       --  Petra Ferris MD  [facility] EMERGENCY DEPARTMENT  9/6/2022     Petra Ferris MD  09/07/22 0035

## 2022-09-07 NOTE — DISCHARGE INSTRUCTIONS
You have been seen in the emergency department today after falling.  Your CT scan does not show any sign of bleeding in the brain.  Your neck CT is normal, there is no sign of any broken bones.  Your lab work today was normal.  We recommend using an ice pack as well as Tylenol to help with pain.  Follow-up with your primary clinic as needed.

## 2022-09-07 NOTE — ED TRIAGE NOTES
Turned to get out of the car, stepped wrong, and fell.  Hit head on the pavement  Goose egg to left forehead  No LOC  No blood thinners  No neck pain

## 2022-09-27 ENCOUNTER — LAB (OUTPATIENT)
Dept: LAB | Facility: CLINIC | Age: 87
End: 2022-09-27
Payer: MEDICARE

## 2022-09-27 ENCOUNTER — HOSPITAL ENCOUNTER (EMERGENCY)
Facility: CLINIC | Age: 87
Discharge: HOME | End: 2022-09-27
Attending: EMERGENCY MEDICINE | Admitting: EMERGENCY MEDICINE
Payer: MEDICARE

## 2022-09-27 ENCOUNTER — OFFICE VISIT (OUTPATIENT)
Dept: INTERNAL MEDICINE | Facility: CLINIC | Age: 87
End: 2022-09-27
Payer: MEDICARE

## 2022-09-27 VITALS
OXYGEN SATURATION: 96 % | HEIGHT: 67 IN | DIASTOLIC BLOOD PRESSURE: 81 MMHG | HEART RATE: 73 BPM | TEMPERATURE: 96.7 F | RESPIRATION RATE: 16 BRPM | SYSTOLIC BLOOD PRESSURE: 186 MMHG | BODY MASS INDEX: 25.9 KG/M2 | WEIGHT: 165 LBS

## 2022-09-27 VITALS
HEART RATE: 72 BPM | HEIGHT: 67 IN | WEIGHT: 165.8 LBS | BODY MASS INDEX: 26.02 KG/M2 | OXYGEN SATURATION: 96 % | DIASTOLIC BLOOD PRESSURE: 72 MMHG | SYSTOLIC BLOOD PRESSURE: 154 MMHG

## 2022-09-27 DIAGNOSIS — R60.0 LEG EDEMA, LEFT: ICD-10-CM

## 2022-09-27 DIAGNOSIS — I82.412 ACUTE DEEP VEIN THROMBOSIS (DVT) OF FEMORAL VEIN OF LEFT LOWER EXTREMITY (H): ICD-10-CM

## 2022-09-27 DIAGNOSIS — R60.0 LEG EDEMA, LEFT: Primary | ICD-10-CM

## 2022-09-27 LAB — D DIMER PPP FEU-MCNC: 3 UG/ML FEU (ref 0–0.5)

## 2022-09-27 PROCEDURE — 85379 FIBRIN DEGRADATION QUANT: CPT | Performed by: NURSE PRACTITIONER

## 2022-09-27 PROCEDURE — 99284 EMERGENCY DEPT VISIT MOD MDM: CPT | Mod: 25 | Performed by: EMERGENCY MEDICINE

## 2022-09-27 PROCEDURE — 36415 COLL VENOUS BLD VENIPUNCTURE: CPT | Performed by: PATHOLOGY

## 2022-09-27 PROCEDURE — 99215 OFFICE O/P EST HI 40 MIN: CPT | Performed by: NURSE PRACTITIONER

## 2022-09-27 PROCEDURE — 250N000013 HC RX MED GY IP 250 OP 250 PS 637: Performed by: EMERGENCY MEDICINE

## 2022-09-27 PROCEDURE — 99284 EMERGENCY DEPT VISIT MOD MDM: CPT | Performed by: EMERGENCY MEDICINE

## 2022-09-27 RX ADMIN — RIVAROXABAN 15 MG: 15 TABLET, FILM COATED ORAL at 21:25

## 2022-09-27 ASSESSMENT — ENCOUNTER SYMPTOMS
CONFUSION: 0
DIFFICULTY URINATING: 0
COLOR CHANGE: 0
EYE REDNESS: 0
NECK STIFFNESS: 0
ABDOMINAL PAIN: 0
FEVER: 0
HEADACHES: 0
ARTHRALGIAS: 0
SHORTNESS OF BREATH: 0

## 2022-09-27 ASSESSMENT — ACTIVITIES OF DAILY LIVING (ADL): ADLS_ACUITY_SCORE: 33

## 2022-09-27 NOTE — PROGRESS NOTES
S: Lynnette Mata is a 87 year old female who reports 3 to 4 days of left lower leg swelling and slight warmth.  She does sit most of the day as ambulating is cumbersome for her.  She does not wear compression socks.  She has not had left leg swelling like this before.  She denies any pain in her calf.  She does not take any blood thinner.  She has not had any recent lower extremity trauma.         Patient Active Problem List   Diagnosis     Essential hypertension, benign     Post-menopause     Cervical stenosis (uterine cervix)     Knee pain, bilateral     Encounter for routine gynecological examination     Primary osteoarthritis of both knees     Genital atrophy of female     ACP (advance care planning)     Abnormal cardiovascular stress test     Neurogenic claudication (H)            Past Medical History:   Diagnosis Date     Asymptomatic menopausal state age 45    Hx HRT use x 6+ years--off with [facility] study findings     Shingles outbreak 2/14/2013    Despite immunization at age 72!        Stricture and stenosis of cervix      Unspecified constipation     hx chronic            Past Surgical History:   Procedure Laterality Date     COLONOSCOPY  4.15.09    (Fiberoptic) WNL -- ami in 10 yrs            Social History     Tobacco Use     Smoking status: Never Smoker     Smokeless tobacco: Never Used   Substance Use Topics     Alcohol use: Yes     Alcohol/week: 0.0 standard drinks     Comment: 1 d; <3x week / week MAX,             Family History   Problem Relation Age of Onset     Myocardial Infarction Mother 74     Cerebrovascular Disease Maternal Grandmother 74     Cancer - colorectal Maternal Aunt 68     Endocrine Disease Brother 10        Type I Diabetes Mellitus     Psychotic Disorder Brother         bipolar     C.A.D. Brother      C.A.D. Father      Psychotic Disorder Father 49        suicide     Diabetes Maternal Grandfather 65        DMII     Parkinsonism Other                      No Known  "Allergies         Current Outpatient Medications   Medication Sig Dispense Refill     Calcium Carbonate (CALCIUM 600 PO) Take  by mouth daily.       diclofenac (VOLTAREN) 1 % topical gel Apply 2 g topically 2 times daily as needed for moderate pain 30 g 11     diclofenac (VOLTAREN) 1 % topical gel Place 2 g onto the skin 4 times daily 1 Tube 1     escitalopram (LEXAPRO) 10 MG tablet Take 1 tablet (10 mg) by mouth daily 90 tablet 3     fish oil-omega-3 fatty acids 1000 MG capsule Take 2 g by mouth daily.       Glucosamine 500 MG CAPS Take  by mouth daily.       lisinopril (ZESTRIL) 10 MG tablet Take 1 tablet (10 mg) by mouth daily 90 tablet 3     Multiple Vitamin (MULTI-VITAMIN) per tablet Take 1 tablet by mouth daily.       simvastatin (ZOCOR) 20 MG tablet Take 1 tablet (20 mg) by mouth daily 90 tablet 3       REVIEW OF SYSTEMS:  See above.    O:   BP (!) 154/72 (BP Location: Right arm, Patient Position: Sitting, Cuff Size: Adult Regular)   Pulse 72   Ht 1.69 m (5' 6.54\")   Wt 75.2 kg (165 lb 12.8 oz)   SpO2 96%   BMI 26.33 kg/m    GENERAL APPEARANCE: healthy, alert and no distress  RESP: No distress.  CV: regular rates and rhythm, normal S1 S2, no S3 or S4 and no murmur, click or rub.  Her left lower leg measurement below the knee is 39 cm compared with the right lower leg 35.5 cm left ankle 22.5 cm right ankle 21.5 cm.  She has marked edema over her lateral malleolus but no tenderness with palpation.  She has 1+ pitting to the knee over the tibia bilaterally.  Her lower left leg feels slightly warmer than the right to palpation.  She states the venous stasis color changes to her left lower leg are not new.  Femoral pulses intact bilaterally  NEURO: Alert and oriented  MUSK: She ambulates with walking sticks and needs considerable assistance to mount the exam table and dismount.  SKIN: LLE warm to touch over anterior surface.  EXT: warm.  Edema: see cardiovascular    A/P:  Lynnette was seen today for " recheck.    Diagnoses and all orders for this visit:    Leg edema, left  -     US Lower Extremity Venous Duplex Left; Future  -     D dimer quantitative; Future  -     Compression Sleeve/Stocking Order for DME - ONLY FOR DME    She is instructed to keep her left lower extremity elevated as possible and to wear her compression hose.  She is to report any increased warmth, tenderness, redness to the clinic immediately.  She was advised if her Doppler ultrasound is positive she would be sent to the ER.    Result positive left femoral DVT.  Pt sent to ER.     The patient voiced understanding of the information discussed and all questions were answered.     I spent a total of 30 minutes in the care of this pt during today's office visit. This time includes reviewing the patient's chart and prior history, obtaining a history, performing an examination and evaluation and counseling the patient. This time also includes ordering medications or tests necessary in addition to communication to other member's of the patient's health care team. Time spent in documentation and care coordination is included.     Hali SMITH, CNP

## 2022-09-27 NOTE — NURSING NOTE
"Lynnette Mata is a 87 year old female patient that presents today in clinic for the following:    Chief Complaint   Patient presents with     RECHECK     L leg swelling     The patient's allergies and medications were reviewed as noted. A set of vitals were recorded as noted without incident: BP (!) 154/72 (BP Location: Right arm, Patient Position: Sitting, Cuff Size: Adult Regular)   Pulse 72   Ht 1.69 m (5' 6.54\")   Wt 75.2 kg (165 lb 12.8 oz)   SpO2 96%   BMI 26.33 kg/m  . The patient does not have any other questions for the provider.    Barbara Ness, EMT at 4:21 PM on 9/27/2022.  Primary care clinic: [phone]  "

## 2022-09-28 NOTE — CONSULTS
INTERVENTIONAL RADIOLOGY CONSULT NOTE    Reason for referral:   Extensive DVT of the left lower extremity    History:   Lynnette Mata is a 87 year old female who lives at assisted living who developed 3 to 4 days of left leg swelling and discomfort. She was seen in the clinic today, they ordered an ultrasound which was positive for femoral DVT and she was advised to come here to initiate treatment. IR consulted due to large DVT burden.    Labs:  Lab Results   Component Value Date    HGB 12.8 09/06/2022    HGB 13.7 10/06/2020     Lab Results   Component Value Date     09/06/2022     10/06/2020     Lab Results   Component Value Date    WBC 5.0 09/06/2022    WBC 4.8 10/06/2020       No results found for: INR    Lab Results   Component Value Date    PROTTOTAL 6.8 09/06/2022    PROTTOTAL 7.2 06/26/2019      Lab Results   Component Value Date    ALBUMIN 4.0 09/06/2022    ALBUMIN 3.6 06/26/2019     Lab Results   Component Value Date    BILITOTAL 0.2 09/06/2022    BILITOTAL 0.4 06/26/2019     No results found for: BILICONJ   Lab Results   Component Value Date    ALKPHOS 53 09/06/2022    ALKPHOS 37 06/26/2019     Lab Results   Component Value Date    AST 22 09/06/2022    AST 20 06/26/2019     Lab Results   Component Value Date    ALT 19 09/06/2022    ALT 32 06/26/2019       Lab Results   Component Value Date    CR 0.68 09/06/2022    CR 0.65 10/06/2020     Lab Results   Component Value Date    BUN 23.1 09/06/2022    BUN 27 11/29/2021    BUN 23 10/06/2020       Imaging:   DVT US 9/27/22            Assessment:   Lynnette Mata is a 88 y/o female patient presenting for LLE DVT. She has a relatively large burden of DVT within the left lower extremity which appears to spare the left common femoral vein.  Notably there are no waveform changes at the left common femoral vein indicating patency of the iliac system.  Per ER report pulses are patent and the patient denies PE symptoms.  Given the patients pain  level is amenable to outpatient therapy, patency of the most proximal leg vessels and intact arterial pulses no intervention is recommended at this time.  Intervention could be reconsidered if the patient develops phlegmasia dolens or severe pain.    Plan:   -No plan for intervention at this time with IR.  -Recommend routine outpatient DVT therapeutic anticoagulation.  - Safe to discharge to home per IR.    I, Dr Edwar Fields, discussed the case with the fellow and agree with the findings as documented in the assessment and plan.    Edwar Fields MD

## 2022-09-28 NOTE — ED PROVIDER NOTES
ED Provider Note  [facility] EMERGENCY DEPARTMENT ([facility])  9/27/22      History     Chief Complaint   Patient presents with     Leg Swelling     HPI  Lynnette Mata is a 87 year old female who lives at assisted living who developed 3 to 4 days of left leg swelling and discomfort. She was seen in the clinic today, they ordered an ultrasound which was positive for femoral DVT and she was advised to come here to initiate treatment.  She has no symptoms of pulmonary embolus, no symptoms of COVID, she has never had a DVT previously.  She uses assistance to ambulate at baseline, she does not feel that this is impairing her to significant degree.  I anticipate putting her on a DOAC and discharging her with follow-up in her primary care clinic with warning about head injuries.     This part of the medical record was transcribed by Bell Cabrera Medical Scribe, from a dictation done by Romeo Oneill MD.     ULTRASOUND LOWER EXTREMITY VENOUS DUPLEX LEFT 9/27/2022 6:15 PM  IMPRESSION:   1. Extensive left lower extremity deep venous thrombosis       A. Common femoral non occlusive deep venous thrombosis.       B. Duplicated femoral veins in the proximal and mid thigh. One  with occlusive and the other with non occlusive deep venous  thrombosis.       C. Single femoral vein in the distal thigh with occlusive deep  venous thrombosis.       D. Popliteal occlusive deep venous thrombosis.       E. Gastrocnemius occlusive deep venous thrombosis.       F. Posterior tibial occlusive deep venous thrombosis.    Past Medical History  Past Medical History:   Diagnosis Date     Asymptomatic menopausal state age 45    Hx HRT use x 6+ years--off with [facility] study findings     Shingles outbreak 2/14/2013    Despite immunization at age 72!        Stricture and stenosis of cervix      Unspecified constipation     hx chronic     Past Surgical History:   Procedure Laterality Date      COLONOSCOPY  4.15.09    (Fiberoptic) WNL -- ami in 10 yrs     Calcium Carbonate (CALCIUM 600 PO)  diclofenac (VOLTAREN) 1 % topical gel  diclofenac (VOLTAREN) 1 % topical gel  escitalopram (LEXAPRO) 10 MG tablet  fish oil-omega-3 fatty acids 1000 MG capsule  Glucosamine 500 MG CAPS  lisinopril (ZESTRIL) 10 MG tablet  Multiple Vitamin (MULTI-VITAMIN) per tablet  simvastatin (ZOCOR) 20 MG tablet      No Known Allergies  Family History  Family History   Problem Relation Age of Onset     Myocardial Infarction Mother 74     Cerebrovascular Disease Maternal Grandmother 74     Cancer - colorectal Maternal Aunt 68     Endocrine Disease Brother 10        Type I Diabetes Mellitus     Psychotic Disorder Brother         bipolar     C.A.D. Brother      C.A.D. Father      Psychotic Disorder Father 49        suicide     Diabetes Maternal Grandfather 65        DMII     Parkinsonism Other              Social History   Social History     Tobacco Use     Smoking status: Never Smoker     Smokeless tobacco: Never Used   Substance Use Topics     Alcohol use: Yes     Alcohol/week: 0.0 standard drinks     Comment: 1 d; <3x week / week MAX,      Drug use: No      Past medical history, past surgical history, medications, allergies, family history, and social history were reviewed with the patient. No additional pertinent items.       Review of Systems   Constitutional: Negative for fever.   HENT: Negative for congestion.    Eyes: Negative for redness.   Respiratory: Negative for shortness of breath.    Cardiovascular: Positive for leg swelling (left). Negative for chest pain.   Gastrointestinal: Negative for abdominal pain.   Genitourinary: Negative for difficulty urinating.   Musculoskeletal: Negative for arthralgias and neck stiffness.   Skin: Negative for color change.   Neurological: Negative for headaches.   Psychiatric/Behavioral: Negative for confusion.   All other systems reviewed and are negative.    A complete review of  "systems was performed with pertinent positives and negatives noted in the HPI, and all other systems negative.    Physical Exam   BP: (!) 176/76  Pulse: 73  Temp: (!) 96.7  F (35.9  C)  Resp: 16  Height: 168.9 cm (5' 6.5\")  Weight: 74.8 kg (165 lb)  SpO2: 96 %  Physical Exam  Vitals and nursing note reviewed.   Constitutional:       General: She is not in acute distress.  Cardiovascular:      Rate and Rhythm: Normal rate and regular rhythm.   Pulmonary:      Effort: Pulmonary effort is normal.      Breath sounds: Normal breath sounds.   Musculoskeletal:         General: Tenderness present.      Right lower leg: Edema present.      Left lower leg: Edema present.      Comments: Left calf swollen more than right, no discoloration, no cellulitis   Skin:     General: Skin is warm.      Findings: No rash.   Neurological:      General: No focal deficit present.      Mental Status: She is alert and oriented to person, place, and time.   Psychiatric:         Mood and Affect: Mood normal.         Behavior: Behavior normal.         ED Course      Procedures       Medications   rivaroxaban ANTICOAGULANT (XARELTO) tablet 15 mg (has no administration in time range)              Results for orders placed or performed in visit on 09/27/22    Lower Extremity Venous Duplex Left     Status: None    Narrative    ULTRASOUND LOWER EXTREMITY VENOUS DUPLEX LEFT 9/27/2022 6:15 PM    CLINICAL HISTORY: Left leg swelling and pain.     COMPARISONS: None available.    REFERRING PROVIDER: ROBBIN PATEL M    TECHNIQUE: Grayscale, color Doppler, Doppler waveform ultrasound  evaluation was performed through the left common femoral, femoral, and  popliteal veins. Left gastrocnemius,  posterior tibial, and peroneal  veins were evaluated with grayscale imaging and compression.    Right common femoral vein was evaluated for symmetry.    FINDINGS: Right common femoral vein is patent, fully compressible, and  demonstrates normal phasic Doppler " waveform.    Left common femoral vein is partially compressible with a phasic  waveform.    Femoral vein is duplicated in the proximal and mid thigh. In the  proximal thigh one of the veins is partially compressible with a  flattened waveform and the other is non compressible and occluded. In  the mid thigh one of the veins is partially compressible with a phasic  waveform and the other is non compressible and occluded.    Single femoral vein in the distal thigh is non compressible and  occluded.    Left popliteal vein is non compressible and occluded.    Left gastrocnemius veins are non compressible.    Left posterior tibial veins are non compressible to the ankle.    Left peroneal veins are fully compressible to the distal calf.    Hali Hall was called and notified of the results. She asked that  we have Ms. Mata go to the [facility] for  further evaluation and treatment.       Impression    IMPRESSION:   1. Extensive left lower extremity deep venous thrombosis       A. Common femoral non occlusive deep venous thrombosis.       B. Duplicated femoral veins in the proximal and mid thigh. One  with occlusive and the other with non occlusive deep venous  thrombosis.       C. Single femoral vein in the distal thigh with occlusive deep  venous thrombosis.       D. Popliteal occlusive deep venous thrombosis.       E. Gastrocnemius occlusive deep venous thrombosis.       F. Posterior tibial occlusive deep venous thrombosis.    AMBREEN SNELL MD         SYSTEM ID:  [id number]     Medications - No data to display     Assessments & Plan (with Medical Decision Making)   Patient with 3 to 4 days of lower extremity swelling clinic did an ultrasound and referred here for management of DVT.  I did run the case by interventional radiology they did not feel that this was an interventional case.  I will start her on Xarelto first dose was given here the remained in protocol was transferred electronically to  her pharmacy.  She should elevate the leg when possible if she has any trauma particularly head trauma or develops chest pain or shortness of breath she needs to return to the emergency department otherwise schedule a routine follow-up appointment with your clinic as they will be following you for resolution of the thrombus    I have reviewed the nursing notes. I have reviewed the findings, diagnosis, plan and need for follow up with the patient.    New Prescriptions    No medications on file       Final diagnoses:   Acute deep vein thrombosis (DVT) of femoral vein of left lower extremity (H)     I, Bell Cabrera, am serving as a trained medical scribe to document services personally performed by Romeo Oneill MD based on the provider's statements to me on September 27, 2022.  This document has been checked and approved by the attending provider.    I, Romeo Oneill MD, was physically present and have reviewed and verified the accuracy of this note documented by Bell Cabrera, medical scribe.      --    [facility] EMERGENCY DEPARTMENT  9/27/2022     Romeo Oneill MD  09/27/22 [id number]

## 2022-09-28 NOTE — DISCHARGE INSTRUCTIONS
Start anticoagulant medication as directed, the prescription was electronically transmitted to your [facility] pharmacy  Make a follow-up appointment with one of your primary care providers  Exercise caution to prevent falls, if you do fall and particularly strike your head you need to go to the Emergency Department  If you develop shortness of breath or chest pain you should be evaluated in the Emergency Department

## 2022-09-28 NOTE — ED TRIAGE NOTES
Pt  Sent here from PCP for further evaluation of blood clot in left leg. C/O left leg swelling and mild pain. No chest pain or dyspnea.    Triage Assessment     Row Name 09/27/22 1927       Triage Assessment (Adult)    Airway WDL WDL       Respiratory WDL    Respiratory WDL WDL       Skin Circulation/Temperature WDL    Skin Circulation/Temperature WDL WDL       Cardiac WDL    Cardiac WDL WDL       Peripheral/Neurovascular WDL    Peripheral Neurovascular WDL WDL       Cognitive/Neuro/Behavioral WDL    Cognitive/Neuro/Behavioral WDL WDL

## 2022-11-07 ENCOUNTER — IMMUNIZATION (OUTPATIENT)
Dept: FAMILY MEDICINE | Facility: CLINIC | Age: 87
End: 2022-11-07
Payer: MEDICARE

## 2022-11-07 DIAGNOSIS — Z23 NEED FOR VACCINATION: Primary | ICD-10-CM

## 2022-11-07 PROCEDURE — 91312 COVID-19,PF,PFIZER BOOSTER BIVALENT: CPT

## 2022-11-07 PROCEDURE — 0124A COVID-19,PF,PFIZER BOOSTER BIVALENT: CPT

## 2022-11-07 NOTE — PROGRESS NOTES
Prior to immunization administration, verified patients identity using patient s name and date of birth. Please see Immunization Activity for additional information.     Screening Questionnaire for Adult Immunization    Are you sick today?   No   Do you have allergies to medications, food, a vaccine component or latex?   No   Have you ever had a serious reaction after receiving a vaccination?   No   Do you have a long-term health problem with heart, lung, kidney, or metabolic disease (e.g., diabetes), asthma, a blood disorder, no spleen, complement component deficiency, a cochlear implant, or a spinal fluid leak?  Are you on long-term aspirin therapy?   No   Do you have cancer, leukemia, HIV/AIDS, or any other immune system problem?   No   Do you have a parent, brother, or sister with an immune system problem?   No   In the past 3 months, have you taken medications that affect  your immune system, such as prednisone, other steroids, or anticancer drugs; drugs for the treatment of rheumatoid arthritis, Crohn s disease, or psoriasis; or have you had radiation treatments?   No   Have you had a seizure, or a brain or other nervous system problem?   No   During the past year, have you received a transfusion of blood or blood    products, or been given immune (gamma) globulin or antiviral drug?   No   For women: Are you pregnant or is there a chance you could become       pregnant during the next month?   No   Have you received any vaccinations in the past 4 weeks?   No     Immunization questionnaire answers were all negative.        Per orders of Dr. Kerns, injection of Bivalent Pfizer given by Sharron Rachel CMA. Patient instructed to remain in clinic for 15 minutes afterwards, and to report any adverse reaction to me immediately.       Screening performed by Sharron Rachel CMA on 11/7/2022 at 9:30 AM.

## 2022-11-19 ENCOUNTER — HEALTH MAINTENANCE LETTER (OUTPATIENT)
Age: 87
End: 2022-11-19

## 2022-12-12 ENCOUNTER — LAB (OUTPATIENT)
Dept: LAB | Facility: CLINIC | Age: 87
End: 2022-12-12
Payer: MEDICARE

## 2022-12-12 ENCOUNTER — OFFICE VISIT (OUTPATIENT)
Dept: INTERNAL MEDICINE | Facility: CLINIC | Age: 87
End: 2022-12-12
Payer: MEDICARE

## 2022-12-12 VITALS
BODY MASS INDEX: 26.84 KG/M2 | SYSTOLIC BLOOD PRESSURE: 131 MMHG | OXYGEN SATURATION: 95 % | HEART RATE: 83 BPM | HEIGHT: 66 IN | DIASTOLIC BLOOD PRESSURE: 64 MMHG | WEIGHT: 167 LBS

## 2022-12-12 DIAGNOSIS — F32.1 MODERATE MAJOR DEPRESSION (H): ICD-10-CM

## 2022-12-12 DIAGNOSIS — I10 ESSENTIAL HYPERTENSION, BENIGN: ICD-10-CM

## 2022-12-12 DIAGNOSIS — R19.09 OTHER INTRA-ABDOMINAL AND PELVIC SWELLING, MASS AND LUMP: ICD-10-CM

## 2022-12-12 DIAGNOSIS — R07.89 ATYPICAL CHEST PAIN: ICD-10-CM

## 2022-12-12 DIAGNOSIS — Z91.81 PERSONAL HISTORY OF FALL: ICD-10-CM

## 2022-12-12 DIAGNOSIS — R19.5 LOOSE STOOLS: ICD-10-CM

## 2022-12-12 DIAGNOSIS — R26.89 BALANCE PROBLEMS: ICD-10-CM

## 2022-12-12 DIAGNOSIS — I82.402 DEEP VEIN THROMBOSIS (DVT) OF LEFT LOWER EXTREMITY, UNSPECIFIED CHRONICITY, UNSPECIFIED VEIN (H): Primary | ICD-10-CM

## 2022-12-12 DIAGNOSIS — E78.01 FAMILIAL HYPERCHOLESTEROLEMIA: ICD-10-CM

## 2022-12-12 DIAGNOSIS — M62.81 GENERALIZED MUSCLE WEAKNESS: ICD-10-CM

## 2022-12-12 DIAGNOSIS — R14.0 ABDOMINAL BLOATING: ICD-10-CM

## 2022-12-12 DIAGNOSIS — R63.5 WEIGHT GAIN: ICD-10-CM

## 2022-12-12 DIAGNOSIS — R14.0 ABDOMINAL BLOATING: Primary | ICD-10-CM

## 2022-12-12 LAB — CANCER AG125 SERPL-ACNC: 15 U/ML

## 2022-12-12 PROCEDURE — 86304 IMMUNOASSAY TUMOR CA 125: CPT | Performed by: INTERNAL MEDICINE

## 2022-12-12 PROCEDURE — 36415 COLL VENOUS BLD VENIPUNCTURE: CPT | Performed by: PATHOLOGY

## 2022-12-12 PROCEDURE — 99215 OFFICE O/P EST HI 40 MIN: CPT | Performed by: INTERNAL MEDICINE

## 2022-12-12 RX ORDER — LISINOPRIL 10 MG/1
10 TABLET ORAL DAILY
Qty: 90 TABLET | Refills: 3 | Status: SHIPPED | OUTPATIENT
Start: 2022-12-12 | End: 2023-12-18

## 2022-12-12 RX ORDER — LISINOPRIL 10 MG/1
10 TABLET ORAL DAILY
Qty: 90 TABLET | Refills: 3 | Status: CANCELLED | OUTPATIENT
Start: 2022-12-12

## 2022-12-12 RX ORDER — SIMVASTATIN 20 MG
20 TABLET ORAL DAILY
Qty: 90 TABLET | Refills: 3 | Status: SHIPPED | OUTPATIENT
Start: 2022-12-12 | End: 2024-02-12

## 2022-12-12 RX ORDER — ESCITALOPRAM OXALATE 10 MG/1
10 TABLET ORAL DAILY
Qty: 90 TABLET | Refills: 3 | Status: CANCELLED | OUTPATIENT
Start: 2022-12-12

## 2022-12-12 RX ORDER — ESCITALOPRAM OXALATE 10 MG/1
10 TABLET ORAL DAILY
Qty: 90 TABLET | Refills: 3 | Status: SHIPPED | OUTPATIENT
Start: 2022-12-12 | End: 2024-01-09

## 2022-12-12 ASSESSMENT — PATIENT HEALTH QUESTIONNAIRE - PHQ9: SUM OF ALL RESPONSES TO PHQ QUESTIONS 1-9: 0

## 2022-12-12 NOTE — NURSING NOTE
"Lynnette Mata is a 87 year old female patient that presents today in clinic for the following:    Chief Complaint   Patient presents with     Follow Up     Pt here for follow up on blood clot concerns for the past two months.     The patient's allergies and medications were reviewed as noted. A set of vitals were recorded as noted without incident: /64 (BP Location: Right arm, Patient Position: Sitting, Cuff Size: Adult Regular)   Pulse 83   Ht 1.689 m (5' 6.5\")   Wt 75.8 kg (167 lb)   SpO2 95%   BMI 26.55 kg/m  . The patient does not have any other questions for the provider.    Steff Swanson, EMT at 1:53 PM on 12/12/2022  "

## 2022-12-12 NOTE — PROGRESS NOTES
"S) Ms. Mata is an 86 yo woman with HTN, hyperlipidemia, depression and recent diagnosis of extensive LLE DVT seen for f/u and ongoing care. On history she relates DVT dx 9/27/22. I've reviewed the ultrasound and note extensive clot. No predisposing injury, known malignancy, prolonged travel or immobilization--> ie, an unprovoked clot. She states she \"took the medication until it ran out after a month.\" Still with left LE swelling. Off the med since end of October. No chest pain or shortness of breath. ROS remarkable for abdominal bloating, looser stools, and 7 lb weight gain.     Past Medical History:   Diagnosis Date     Asymptomatic menopausal state age 45    Hx HRT use x 6+ years--off with [facility] study findings     Shingles outbreak 2/14/2013    Despite immunization at age 72!        Stricture and stenosis of cervix      Unspecified constipation     hx chronic     Current Outpatient Medications   Medication     escitalopram (LEXAPRO) 10 MG tablet     lisinopril (ZESTRIL) 10 MG tablet     [START ON 1/6/2023] rivaroxaban ANTICOAGULANT (XARELTO) 20 MG TABS tablet     Rivaroxaban ANTICOAGULANT 15 & 20 MG TBPK Starter Therapy Pack     simvastatin (ZOCOR) 20 MG tablet     Calcium Carbonate (CALCIUM 600 PO)     diclofenac (VOLTAREN) 1 % topical gel     diclofenac (VOLTAREN) 1 % topical gel     fish oil-omega-3 fatty acids 1000 MG capsule     Glucosamine 500 MG CAPS     Multiple Vitamin (MULTI-VITAMIN) per tablet     No current facility-administered medications for this visit.     O) /64 (BP Location: Right arm, Patient Position: Sitting, Cuff Size: Adult Regular)   Pulse 83   Ht 1.689 m (5' 6.5\")   Wt 75.8 kg (167 lb)   SpO2 95%   BMI 26.55 kg/m    Well appearing  Nl resp effort and mentation  Cor RRR no MRG  Lungs clear  Abd soft with some bloating and fullness, no ascites appreciated  LE exam with left > right swelling    A/P) 1) Extensive LLE DVT with inadequate treatment course. Concerned about risk of " clot extension, PE. Restart rivaroxaban today (starter pack ordered) with clear instructions on change to 20 mg daily after starter pack course complete. All ordered. Concerned about potential etiologies of the clot. With bloating, ovarian malignancy a concern. Will check basic labs, and added a CA-125 and if elevated will add CT abd/pelvis. Age appropriate screening UTD. U/S ordered to f/u DVT and post-phlebitic syndrome concerns.   2) Looser stools. Unclear etiology. No obvious dietary causes. Meds/supplements could contribute. Check fecal calprotection, elastase, lactoferrin. If all normal will follow clinically. If abnl may need lower GI w/u.     40 minutes spent on the date of the encounter performing chart review, history and exam, documentation and further activities as noted above.    [place] MD  Primary Care Center  [facility]

## 2022-12-14 LAB — LACTOFERRIN STL QL IA: POSITIVE

## 2022-12-14 PROCEDURE — 82653 EL-1 FECAL QUANTITATIVE: CPT | Performed by: INTERNAL MEDICINE

## 2022-12-14 PROCEDURE — 83630 LACTOFERRIN FECAL (QUAL): CPT | Performed by: INTERNAL MEDICINE

## 2022-12-15 DIAGNOSIS — R19.7 DIARRHEA, UNSPECIFIED TYPE: Primary | ICD-10-CM

## 2022-12-15 DIAGNOSIS — K52.9 INFLAMMATION OF COLONIC MUCOSA: ICD-10-CM

## 2022-12-16 ENCOUNTER — ANCILLARY PROCEDURE (OUTPATIENT)
Dept: ULTRASOUND IMAGING | Facility: CLINIC | Age: 87
End: 2022-12-16
Attending: INTERNAL MEDICINE
Payer: MEDICARE

## 2022-12-16 ENCOUNTER — TELEPHONE (OUTPATIENT)
Dept: INTERNAL MEDICINE | Facility: CLINIC | Age: 87
End: 2022-12-16

## 2022-12-16 DIAGNOSIS — I82.402 DEEP VEIN THROMBOSIS (DVT) OF LEFT LOWER EXTREMITY, UNSPECIFIED CHRONICITY, UNSPECIFIED VEIN (H): ICD-10-CM

## 2022-12-16 PROCEDURE — 93971 EXTREMITY STUDY: CPT | Mod: LT | Performed by: RADIOLOGY

## 2022-12-16 NOTE — TELEPHONE ENCOUNTER
Called patient and lvm to schedule labs and procedure with call back numbers.     Betito Lombardo RN on 12/16/2022 at 3:42 PM

## 2022-12-17 PROCEDURE — 87506 IADNA-DNA/RNA PROBE TQ 6-11: CPT | Performed by: INTERNAL MEDICINE

## 2022-12-17 PROCEDURE — 83993 ASSAY FOR CALPROTECTIN FECAL: CPT | Performed by: PATHOLOGY

## 2022-12-19 ENCOUNTER — TELEPHONE (OUTPATIENT)
Dept: GASTROENTEROLOGY | Facility: CLINIC | Age: 87
End: 2022-12-19

## 2022-12-19 ENCOUNTER — TELEPHONE (OUTPATIENT)
Dept: INTERNAL MEDICINE | Facility: CLINIC | Age: 87
End: 2022-12-19

## 2022-12-19 DIAGNOSIS — R19.7 DIARRHEA: Primary | ICD-10-CM

## 2022-12-19 LAB
CALPROTECTIN STL-MCNT: 76.7 MG/KG (ref 0–49.9)
ELASTASE PANC STL-MCNT: 357 UG/G

## 2022-12-19 RX ORDER — BISACODYL 5 MG
TABLET, DELAYED RELEASE (ENTERIC COATED) ORAL
Qty: 4 TABLET | Refills: 0 | Status: SHIPPED | OUTPATIENT
Start: 2022-12-19 | End: 2024-02-12

## 2022-12-19 NOTE — TELEPHONE ENCOUNTER
Pre assessment questions completed for upcoming colonoscopy  procedure scheduled on 1/3/23    COVID policy reviewed.     Pre-op scheduled  N/A    Reviewed procedural arrival time 1200 and facility location [facility]; [address], [address], [place] [address]    Designated  policy reviewed. Instructed to have someone stay 24 hours post procedure.     Anticoagulation/blood thinners? Yes Rivaroxaban (Xarelto). Holding interval of 2 days    Electronic implanted devices? No    Diabetic? No    Procedure indication: Diarrhea    Bowel prep recommendation: Standard Golytely     Reviewed procedure prep instructions.     Prep instructions sent via [employer].  Bowel prep script sent to     [id number] - [place], [place] - [address]     Patient verbalized understanding and had no questions or concerns at this time.    ANTONIA GOSS RN

## 2022-12-19 NOTE — TELEPHONE ENCOUNTER
[facility] Call Center    Phone Message    May a detailed message be left on voicemail: yes     Reason for Call: Medication Question or concern regarding medication   Prescription Clarification  Name of Medication: rivaroxaban ANTICOAGULANT (XARELTO) 20 MG TABS tablet  Prescribing Provider: Nickolas Davenport MD   Pharmacy: [id number] [place], [place] - [address]   What on the order needs clarification? The patient said she started this medication 5 days ago which was 12/14/22 the order says to start 1/6/23. However the patient didn t do that and she wanted to know if she should stop taking it now please review and follow up thank you.          Action Taken: Message routed to:  [facility] pcc    Travel Screening: Not Applicable

## 2022-12-19 NOTE — TELEPHONE ENCOUNTER
Screening Questions  BLUE  KIND OF PREP RED  LOCATION [review exclusion criteria] GREEN  SEDATION TYPE    Y Are you active on mychart?   Nickolas Davenport MD  Ordering/Referring Provider?    MEDICARE/ HP  What type of coverage do you have?  N Have you had a positive covid test in the last 14 days?    28.7  1. BMI  [BMI 40+ - review exclusion criteria]            *NEED PAC APPT AT [facility]*     SELF   2. Are you able to give consent for your medical care? [IF NO,RN REVIEW]        N  3. Are you taking any prescription pain medications on a routine schedule?        N 3a. EXTENDED PREP What kind of prescription?     N 4. Do you have any chemical dependencies such as alcohol, street drugs, or methadone?    N 5. Do you have any history of post-traumatic stress syndrome, severe anxiety or history of psychosis?      **If yes 3- 5 , please schedule with MAC sedation.**          IF YES TO ANY 6 - 10 - HOSPITAL SETTING ONLY.     N 6.   Do you need assistance transferring?     N 7.   Have you had a heart or lung transplant?    N 8.   Are you currently on dialysis?   N 9.   Do you use daily home oxygen?   N 10. Do you take nitroglycerin?   10a. N If yes, how often?     11. [FEMALES]   Are you currently pregnant?     11a.  If yes, how many weeks? [ Greater than 12 weeks, OR NEEDED]    N 12. [review exclusion criteria]  Do you have any implantable devices in your body (pacemaker, defib, LVAD)?            *NEED PAC APPT AT [facility]*     N 13. Do you have Pulmonary Hypertension?             *NEED PAC APPT AT [facility]*     N 14. In the past 6 months, have you had any heart related issues including cardiomyopathy or heart attack?     N 14a. If yes, did it require cardiac stenting if so when?     N 15. Have you had a stroke or Transient ischemic attack (TIA - aka  mini stroke ) within 6 months?      NN 16. Do you have mod to severe Obstructive Sleep Apnea?  [Hospital only - Ok at [facility]]    N 17. Do you have SEVERE AND UNCONTROLLED  "asthma?              *NEED PAC APPT AT [facility]*     Y - BLOOD THINNER  18. Are you currently taking any blood thinners?     18a. If yes, inform patient to \"follow up w/ ordering provider for bridging instructions.\"    N 19. Do you take the medication Phentermine?    19a. If yes, \"Hold for 7 days before procedure.  Please consult your prescribing provider if you have questions about holding this medication.\"     N  20. Do you have chronic kidney disease?      N  21. Do you have a diagnosis of diabetes?     N  22. On a regular basis do you go 3-5 days between bowel movements?     23. Preferred LOCAL Pharmacy for Pre Prescription    [ LIST ONLY ONE PHARMACY]     [id number] - [place], [place] - [address]        - CLOSING REMINDERS -    Informed patient they will need an adult    Cannot take any type of public or medical transportation alone    Conscious Sedation- Needs  for 6 hours after the procedure       MAC/General-Needs  for 24 hours after procedure    Pre-Procedure Covid test to be completed [[facility] PCR Testing Required]    Confirmed Nurse will call to complete assessment       - SCHEDULING DETAILS -    Additional comments:  RODNEY THOMPSON   Surgeon   01/03/2023  Date of Procedure  MAC  Sedation Type     N PAC / Pre-op Required   Location  [facility]        Type of Procedure Scheduled  Lower Endoscopy [Colonoscopy]      Which Colonoscopy Prep was Sent?     STANDARD GOLYTELY-If you answer yes to questions #8, #20, #21          "

## 2022-12-19 NOTE — TELEPHONE ENCOUNTER
Called patient, she is taking starter pack and then will go on the 20 mg. She is aware of the plan. She will hold xarelto prior to the procedure up to the team's discretion.     Betito Lombardo RN on 12/19/2022 at 2:09 PM

## 2023-01-03 ENCOUNTER — ANESTHESIA EVENT (OUTPATIENT)
Dept: SURGERY | Facility: AMBULATORY SURGERY CENTER | Age: 88
End: 2023-01-03
Payer: MEDICARE

## 2023-01-03 ENCOUNTER — HOSPITAL ENCOUNTER (OUTPATIENT)
Facility: AMBULATORY SURGERY CENTER | Age: 88
Discharge: HOME | End: 2023-01-03
Attending: INTERNAL MEDICINE
Payer: MEDICARE

## 2023-01-03 ENCOUNTER — ANESTHESIA (OUTPATIENT)
Dept: SURGERY | Facility: AMBULATORY SURGERY CENTER | Age: 88
End: 2023-01-03
Payer: MEDICARE

## 2023-01-03 VITALS
OXYGEN SATURATION: 95 % | BODY MASS INDEX: 28.32 KG/M2 | HEIGHT: 65 IN | HEART RATE: 82 BPM | DIASTOLIC BLOOD PRESSURE: 69 MMHG | RESPIRATION RATE: 18 BRPM | TEMPERATURE: 96.6 F | SYSTOLIC BLOOD PRESSURE: 143 MMHG | WEIGHT: 170 LBS

## 2023-01-03 RX ORDER — FLUMAZENIL 0.1 MG/ML
0.2 INJECTION, SOLUTION INTRAVENOUS
Status: CANCELLED | OUTPATIENT
Start: 2023-01-03 | End: 2023-01-03

## 2023-01-03 RX ORDER — NALOXONE HYDROCHLORIDE 0.4 MG/ML
0.2 INJECTION, SOLUTION INTRAMUSCULAR; INTRAVENOUS; SUBCUTANEOUS
Status: CANCELLED | OUTPATIENT
Start: 2023-01-03

## 2023-01-03 RX ORDER — PROCHLORPERAZINE MALEATE 5 MG
5 TABLET ORAL EVERY 6 HOURS PRN
Status: CANCELLED | OUTPATIENT
Start: 2023-01-03

## 2023-01-03 RX ORDER — ONDANSETRON 4 MG/1
4 TABLET, ORALLY DISINTEGRATING ORAL EVERY 6 HOURS PRN
Status: CANCELLED | OUTPATIENT
Start: 2023-01-03

## 2023-01-03 RX ORDER — ONDANSETRON 2 MG/ML
4 INJECTION INTRAMUSCULAR; INTRAVENOUS
Status: DISCONTINUED | OUTPATIENT
Start: 2023-01-03 | End: 2023-01-04 | Stop reason: HOSPADM

## 2023-01-03 RX ORDER — LIDOCAINE 40 MG/G
CREAM TOPICAL
Status: DISCONTINUED | OUTPATIENT
Start: 2023-01-03 | End: 2023-01-04 | Stop reason: HOSPADM

## 2023-01-03 RX ORDER — ONDANSETRON 2 MG/ML
4 INJECTION INTRAMUSCULAR; INTRAVENOUS EVERY 6 HOURS PRN
Status: CANCELLED | OUTPATIENT
Start: 2023-01-03

## 2023-01-03 RX ORDER — NALOXONE HYDROCHLORIDE 0.4 MG/ML
0.4 INJECTION, SOLUTION INTRAMUSCULAR; INTRAVENOUS; SUBCUTANEOUS
Status: CANCELLED | OUTPATIENT
Start: 2023-01-03

## 2023-01-03 NOTE — PROGRESS NOTES
"   Patient planned for colonoscopy today for reported diarrhea.      On history today, patient describes baseline stool pattern as two BMs a day, one in the morning, one later in the day. Stool is soft and formed without urgency. Around Nov 2022, her stool became less formed, still having two BMs daily. Consistency ranged from pile of \"mud\" to softer chunks ([place] 4-5 range). Did not have urgency or incontinence, but stool did seem to come out forcefully with a lot of flatus/gas. No nocturnal BMs or blood. Did have some abdominal bloating.    Work-up through primary care clinic:  very mild elevation of fecal calprotectin (76.7) at time of looser stools. Fecal elastase was normal.  normal.     Colonoscopy ordered for further evaluation. Noted patient is on statin and escitalopram. Last colonoscopy for screening around 2009, reportedly normal (aged out of continued screening).       Today, patient reports looser stools have resolved. She had meant to inform her primary care clinic, but had not had a chance to yet.     Recent med history also notable for an extensive left LE DVT (diagnosed 9/27/22 - please see US report from that date) . Patient was treated with anticoagulation through ED (script ran out), then restarted anticoagulation through primary care clinic in Dec (on rivaroxaban). Clot present, but improving, on 12/16/2022 LE US.     Given resolution of symptoms which initially prompted colonoscopy (less formed stools)  - and in setting of current known risk factors (including age and active medical issues)  in discussion with patient and Dr. Davenport (primary care, ordering physician) will cancel today's planned procedure.     GI remains available should clinical picture change and colonoscopy be necessitated.            "

## 2023-01-09 ENCOUNTER — OFFICE VISIT (OUTPATIENT)
Dept: INTERNAL MEDICINE | Facility: CLINIC | Age: 88
End: 2023-01-09
Payer: MEDICARE

## 2023-01-09 VITALS
BODY MASS INDEX: 27.82 KG/M2 | HEIGHT: 65 IN | SYSTOLIC BLOOD PRESSURE: 145 MMHG | OXYGEN SATURATION: 96 % | DIASTOLIC BLOOD PRESSURE: 68 MMHG | HEART RATE: 63 BPM | WEIGHT: 167 LBS

## 2023-01-09 DIAGNOSIS — R19.7 DIARRHEA, UNSPECIFIED TYPE: ICD-10-CM

## 2023-01-09 DIAGNOSIS — I82.402 DEEP VEIN THROMBOSIS (DVT) OF LEFT LOWER EXTREMITY, UNSPECIFIED CHRONICITY, UNSPECIFIED VEIN (H): Primary | ICD-10-CM

## 2023-01-09 PROCEDURE — 99214 OFFICE O/P EST MOD 30 MIN: CPT | Performed by: INTERNAL MEDICINE

## 2023-01-09 NOTE — PROGRESS NOTES
"S) Ms. Mata is an 88 yo woman seen for f/u of \"blow-out\" loose stool episode and re-initiation of anticoagulation for extensive LE DVT. She reports the loose stools have resolved. Fecal studies were concerning for an inflammatory/infectious process with fecal calprotectin and lactoferrin above normal limits. Not in the classic IBD flare range. Had scheduled colonoscopy but by the time it was scheduled, her symptoms resolved. We had also restarted rivaroxaban and re-assessed clot burden and the ultrasound was reassuring. Clinical exam still shows asymmetric left lower extremity swelling but only non-occlusive thrombus seen compared to previous exam in 9/2022. Tolerating meds without problems. No falls.     O) BP (!) 145/68 (BP Location: Right arm, Patient Position: Sitting, Cuff Size: Adult Regular)   Pulse 63   Ht 1.651 m (5' 5\")   Wt 75.8 kg (167 lb)   SpO2 96%   BMI 27.79 kg/m    Well appearing  Cor RRR no MRG  Lungs clear  Nl mentation  LLE >>RLE girth on exam. Trace LE edema.  No rashes    A/P) 1) Resolved diarrhea with inflammatory markers positive. Given the improvement without any blood/mucus to strongly suggest IBD, will hold on colonoscopy and follow clinically.   2) DVT. She relates a fall where she had a lump on her head preceding the clot but didn't spend time immobilized in the hospital and doesn't remember any LE injury. Must consider this unprovoked. Routine age appropriate cancer screening UTD. Follow clinically and anticoagulate indefinitely with re-evaluation in 6 mos.     30 minutes spent on the date of the encounter performing chart review, history and exam, documentation and further activities as noted above.    [place] Davenport MD  Primary Care Center  [facility]        "

## 2023-01-09 NOTE — NURSING NOTE
"Lynnette aMta is a 87 year old female patient that presents today in clinic for the following:    Chief Complaint   Patient presents with     Follow Up     Pt here for follow up and would like to discuss imaging and lab results.     The patient's allergies and medications were reviewed as noted. A set of vitals were recorded as noted without incident: BP (!) 145/68 (BP Location: Right arm, Patient Position: Sitting, Cuff Size: Adult Regular)   Pulse 63   Ht 1.651 m (5' 5\")   Wt 75.8 kg (167 lb)   SpO2 96%   BMI 27.79 kg/m  . The patient does not have any other questions for the provider.    Steff Swanson, EMT at 10:33 AM on 1/9/2023  "

## 2023-01-13 ENCOUNTER — OFFICE VISIT (OUTPATIENT)
Dept: ORTHOPEDICS | Facility: CLINIC | Age: 88
End: 2023-01-13
Payer: MEDICARE

## 2023-01-13 DIAGNOSIS — B35.1 OM (ONYCHOMYCOSIS): Primary | ICD-10-CM

## 2023-01-13 PROCEDURE — 99213 OFFICE O/P EST LOW 20 MIN: CPT | Performed by: PODIATRIST

## 2023-01-13 NOTE — PROGRESS NOTES
Chief Complaint:   Chief Complaint   Patient presents with     Follow Up     Onychomycosis.         HPI: Lynnette is a 87 year old female who presents today for further evaluation of BL toe onychomycosis. This is on her big toes. The right hallux nail was cutting into the 2nd digit. These are sometimes painful.     Review of Systems: No n/v/d/f/c/ns/sob/cp  PMH:   Past Medical History:   Diagnosis Date     Asymptomatic menopausal state age 45    Hx HRT use x 6+ years--off with  study findings     Shingles outbreak 2/14/2013    Despite immunization at age 72!        Stricture and stenosis of cervix      Unspecified constipation     hx chronic       PSxH:   Past Surgical History:   Procedure Laterality Date     COLONOSCOPY  4.15.09    (Fiberoptic) WNL -- ami in 10 yrs       Allergies: Patient has no known allergies.    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   Tobacco Use     Smoking status: Never     Smokeless tobacco: Never   Substance and Sexual Activity     Alcohol use: Yes     Alcohol/week: 0.0 standard drinks     Comment: 1 d; <3x week / week MAX,      Drug use: No     Sexual activity: Never     Partners: Male     Birth control/protection: Post-menopausal     Comment:  age 45   Other Topics Concern      Service No     Blood Transfusions No     Caffeine Concern No     Occupational Exposure No     Hobby Hazards No     Sleep Concern No     Stress Concern No     Weight Concern No     Special Diet No     Back Care No     Exercise No     Comment: Walks 3x weeks; 30 minutes     Bike Helmet No     Seat Belt No     Self-Exams Not Asked   Social History Narrative    How much exercise per week? Half hour everyday    How much  calcium per day? Supplement       How much caffeine per day? 1 cup daily    How much vitamin D per day? MVI    Do you/your family wear seatbelts?  Yes    Do you/your family use safety helmets? No    Do you/your family use sunscreen? No    Do you/your family keep firearms in the home? No    Do you/your family have a smoke detector(s)? Yes        Do you feel safe in your home? Yes    Has anyone ever touched you in an unwanted manner? No     Explain     SEE ARYAN URIOSTEGUI    2/3/2014        Reviewed [facility]05/01/2017    Reviewed [facility] 8/2/2018         Social Determinants of Health     Financial Resource Strain: Not on file   Food Insecurity: Not on file   Transportation Needs: Not on file   Physical Activity: Not on file   Stress: Not on file   Social Connections: Not on file   Intimate Partner Violence: Not on file   Housing Stability: Not on file       FH:   Family History   Problem Relation Age of Onset     Myocardial Infarction Mother 74     Cerebrovascular Disease Maternal Grandmother 74     Cancer - colorectal Maternal Aunt 68     Endocrine Disease Brother 10        Type I Diabetes Mellitus     Psychotic Disorder Brother         bipolar     C.A.D. Brother      C.A.D. Father      Psychotic Disorder Father 49        suicide     Diabetes Maternal Grandfather 65        DMII     Parkinsonism Other                Objective:  Data Unavailable Data Unavailable Data Unavailable Data Unavailable Data Unavailable 0 lbs 0 oz    PT and DP pulses are 1/4 bilaterally. CRT is 1 second. Positive pedal hair.   Gross sensation is intact bilaterally.   Equinus is noted bilaterally. No pain with active or passive ROM of the ankle, MTJ, 1st ray, or halluces bilaterally,.   Nails of bilateral halluces are thickened and mycotic. No open lesions are noted.     Assessment: BL hallux onychomycosis.     Plan:  - Pt seen and evaluated.  - Not a good candidate for PO Lamisil 2/2 age. Topicals are largely ineffective at curing  mycosis. Recommend that she keep the nails trimmed back.   - Nails debrided x 2.   - See again PRN.

## 2023-01-13 NOTE — LETTER
1/13/2023         RE: Lynnette Mata  [address]  [facility] [place] [address]        Dear Colleague,    Thank you for referring your patient, Lynnette Mata, to the [facility] [place]. Please see a copy of my visit note below.    Chief Complaint:   Chief Complaint   Patient presents with     Follow Up     Onychomycosis.         HPI: Lynnette is a 87 year old female who presents today for further evaluation of BL toe onychomycosis. This is on her big toes. The right hallux nail was cutting into the 2nd digit. These are sometimes painful.     Review of Systems: No n/v/d/f/c/ns/sob/cp  PMH:   Past Medical History:   Diagnosis Date     Asymptomatic menopausal state age 45    Hx HRT use x 6+ years--off with  study findings     Shingles outbreak 2/14/2013    Despite immunization at age 72!        Stricture and stenosis of cervix      Unspecified constipation     hx chronic       PSxH:   Past Surgical History:   Procedure Laterality Date     COLONOSCOPY  4.15.09    (Fiberoptic) WNL -- ami in 10 yrs       Allergies: Patient has no known allergies.    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[employer]- , taaught childrens literature   Tobacco Use     Smoking status: Never     Smokeless tobacco: Never   Substance and Sexual Activity     Alcohol use: Yes     Alcohol/week: 0.0 standard drinks     Comment: 1 d; <3x week / week MAX,      Drug use: No     Sexual activity: Never     Partners: Male     Birth control/protection: Post-menopausal     Comment:  age 45   Other Topics Concern      Service No     Blood Transfusions No     Caffeine Concern No     Occupational Exposure No     Hobby Hazards No     Sleep Concern No      Stress Concern No     Weight Concern No     Special Diet No     Back Care No     Exercise No     Comment: Walks 3x weeks; 30 minutes     Bike Helmet No     Seat Belt No     Self-Exams Not Asked   Social History Narrative    How much exercise per week? Half hour everyday    How much calcium per day? Supplement       How much caffeine per day? 1 cup daily    How much vitamin D per day? MVI    Do you/your family wear seatbelts?  Yes    Do you/your family use safety helmets? No    Do you/your family use sunscreen? No    Do you/your family keep firearms in the home? No    Do you/your family have a smoke detector(s)? Yes        Do you feel safe in your home? Yes    Has anyone ever touched you in an unwanted manner? No     Explain     SEE ARYAN URIOSTEGUI    2/3/2014        Reviewed [facility]05/01/2017    Reviewed John K[facility] 8/2/2018         Social Determinants of Health     Financial Resource Strain: Not on file   Food Insecurity: Not on file   Transportation Needs: Not on file   Physical Activity: Not on file   Stress: Not on file   Social Connections: Not on file   Intimate Partner Violence: Not on file   Housing Stability: Not on file       FH:   Family History   Problem Relation Age of Onset     Myocardial Infarction Mother 74     Cerebrovascular Disease Maternal Grandmother 74     Cancer - colorectal Maternal Aunt 68     Endocrine Disease Brother 10        Type I Diabetes Mellitus     Psychotic Disorder Brother         bipolar     C.A.D. Brother      C.A.D. Father      Psychotic Disorder Father 49        suicide     Diabetes Maternal Grandfather 65        DMII     Parkinsonism Other                Objective:  Data Unavailable Data Unavailable Data Unavailable Data Unavailable Data Unavailable 0 lbs 0 oz    PT and DP pulses are 1/4 bilaterally. CRT is 1 second. Positive pedal hair.   Gross sensation is intact bilaterally.   Equinus is noted bilaterally. No pain with active or passive ROM of the ankle, MTJ, 1st ray,  or halluces bilaterally,.   Nails of bilateral halluces are thickened and mycotic. No open lesions are noted.     Assessment: BL hallux onychomycosis.     Plan:  - Pt seen and evaluated.  - Not a good candidate for PO Lamisil 2/2 age. Topicals are largely ineffective at curing mycosis. Recommend that she keep the nails trimmed back.   - Nails debrided x 2.   - See again PRN.          Efrain Barbosa, DHARMESH

## 2023-03-09 ENCOUNTER — TELEPHONE (OUTPATIENT)
Dept: INTERNAL MEDICINE | Facility: CLINIC | Age: 88
End: 2023-03-09
Payer: MEDICARE

## 2023-03-09 NOTE — TELEPHONE ENCOUNTER
[facility] Call Center    Phone Message    May a detailed message be left on voicemail: yes     Reason for Call: Medication Question or concern regarding medication   Prescription Clarification  Name of Medication: rivaroxaban ANTICOAGULANT (XARELTO) 20 MG TABS tablet     Prescribing Provider: Dr. Davenport      What on the order needs clarification? Pt wanting to know if she should keep taking the above medication

## 2023-03-10 NOTE — TELEPHONE ENCOUNTER
"Per Dr. Davenport last note:  \" re-initiation of anticoagulation for extensive LE DVT. She reports the loose stools have resolved. Fecal studies were concerning for an inflammatory/infectious process with fecal calprotectin and lactoferrin above normal limits. Not in the classic IBD flare range. Had scheduled colonoscopy but by the time it was scheduled, her symptoms resolved. We had also restarted rivaroxaban and re-assessed clot burden and the ultrasound was reassuring. Clinical exam still shows asymmetric left lower extremity swelling but only non-occlusive thrombus seen compared to previous exam in 9/2022. Tolerating meds without problems. No falls.\"    Medication was ordered for year supply. MyChart sent to patient to continue taking.     DEAN COTTO RN on 3/10/2023 at 7:35 AM    "

## 2023-06-01 ENCOUNTER — HEALTH MAINTENANCE LETTER (OUTPATIENT)
Age: 88
End: 2023-06-01

## 2023-07-10 ENCOUNTER — OFFICE VISIT (OUTPATIENT)
Dept: INTERNAL MEDICINE | Facility: CLINIC | Age: 88
End: 2023-07-10
Payer: MEDICARE

## 2023-07-10 VITALS
BODY MASS INDEX: 27.54 KG/M2 | HEIGHT: 65 IN | DIASTOLIC BLOOD PRESSURE: 65 MMHG | SYSTOLIC BLOOD PRESSURE: 131 MMHG | WEIGHT: 165.3 LBS | OXYGEN SATURATION: 96 % | HEART RATE: 56 BPM

## 2023-07-10 DIAGNOSIS — I10 ESSENTIAL HYPERTENSION, BENIGN: Primary | ICD-10-CM

## 2023-07-10 DIAGNOSIS — E78.01 FAMILIAL HYPERCHOLESTEROLEMIA: ICD-10-CM

## 2023-07-10 PROCEDURE — 99214 OFFICE O/P EST MOD 30 MIN: CPT | Performed by: INTERNAL MEDICINE

## 2023-07-10 NOTE — PATIENT INSTRUCTIONS
1) Get a TDaP booster at your local pharmacy  2) Remember to fast for 10 hours before your blood draw in October

## 2023-07-10 NOTE — PROGRESS NOTES
"S) Ms. Mata is an 86 yo woman with a h/o HTN, hyperlipidemia, and extensive unprovoked LE DVT seen for on-going care. Doing well without new problems. Happy and finding josé miguel every day in time with friends, camping with friend Linus, Mu-ism activities. Not interested in stopping the escitalopram as she reflects on a previous life of ups and downs. Brother attempted suicide and father completed suicide. No chest pain, dyspnea, changes in hearing, lumps or moles. Vision impaired by cataract-- scheduled extraction in 10/2023. Meds reviewed and renewed. Vaccines reviewed.     Current Outpatient Medications   Medication     Calcium Carbonate (CALCIUM 600 PO)     escitalopram (LEXAPRO) 10 MG tablet     fish oil-omega-3 fatty acids 1000 MG capsule     Glucosamine 500 MG CAPS     lisinopril (ZESTRIL) 10 MG tablet     Multiple Vitamin (MULTI-VITAMIN) per tablet     rivaroxaban ANTICOAGULANT (XARELTO) 20 MG TABS tablet     simvastatin (ZOCOR) 20 MG tablet     bisacodyl (DULCOLAX) 5 MG EC tablet     diclofenac (VOLTAREN) 1 % topical gel     diclofenac (VOLTAREN) 1 % topical gel     No current facility-administered medications for this visit.       O) /65 (BP Location: Right arm, Patient Position: Sitting, Cuff Size: Adult Regular)   Pulse 56   Ht 1.651 m (5' 5\")   Wt 75 kg (165 lb 4.8 oz)   SpO2 96%   BMI 27.51 kg/m    Well appearing  Bright affect  No mobility issues- nl gait, balance  Nl mentation  Cor RRR noMRG  Lungs clear  Abdomen soft/benign  No LE edema  Joints without acute synovitis      A/P) 1) HCM. Immunizations reviewed and TDaP recommended at local pharmacy.  2) HTN. BP in target range on lisinopril 10 mg daily with nl BMP in 9/2022 (repeat in October).  3) Hyperlipidemia. Repeat lipids. No significant SEs noted on simvastatin 20 mg/d. No changes.  4) DVT with lifelong anticoagulation. No bleeding events. Continue rivaroxaban.  5) Depression. Well controlled on escitalopram 10 mg/d without SEs-- no " change.   6) Cataract extraction scheduled for October.   7) COVID risk- higher risk of poor outcome related to age and HTN. Mask in high risk setting. Paxlovid if positive. Bivalent booster recently.     30 minutes spent on the date of the encounter performing chart review, history and exam, documentation and further activities as noted above.    [place] Davenport MD  Primary Care Center  [facility]

## 2023-07-10 NOTE — NURSING NOTE
Lynnette Mata is a 87 year old female patient that presents today in clinic for the following:    Chief Complaint   Patient presents with     Follow Up     The patient's allergies and medications were reviewed as noted. A set of vitals were recorded as noted without incident. The patient does not have any other questions for the provider.    Kandace Almanza, EMT at 10:57 AM on 7/10/2023

## 2023-10-09 ENCOUNTER — LAB (OUTPATIENT)
Dept: LAB | Facility: CLINIC | Age: 88
End: 2023-10-09
Payer: MEDICARE

## 2023-10-09 ENCOUNTER — OFFICE VISIT (OUTPATIENT)
Dept: INTERNAL MEDICINE | Facility: CLINIC | Age: 88
End: 2023-10-09
Payer: MEDICARE

## 2023-10-09 VITALS
OXYGEN SATURATION: 95 % | DIASTOLIC BLOOD PRESSURE: 71 MMHG | HEART RATE: 63 BPM | HEIGHT: 65 IN | WEIGHT: 165 LBS | SYSTOLIC BLOOD PRESSURE: 155 MMHG | BODY MASS INDEX: 27.49 KG/M2

## 2023-10-09 DIAGNOSIS — F32.1 MODERATE MAJOR DEPRESSION (H): ICD-10-CM

## 2023-10-09 DIAGNOSIS — E78.01 FAMILIAL HYPERCHOLESTEROLEMIA: ICD-10-CM

## 2023-10-09 DIAGNOSIS — I10 ESSENTIAL HYPERTENSION, BENIGN: Primary | ICD-10-CM

## 2023-10-09 DIAGNOSIS — I10 ESSENTIAL HYPERTENSION, BENIGN: ICD-10-CM

## 2023-10-09 DIAGNOSIS — I82.402 DEEP VEIN THROMBOSIS (DVT) OF LEFT LOWER EXTREMITY, UNSPECIFIED CHRONICITY, UNSPECIFIED VEIN (H): ICD-10-CM

## 2023-10-09 DIAGNOSIS — H25.9 AGE-RELATED CATARACT OF BOTH EYES, UNSPECIFIED AGE-RELATED CATARACT TYPE: ICD-10-CM

## 2023-10-09 LAB
ANION GAP SERPL CALCULATED.3IONS-SCNC: 8 MMOL/L (ref 7–15)
BUN SERPL-MCNC: 23.1 MG/DL (ref 8–23)
CALCIUM SERPL-MCNC: 9.9 MG/DL (ref 8.8–10.2)
CHLORIDE SERPL-SCNC: 104 MMOL/L (ref 98–107)
CHOLEST SERPL-MCNC: 197 MG/DL
CREAT SERPL-MCNC: 0.81 MG/DL (ref 0.51–0.95)
DEPRECATED HCO3 PLAS-SCNC: 28 MMOL/L (ref 22–29)
EGFRCR SERPLBLD CKD-EPI 2021: 69 ML/MIN/1.73M2
GLUCOSE SERPL-MCNC: 109 MG/DL (ref 70–99)
HDLC SERPL-MCNC: 83 MG/DL
LDLC SERPL CALC-MCNC: 100 MG/DL
NONHDLC SERPL-MCNC: 114 MG/DL
POTASSIUM SERPL-SCNC: 4.5 MMOL/L (ref 3.4–5.3)
SODIUM SERPL-SCNC: 140 MMOL/L (ref 135–145)
TRIGL SERPL-MCNC: 70 MG/DL

## 2023-10-09 PROCEDURE — 80048 BASIC METABOLIC PNL TOTAL CA: CPT | Performed by: PATHOLOGY

## 2023-10-09 PROCEDURE — 36415 COLL VENOUS BLD VENIPUNCTURE: CPT | Performed by: PATHOLOGY

## 2023-10-09 PROCEDURE — 80061 LIPID PANEL: CPT | Performed by: PATHOLOGY

## 2023-10-09 PROCEDURE — 99214 OFFICE O/P EST MOD 30 MIN: CPT | Performed by: INTERNAL MEDICINE

## 2023-10-09 NOTE — PATIENT INSTRUCTIONS
1) Pre-op will be available in Epic for Dr. Ferris and team  2) Get your RSV, Flu, and COVID vaccines at your pharmacy at your convenience

## 2023-10-09 NOTE — NURSING NOTE
"Lynnette Mata is a 88 year old female patient that presents today in clinic for the following:    Chief Complaint   Patient presents with    Pre-Op Exam     The patient's allergies and medications were reviewed as noted. A set of vitals were recorded as noted without incident: BP (!) 155/71 (BP Location: Right arm, Patient Position: Sitting, Cuff Size: Adult Regular)   Pulse 63   Ht 1.651 m (5' 5\")   Wt 74.8 kg (165 lb)   SpO2 95%   BMI 27.46 kg/m  . The patient does not have any other questions for the provider.    Barbara Ness, EMT at 3:18 PM on 10/9/2023.  Primary care clinic: [phone]    "

## 2023-10-09 NOTE — PROGRESS NOTES
Lynnette Mata is 88 year old female here at the request of Dr. Ferris for cardiovascular, pulmonary, and perioperative risk assessment prior to surgery.The intended surgical procedure is cataract extraction with IOL. A copy of this note will be sent to the surgeon.    Past Medical History:   Diagnosis Date    Asymptomatic menopausal state age 45    Hx HRT use x 6+ years--off with  study findings    Shingles outbreak 2/14/2013    Despite immunization at age 72!       Stricture and stenosis of cervix     Unspecified constipation     hx chronic      PAST SURGICAL HISTORY  NONE    Current Outpatient Medications   Medication    bisacodyl (DULCOLAX) 5 MG EC tablet    Calcium Carbonate (CALCIUM 600 PO)    diclofenac (VOLTAREN) 1 % topical gel    diclofenac (VOLTAREN) 1 % topical gel    escitalopram (LEXAPRO) 10 MG tablet    fish oil-omega-3 fatty acids 1000 MG capsule    Glucosamine 500 MG CAPS    lisinopril (ZESTRIL) 10 MG tablet    Multiple Vitamin (MULTI-VITAMIN) per tablet    rivaroxaban ANTICOAGULANT (XARELTO) 20 MG TABS tablet    simvastatin (ZOCOR) 20 MG tablet     No current facility-administered medications for this visit.     Immunization History   Administered Date(s) Administered    COVID-19 Bivalent 12+ (Pfizer) 11/07/2022    COVID-19 Bivalent 18+ (Moderna) 06/15/2023    COVID-19 MONOVALENT 12+ (Pfizer) 01/20/2021, 02/10/2021, 10/21/2021    COVID-19 Monovalent 12+ (Pfizer 2022) 05/03/2022    Flu 65+ Years 09/21/2019, 11/01/2022    Flu, Unspecified 10/29/1997    Hepatitis A (ADULT 19+) 08/02/2017    Influenza (H1N1) 01/08/2010    Influenza (High Dose) 3 valent vaccine 10/05/2015, 10/06/2016, 10/04/2017, 10/22/2018    Influenza (IIV3) PF 10/29/1997, 10/04/2010, 10/17/2011, 10/26/2012, 10/05/2013, 10/06/2013    Influenza Vaccine 65+ (FLUAD) 09/27/2021    Influenza Vaccine 65+ (Fluzone HD) 10/06/2020    Pneumo Conj 13-V (2010&after) 07/08/2015    Pneumococcal 23 valent 12/03/2008    TD,PF 7+ (Tenivac)  03/09/2006    TDAP Vaccine (Boostrix) 05/02/2012    Td (Adult), Adsorbed 03/09/2006    Typhoid IM 08/02/2017    Zoster recombinant adjuvanted (SHINGRIX) 11/18/2019    Zoster vaccine, live 04/19/2007     This is a LOW risk surgery.    HPI:   Reason for surgery:  Progressive cataracts impacting vision.    Cardiovascular Risk:  This patient ambulates without assist. without chest pain. She IS able to climb a flight of stairs without chest pain.    The patient does not have chest pain with exercise.    She does not have a history of known cardiac disease.   The patient does not have a history of stroke, and does not have a history of valvular disease.    Pulmonary Risk:  In terms of risk factors for pulmonary complications, the patient does not have a history of lung disease or chronic tobacco use.    Perioperative Complications:  The patient does have a h/o unprovoked DVT now over a year past and is on rivaroxaban oral therapy daily. The patient has not had past surgeries.The patient does not have a family history of any anesthesia or surgical complications.      ROS:  Constitutional: no fevers, night sweats or unintentional weight change   Eyes: cataracts  Ears, Nose, Mouth, Throat: no tinnitus or hearing change, no epistaxis or nasal discharge, no oral lesions, throat clear   Cardiovascular: no chest pain, palpitations, or pain with walking, no orthopnea or PND - CAC score of 0  Respiratory: no dyspnea, cough, shortness of breath or wheezing   GI: no nausea, vomiting, diarrhea or constipation, no abdominal pain   : no change in urine, no dysuria or hematuria  Musculoskeletal: no joint or muscle pain or swelling   Integumentary: no concerning lesions or moles   Neuro: no loss of strength or sensation, no numbness or tingling, no tremor, no dizziness, no headache   Endo: no polyuria or polydipsia, no temperature intolerance   Heme/Lymph: no concerning bumps, no bleeding problems   Allergy: no environmental allergies  "  Psych: no depression or anxiety, no sleep problems      PHYSICAL EXAM:  BP (!) 155/71 (BP Location: Right arm, Patient Position: Sitting, Cuff Size: Adult Regular)   Pulse 63   Ht 1.651 m (5' 5\")   Wt 74.8 kg (165 lb)   SpO2 95%   BMI 27.46 kg/m      Wt Readings from Last 1 Encounters:   10/09/23 74.8 kg (165 lb)       Constitutional: no distress, comfortable, pleasant   Eyes: anicteric, normal extra-ocular movements   Ears, Nose and Throat: tympanic membranes clear, nose clear and free of lesions, throat clear, neck supple with full range of motion, no thyromegaly.   Cardiovascular: regular rate and rhythm, normal S1 and S2, no murmurs, rubs or gallops, peripheral pulses full and symmetric   Respiratory: clear to auscultation, no wheezes or crackles, normal breath sounds   Gastrointestinal: positive bowel sounds, nontender, no hepatosplenomegaly, no masses   Musculoskeletal: full range of motion, no edema   Skin: no concerning lesions, no jaundice   Neurological: normal strength and sensation, reflexes at patella and biceps normal, normal gait, no tremor   Psychological: appropriate mood   Lymphatic: no cervical, axillary or inguinal lymphadenopathy    A/P:    The patient with   Past Medical History:   Diagnosis Date    Asymptomatic menopausal state age 45    Hx HRT use x 6+ years--off with [facility] study findings    Shingles outbreak 2/14/2013    Despite immunization at age 72!       Stricture and stenosis of cervix     Unspecified constipation     hx chronic    presents prior to surgery for assessment of perioperative risk. The patient is at LOW risk for cardiovascular complications and at LOW risk for pulmonary complications of this LOW risk surgery.    ASA class 2 - Mild systemic disease  No evidence of functionally compromising cardiac or pulmonary status  The patient is instructed as to which medications to take with sips of water the morning of surgery (all of them)  For simple cataract extraction with IOL, " no change in her medications is recommended including her anticoagulation with rivaroxaban.   (Reference: Bogdan X, Mendoza AF, Nani RS, Fawad J, Sam AE. Perioperative Management of Anticoagulants in Ocular Surgeries. Int Ophthalmol Clin. 2020 Summer;60(3):3-15. doi: 10.1097/[id number]. PMID: 57931222; PMCID: [id number].)    GENERAL PREOP INSTRUCTIONS:  Proceed with surgery as planned.  No food or liquids the morning of surgery.  Call surgeon if develops respiratory illness, fever, or other illness.  Take all regular medications the morning of surgery with a sip of water     Laboratory studies:  None  Pregnancy testing was not indicated.    Cardiovascular: EKG was not indicated based on risk assessment.      Please contact our office if there are any further questions or information required about this patient.    Nickolas Davenport MD

## 2023-10-11 NOTE — PROGRESS NOTES
Surgeon (please enter first and last name): Dr Ferris  Fax number for Preop Evaluation: [phone]  Location of Surgery: [facility]  Date of Surgery: 10/19/2023  Procedure: IOL  History of reaction to anesthesia? No    Barbara Ness, EMT at 7:06 PM on 10/10/2023.  Primary care clinic: [phone]

## 2023-11-08 ENCOUNTER — ALLIED HEALTH/NURSE VISIT (OUTPATIENT)
Dept: INTERNAL MEDICINE | Facility: CLINIC | Age: 88
End: 2023-11-08
Payer: MEDICARE

## 2023-11-08 DIAGNOSIS — H61.23 BILATERAL IMPACTED CERUMEN: Primary | ICD-10-CM

## 2023-11-08 PROCEDURE — 69209 REMOVE IMPACTED EAR WAX UNI: CPT | Mod: 50

## 2023-11-08 NOTE — PROGRESS NOTES
Lynnette Mata presents in the primary clinic today at the request of the patient for an ear wash. The patient's ears were assessed for cerumen. After this, an ear wash was performed in which a small amount of cerumen was removed from the patient's right and left ear/s. After the ear wash, the tympanic membrane was visible. The ear wash was provided today under the supervision of Dr. Orellana who was present if help was needed.    Anne-Marie Huang, EMT at 11:26 AM on 11/8/2023.  Primary Care Clinic: [phone]

## 2023-11-08 NOTE — PATIENT INSTRUCTIONS
Today, you received a bilateral ear wash. I recommend trying Debrox (OTC) and gentle rinsing in the shower. After you have gotten out of the shower, make sure to gently tilt your head to release and dry any residual water from ear canals. Please call [phone] to make your next ear wash Nurse visit.

## 2023-12-13 DIAGNOSIS — I10 ESSENTIAL HYPERTENSION, BENIGN: ICD-10-CM

## 2023-12-13 DIAGNOSIS — R26.89 BALANCE PROBLEMS: ICD-10-CM

## 2023-12-13 DIAGNOSIS — Z91.81 PERSONAL HISTORY OF FALL: ICD-10-CM

## 2023-12-13 DIAGNOSIS — I82.402 DEEP VEIN THROMBOSIS (DVT) OF LEFT LOWER EXTREMITY, UNSPECIFIED CHRONICITY, UNSPECIFIED VEIN (H): ICD-10-CM

## 2023-12-13 DIAGNOSIS — M62.81 GENERALIZED MUSCLE WEAKNESS: ICD-10-CM

## 2023-12-18 RX ORDER — LISINOPRIL 10 MG/1
10 TABLET ORAL DAILY
Qty: 90 TABLET | Refills: 0 | Status: SHIPPED | OUTPATIENT
Start: 2023-12-18 | End: 2023-12-19

## 2023-12-19 ENCOUNTER — TELEPHONE (OUTPATIENT)
Dept: INTERNAL MEDICINE | Facility: CLINIC | Age: 88
End: 2023-12-19
Payer: MEDICARE

## 2023-12-19 DIAGNOSIS — Z91.81 PERSONAL HISTORY OF FALL: ICD-10-CM

## 2023-12-19 DIAGNOSIS — M62.81 GENERALIZED MUSCLE WEAKNESS: ICD-10-CM

## 2023-12-19 DIAGNOSIS — I10 ESSENTIAL HYPERTENSION, BENIGN: ICD-10-CM

## 2023-12-19 DIAGNOSIS — R26.89 BALANCE PROBLEMS: ICD-10-CM

## 2023-12-19 RX ORDER — LISINOPRIL 10 MG/1
10 TABLET ORAL DAILY
Qty: 90 TABLET | Refills: 0 | Status: SHIPPED | OUTPATIENT
Start: 2023-12-19 | End: 2024-02-12

## 2023-12-19 NOTE — TELEPHONE ENCOUNTER
[facility] Call Center    Phone Message    May a detailed message be left on voicemail: yes     Reason for Call: Medication Question or concern regarding medication   Prescription Clarification  Name of Medication: lisinopril (ZESTRIL) 10 MG tablet   Prescribing Provider:     Pharmacy: [id number] - [place], [place] - [address]    What on the order needs clarification?   Per pt the pharmacy is needing  approve to release medication.       Action Taken: Message routed to:  [facility] ([facility]    Travel Screening: Not Applicable

## 2024-01-05 DIAGNOSIS — F32.1 MODERATE MAJOR DEPRESSION (H): ICD-10-CM

## 2024-01-09 RX ORDER — ESCITALOPRAM OXALATE 10 MG/1
10 TABLET ORAL DAILY
Qty: 90 TABLET | Refills: 0 | Status: SHIPPED | OUTPATIENT
Start: 2024-01-09 | End: 2024-02-12

## 2024-01-09 NOTE — TELEPHONE ENCOUNTER
escitalopram (LEXAPRO) 10 MG tablet 90 tablet 3 12/12/2022     Last Office Visit: 10/9/23  Future Office visit:   none    PHQ-9 Total Score 12/12/22--0    90 day kavon refill sent to the pharmacy. Overdue PHQ-9 per the refill protocol     Ml Velásquez RN  P Red Flag Triage/MRT

## 2024-02-12 ENCOUNTER — OFFICE VISIT (OUTPATIENT)
Dept: INTERNAL MEDICINE | Facility: CLINIC | Age: 89
End: 2024-02-12
Payer: MEDICARE

## 2024-02-12 VITALS
SYSTOLIC BLOOD PRESSURE: 145 MMHG | DIASTOLIC BLOOD PRESSURE: 69 MMHG | HEART RATE: 70 BPM | WEIGHT: 169.7 LBS | BODY MASS INDEX: 28.24 KG/M2 | OXYGEN SATURATION: 94 %

## 2024-02-12 DIAGNOSIS — R07.89 ATYPICAL CHEST PAIN: ICD-10-CM

## 2024-02-12 DIAGNOSIS — R26.89 BALANCE PROBLEMS: ICD-10-CM

## 2024-02-12 DIAGNOSIS — Z91.81 PERSONAL HISTORY OF FALL: ICD-10-CM

## 2024-02-12 DIAGNOSIS — Z29.11 NEED FOR VACCINATION AGAINST RESPIRATORY SYNCYTIAL VIRUS: ICD-10-CM

## 2024-02-12 DIAGNOSIS — M62.81 GENERALIZED MUSCLE WEAKNESS: ICD-10-CM

## 2024-02-12 DIAGNOSIS — F32.1 MODERATE MAJOR DEPRESSION (H): ICD-10-CM

## 2024-02-12 DIAGNOSIS — E78.01 FAMILIAL HYPERCHOLESTEROLEMIA: ICD-10-CM

## 2024-02-12 DIAGNOSIS — I82.402 DEEP VEIN THROMBOSIS (DVT) OF LEFT LOWER EXTREMITY, UNSPECIFIED CHRONICITY, UNSPECIFIED VEIN (H): ICD-10-CM

## 2024-02-12 DIAGNOSIS — I10 ESSENTIAL HYPERTENSION, BENIGN: ICD-10-CM

## 2024-02-12 PROCEDURE — 99214 OFFICE O/P EST MOD 30 MIN: CPT | Performed by: INTERNAL MEDICINE

## 2024-02-12 RX ORDER — LISINOPRIL 10 MG/1
10 TABLET ORAL DAILY
Qty: 90 TABLET | Refills: 3 | Status: SHIPPED | OUTPATIENT
Start: 2024-02-12 | End: 2024-09-30

## 2024-02-12 RX ORDER — ESCITALOPRAM OXALATE 10 MG/1
10 TABLET ORAL DAILY
Qty: 90 TABLET | Refills: 3 | Status: SHIPPED | OUTPATIENT
Start: 2024-02-12 | End: 2024-09-30

## 2024-02-12 RX ORDER — SIMVASTATIN 20 MG
20 TABLET ORAL DAILY
Qty: 90 TABLET | Refills: 3 | Status: SHIPPED | OUTPATIENT
Start: 2024-02-12 | End: 2024-09-30

## 2024-02-12 RX ORDER — RESPIRATORY SYNCYTIAL VIRUS VACCINE 120MCG/0.5
0.5 KIT INTRAMUSCULAR ONCE
Qty: 1 EACH | Refills: 0 | Status: CANCELLED | OUTPATIENT
Start: 2024-02-12 | End: 2024-02-12

## 2024-02-12 NOTE — PROGRESS NOTES
Lynnette is a 88 year old that presents in clinic today for the following:     Chief Complaint   Patient presents with    Back Pain     Pt would like to discuss sciatica            2/12/2024     4:54 PM   Additional Questions   Roomed by JONAS     Screenings from encounters over the past 10 days    No data recorded       Bobby Melva at 4:59 PM on 2/12/2024    S) Ms. Mata is an 89 yo woman with HTN, hyperlipidemia, lifelong anticoagulation for DVT, and depression (in remission on escitalopram) seen for ongoing care. Wellness screening discussed: Fall risk is low (no falls in past year and coordination good). PHQ9 score of 0. RUDI 7 score of 0.  and no sexually active. Home safety review completed and good. No hearing or vision issues (better after cataract extraction). Diet and exercise is excellent. No drugs and 2 drinks of wine per week. RSV vax discussed (she get this at her pharmacy).   ROS remarkable for left buttock discomfort with sitting for a long time. No pain down leg past knee. Stools more loose than firm but not diarrheal. No blood or mucous.    Current Outpatient Medications   Medication    Calcium Carbonate (CALCIUM 600 PO)    escitalopram (LEXAPRO) 10 MG tablet    fish oil-omega-3 fatty acids 1000 MG capsule    Glucosamine 500 MG CAPS    lisinopril (ZESTRIL) 10 MG tablet    Multiple Vitamin (MULTI-VITAMIN) per tablet    rivaroxaban ANTICOAGULANT (XARELTO ANTICOAGULANT) 20 MG TABS tablet    simvastatin (ZOCOR) 20 MG tablet     No current facility-administered medications for this visit.         O) BP (!) 145/69 (BP Location: Right arm, Patient Position: Sitting, Cuff Size: Adult Regular)   Pulse 70   Wt 77 kg (169 lb 11.2 oz)   SpO2 94%   BMI 28.24 kg/m    Well appearing  HEENT nl  Cor RRR no MRG  Lungs clear  Abd benign  LEs with trace edema right >left  No rashes  Joints nl  Mentation clear  A/P)   1) HCM. Immunizations reviewed and UTD except for RSV-- she'll get this at her local  "pharmacy.  2) HTN. BP slightly elevated today and home readings have been in target range. No changes.  3) Hyperlipidemia and ASCVD risk  Recent Labs   Lab Test 10/09/23  1601 06/26/19  1037   CHOL 197 172   HDL 83 89    64   TRIG 70 97      No significant SEs noted on simvastatin 20 mg/d. No changes.    4) DVT with lifelong anticoagulation. No bleeding events. Continue rivaroxaban.  5) Depression. Well controlled on escitalopram 10 mg/d without SEs-- no change. She calls it her \"happy pill\" and doesn't want to change it.   6) Cataract extraction completed without complications.  7) COVID risk- higher risk of poor outcome related to age and HTN. Mask in high risk setting. Paxlovid if positive. Bivalent booster UTD.  8) Chronic LBP without sciatica. Negative SLRs- lumbar support and core strength exercises discussed.     30 minutes spent on the date of the encounter performing chart review, history and exam, documentation and further activities as noted above.    North Davenport MD  Primary Care Center  [facility]      "

## 2024-06-15 ENCOUNTER — HEALTH MAINTENANCE LETTER (OUTPATIENT)
Age: 89
End: 2024-06-15

## 2024-09-09 ENCOUNTER — TELEPHONE (OUTPATIENT)
Dept: INTERNAL MEDICINE | Facility: CLINIC | Age: 89
End: 2024-09-09
Payer: MEDICARE

## 2024-09-12 DIAGNOSIS — M54.50 MIDLINE LOW BACK PAIN WITHOUT SCIATICA, UNSPECIFIED CHRONICITY: Primary | ICD-10-CM

## 2024-09-12 NOTE — TELEPHONE ENCOUNTER
Left a detailed message to the pt regarding sports medicine clinic referral. She will be called from them for  scheduling, If she doesn't hear from a representative within 2 business days, she may call [phone].

## 2024-09-26 ENCOUNTER — OFFICE VISIT (OUTPATIENT)
Dept: PHYSICAL MEDICINE AND REHAB | Facility: CLINIC | Age: 89
End: 2024-09-26
Attending: INTERNAL MEDICINE
Payer: MEDICARE

## 2024-09-26 VITALS
HEIGHT: 65 IN | WEIGHT: 158.3 LBS | BODY MASS INDEX: 26.37 KG/M2 | SYSTOLIC BLOOD PRESSURE: 117 MMHG | DIASTOLIC BLOOD PRESSURE: 57 MMHG | HEART RATE: 80 BPM

## 2024-09-26 DIAGNOSIS — G89.29 CHRONIC LEFT-SIDED LOW BACK PAIN WITHOUT SCIATICA: Primary | ICD-10-CM

## 2024-09-26 DIAGNOSIS — M79.18 LEFT BUTTOCK PAIN: ICD-10-CM

## 2024-09-26 DIAGNOSIS — R26.89 BALANCE PROBLEMS: ICD-10-CM

## 2024-09-26 DIAGNOSIS — M54.50 CHRONIC LEFT-SIDED LOW BACK PAIN WITHOUT SCIATICA: Primary | ICD-10-CM

## 2024-09-26 PROCEDURE — 99203 OFFICE O/P NEW LOW 30 MIN: CPT | Performed by: NURSE PRACTITIONER

## 2024-09-26 ASSESSMENT — PAIN SCALES - GENERAL: PAINLEVEL: MILD PAIN (2)

## 2024-09-26 NOTE — PROGRESS NOTES
ASSESSMENT: Lynnette Mata is a 89 year old female who presents for consultation at the request of PCP Nickolas Davenport, with a past medical history significant for hypertension, bilateral knee osteoarthritis, who presents today for new patient evaluation of:    -Previous left low back pain over the last 4 weeks with no known injury that has resolved for the most part on its own and is very minimal at this time.  No current radicular leg pain.    -Patient does have ongoing balance problems does use walking poles for walking.    Patient is neurologically intact on exam. No myelopathic or red flag symptoms.          No data to display                     Diagnoses and all orders for this visit:  Chronic left-sided low back pain without sciatica  -     Physical Therapy  Referral; Future  -     diclofenac (VOLTAREN) 1 % topical gel; Apply 2-4 g topically 3 times daily as needed for moderate pain.  Left buttock pain  -     Physical Therapy  Referral; Future  -     diclofenac (VOLTAREN) 1 % topical gel; Apply 2-4 g topically 3 times daily as needed for moderate pain.  Balance problems  -     Physical Therapy  Referral; Future  Other orders  -     Spine  Referral    PLAN:  Reviewed spine anatomy and disease process. Discussed diagnosis and treatment options with the patient today. A shared decision making model was used.  The patient's values and choices were respected. The following represents what was discussed and decided upon by the provider and the patient.      -DIAGNOSTIC TESTS:   -- Discussed with patient that if her left low back pain worsens or returns I would recommend lumbar spine MRI.  Given her pain has significantly improved recommend holding off on imaging at this time.    -PHYSICAL THERAPY: Referral to physical therapy placed to address home exercises for her left low back pain as well as working on balance and generalized strengthening  Discussed the  importance of core strengthening, ROM, stretching exercises with the patient and how each of these entities is important in decreasing pain.  Explained to the patient that the purpose of physical therapy is to teach the patient a home exercise program.  These exercises need to be performed every day in order to decrease pain and prevent future occurrences of pain.        -MEDICATIONS: Advised patient that she can continue with acetaminophen as needed.  Did also prescribe diclofenac gel that she can utilize 3 times daily as needed for left low back pain.  She is not a candidate for oral NSAIDs due to Xarelto anticoagulation therapy.  Discussed multiple medication options today with patient. Discussed risks, side effects, and proper use of medications. Patient verbalized understanding.    -INTERVENTIONS: No recommendations for injections at this time  Discussed risks and benefits of injections with patient today.    -PATIENT EDUCATION:  Total time of 32 minutes, on the day of service, spent with the patient, reviewing the chart, placing orders, and documenting.     -FOLLOW-UP:   Follow-up as needed    Advised patient to call the Spine Center if symptoms worsen or you have problems controlling bladder and bowel function.   ______________________________________________________________________    SUBJECTIVE:  HPI:  Lynnette Mata  Is a 89 year old female who presents today for new patient evaluation of low back pain left lower lumbar spine into the left buttock that started approximately 6 weeks ago without injury and was significant with walking and sitting however over the last week or so symptoms have dramatically improved.  She does report that she almost canceled her appointment today but did want to still have a discussion about this as she is concerned that it may come back.  Currently her pain today is very minimal at a 2/10.  She otherwise denies any radiating lower extremity pain.  Denies numbness or  tingling sensations in her lower extremities.  Denies any recent trips or falls.  She does report that she uses walking sticks to walk for stability and this has been something she is down for a while.  She does report that she occasionally has difficulties transitioning to a standing position from a chair without using her arms as well.  Denies saddle anesthesia.    -Treatment to Date: No prior spinal surgery or spinal injections.  No physical therapy recently for her back.  She does report that she is done physical therapy for various things in the past.    -Medications:    Current Outpatient Medications   Medication Sig Dispense Refill    diclofenac (VOLTAREN) 1 % topical gel Apply 2-4 g topically 3 times daily as needed for moderate pain. 150 g 3    rivaroxaban ANTICOAGULANT (XARELTO ANTICOAGULANT) 20 MG TABS tablet Take 1 tablet (20 mg) by mouth daily (with dinner) 90 tablet 3    Calcium Carbonate (CALCIUM 600 PO) Take  by mouth daily.      escitalopram (LEXAPRO) 10 MG tablet Take 1 tablet (10 mg) by mouth daily 90 tablet 3    fish oil-omega-3 fatty acids 1000 MG capsule Take 2 g by mouth daily.      Glucosamine 500 MG CAPS Take  by mouth daily.      lisinopril (ZESTRIL) 10 MG tablet Take 1 tablet (10 mg) by mouth daily 90 tablet 3    Multiple Vitamin (MULTI-VITAMIN) per tablet Take 1 tablet by mouth daily.      simvastatin (ZOCOR) 20 MG tablet Take 1 tablet (20 mg) by mouth daily 90 tablet 3     No current facility-administered medications for this visit.       No Known Allergies    Past Medical History:   Diagnosis Date    Asymptomatic menopausal state age 45    Hx HRT use x 6+ years--off with [facility] study findings    Shingles outbreak 2/14/2013    Despite immunization at age 72!       Stricture and stenosis of cervix     Unspecified constipation     hx chronic        Patient Active Problem List   Diagnosis    Essential hypertension, benign    Post-menopause    Cervical stenosis (uterine cervix)    Knee pain,  "bilateral    Encounter for routine gynecological examination    Primary osteoarthritis of both knees    Genital atrophy of female    ACP (advance care planning)    Abnormal cardiovascular stress test    Neurogenic claudication       Past Surgical History:   Procedure Laterality Date    COLONOSCOPY  4.15.09    (Fiberoptic) WNL -- ami in 10 yrs       Family History   Problem Relation Age of Onset    Myocardial Infarction Mother 74    Cerebrovascular Disease Maternal Grandmother 74    Cancer - colorectal Maternal Aunt 68    Endocrine Disease Brother 10        Type I Diabetes Mellitus    Psychotic Disorder Brother         bipolar    C.A.D. Brother     C.A.D. Father     Psychotic Disorder Father 49        suicide    Diabetes Maternal Grandfather 65        DMII    Parkinsonism Other                Reviewed past medical, surgical, and family history with patient found on new patient intake packet located in EMR Media tab.     SOCIAL HX: Patient is , retired.  Patient denies smoking/tobacco use.  Does report occasional alcohol use but denies history being a heavy drinker, denies recreational drug use.    ROS: Positive for muscle pain, muscle fatigue, sciatica, depression.  Specifically negative for bowel/bladder dysfunction, balance changes, headache, dizziness, foot drop, fevers, chills, appetite changes, nausea/vomiting, unexplained weight loss. Otherwise 13 systems reviewed are negative. Please see the patient's intake questionnaire from today for details.    OBJECTIVE:  /57 (BP Location: Left arm, Patient Position: Sitting, Cuff Size: Adult Regular)   Pulse 80   Ht 5' 5\" (1.651 m)   Wt 158 lb 4.8 oz (71.8 kg)   BMI 26.34 kg/m      PHYSICAL EXAMINATION:    --CONSTITUTIONAL:  Vital signs as above.  No acute distress.  The patient is well nourished and well groomed.  --PSYCHIATRIC:  Appropriate mood and affect. The patient is awake, alert, oriented to person, place, time and answering questions " appropriately with clear speech.    --SKIN:  Skin over the face, bilateral lower extremities, and posterior torso is clean, dry, intact without rashes.    --RESPIRATORY: Normal rhythm and effort. No abnormal accessory muscle breathing patterns noted.   --STANDING EXAMINATION:  Normal lumbar lordosis noted, no lateral shift.  --MUSCULOSKELETAL: Range of motion is not limited in lumbar flexion, extension, lateral rotation. No tenderness to palpation lumbar spine.   RESULTS: Prior medical records from [facility] were reviewed today.

## 2024-09-26 NOTE — LETTER
9/26/2024      Lynnette Mata  [address]  [facility] [address]      Dear Colleague,    Thank you for referring your patient, Lynnette Mata, to the [facility]. Please see a copy of my visit note below.    ASSESSMENT: Lynnette Mata is a 89 year old female who presents for consultation at the request of PCP Nickolas Davenport, with a past medical history significant for hypertension, bilateral knee osteoarthritis, who presents today for new patient evaluation of:    -Previous left low back pain over the last 4 weeks with no known injury that has resolved for the most part on its own and is very minimal at this time.  No current radicular leg pain.    -Patient does have ongoing balance problems does use walking poles for walking.    Patient is neurologically intact on exam. No myelopathic or red flag symptoms.          No data to display                     Diagnoses and all orders for this visit:  Chronic left-sided low back pain without sciatica  -     Physical Therapy  Referral; Future  -     diclofenac (VOLTAREN) 1 % topical gel; Apply 2-4 g topically 3 times daily as needed for moderate pain.  Left buttock pain  -     Physical Therapy  Referral; Future  -     diclofenac (VOLTAREN) 1 % topical gel; Apply 2-4 g topically 3 times daily as needed for moderate pain.  Balance problems  -     Physical Therapy  Referral; Future  Other orders  -     Spine  Referral    PLAN:  Reviewed spine anatomy and disease process. Discussed diagnosis and treatment options with the patient today. A shared decision making model was used.  The patient's values and choices were respected. The following represents what was discussed and decided upon by the provider and the patient.      -DIAGNOSTIC TESTS:   -- Discussed with patient that if her left low back pain worsens or returns I would recommend lumbar spine MRI.  Given her pain has  significantly improved recommend holding off on imaging at this time.    -PHYSICAL THERAPY: Referral to physical therapy placed to address home exercises for her left low back pain as well as working on balance and generalized strengthening  Discussed the importance of core strengthening, ROM, stretching exercises with the patient and how each of these entities is important in decreasing pain.  Explained to the patient that the purpose of physical therapy is to teach the patient a home exercise program.  These exercises need to be performed every day in order to decrease pain and prevent future occurrences of pain.        -MEDICATIONS: Advised patient that she can continue with acetaminophen as needed.  Did also prescribe diclofenac gel that she can utilize 3 times daily as needed for left low back pain.  She is not a candidate for oral NSAIDs due to Xarelto anticoagulation therapy.  Discussed multiple medication options today with patient. Discussed risks, side effects, and proper use of medications. Patient verbalized understanding.    -INTERVENTIONS: No recommendations for injections at this time  Discussed risks and benefits of injections with patient today.    -PATIENT EDUCATION:  Total time of 32 minutes, on the day of service, spent with the patient, reviewing the chart, placing orders, and documenting.     -FOLLOW-UP:   Follow-up as needed    Advised patient to call the Spine Center if symptoms worsen or you have problems controlling bladder and bowel function.   ______________________________________________________________________    SUBJECTIVE:  HPI:  Lynnette Mata  Is a 89 year old female who presents today for new patient evaluation of low back pain left lower lumbar spine into the left buttock that started approximately 6 weeks ago without injury and was significant with walking and sitting however over the last week or so symptoms have dramatically improved.  She does report that she almost  canceled her appointment today but did want to still have a discussion about this as she is concerned that it may come back.  Currently her pain today is very minimal at a 2/10.  She otherwise denies any radiating lower extremity pain.  Denies numbness or tingling sensations in her lower extremities.  Denies any recent trips or falls.  She does report that she uses walking sticks to walk for stability and this has been something she is down for a while.  She does report that she occasionally has difficulties transitioning to a standing position from a chair without using her arms as well.  Denies saddle anesthesia.    -Treatment to Date: No prior spinal surgery or spinal injections.  No physical therapy recently for her back.  She does report that she is done physical therapy for various things in the past.    -Medications:    Current Outpatient Medications   Medication Sig Dispense Refill     diclofenac (VOLTAREN) 1 % topical gel Apply 2-4 g topically 3 times daily as needed for moderate pain. 150 g 3     rivaroxaban ANTICOAGULANT (XARELTO ANTICOAGULANT) 20 MG TABS tablet Take 1 tablet (20 mg) by mouth daily (with dinner) 90 tablet 3     Calcium Carbonate (CALCIUM 600 PO) Take  by mouth daily.       escitalopram (LEXAPRO) 10 MG tablet Take 1 tablet (10 mg) by mouth daily 90 tablet 3     fish oil-omega-3 fatty acids 1000 MG capsule Take 2 g by mouth daily.       Glucosamine 500 MG CAPS Take  by mouth daily.       lisinopril (ZESTRIL) 10 MG tablet Take 1 tablet (10 mg) by mouth daily 90 tablet 3     Multiple Vitamin (MULTI-VITAMIN) per tablet Take 1 tablet by mouth daily.       simvastatin (ZOCOR) 20 MG tablet Take 1 tablet (20 mg) by mouth daily 90 tablet 3     No current facility-administered medications for this visit.       No Known Allergies    Past Medical History:   Diagnosis Date     Asymptomatic menopausal state age 45    Hx HRT use x 6+ years--off with  study findings     Shingles outbreak 2/14/2013     "Despite immunization at age 72!        Stricture and stenosis of cervix      Unspecified constipation     hx chronic        Patient Active Problem List   Diagnosis     Essential hypertension, benign     Post-menopause     Cervical stenosis (uterine cervix)     Knee pain, bilateral     Encounter for routine gynecological examination     Primary osteoarthritis of both knees     Genital atrophy of female     ACP (advance care planning)     Abnormal cardiovascular stress test     Neurogenic claudication       Past Surgical History:   Procedure Laterality Date     COLONOSCOPY  4.15.09    (Fiberoptic) WNL -- ami in 10 yrs       Family History   Problem Relation Age of Onset     Myocardial Infarction Mother 74     Cerebrovascular Disease Maternal Grandmother 74     Cancer - colorectal Maternal Aunt 68     Endocrine Disease Brother 10        Type I Diabetes Mellitus     Psychotic Disorder Brother         bipolar     C.A.D. Brother      C.A.D. Father      Psychotic Disorder Father 49        suicide     Diabetes Maternal Grandfather 65        DMII     Parkinsonism Other                Reviewed past medical, surgical, and family history with patient found on new patient intake packet located in EMR Media tab.     SOCIAL HX: Patient is , retired.  Patient denies smoking/tobacco use.  Does report occasional alcohol use but denies history being a heavy drinker, denies recreational drug use.    ROS: Positive for muscle pain, muscle fatigue, sciatica, depression.  Specifically negative for bowel/bladder dysfunction, balance changes, headache, dizziness, foot drop, fevers, chills, appetite changes, nausea/vomiting, unexplained weight loss. Otherwise 13 systems reviewed are negative. Please see the patient's intake questionnaire from today for details.    OBJECTIVE:  /57 (BP Location: Left arm, Patient Position: Sitting, Cuff Size: Adult Regular)   Pulse 80   Ht 5' 5\" (1.651 m)   Wt 158 lb 4.8 oz (71.8 kg)   " BMI 26.34 kg/m      PHYSICAL EXAMINATION:    --CONSTITUTIONAL:  Vital signs as above.  No acute distress.  The patient is well nourished and well groomed.  --PSYCHIATRIC:  Appropriate mood and affect. The patient is awake, alert, oriented to person, place, time and answering questions appropriately with clear speech.    --SKIN:  Skin over the face, bilateral lower extremities, and posterior torso is clean, dry, intact without rashes.    --RESPIRATORY: Normal rhythm and effort. No abnormal accessory muscle breathing patterns noted.   --STANDING EXAMINATION:  Normal lumbar lordosis noted, no lateral shift.  --MUSCULOSKELETAL: Range of motion is not limited in lumbar flexion, extension, lateral rotation. No tenderness to palpation lumbar spine.   RESULTS: Prior medical records from [facility]where were reviewed today.                 Again, thank you for allowing me to participate in the care of your patient.        Sincerely,        Candace Dwyer, CNP

## 2024-09-26 NOTE — PATIENT INSTRUCTIONS
~Spine Center Scheduling #[phone].  ~Please call our [facility] Spine Nurse Navigation #[phone] with any questions or concerns about your treatment plan, if symptoms worsen and you would like to be seen urgently, or if you have problems controlling bladder and bowel function.  ~For any future flareups or new symptoms, recommend follow-up in clinic or contact the nurse navigator line.  ~Please note that any My Chart messages may take multiple days for a response due to the high volume of patients seen in clinic.  Anything sent Thursday night or after will be answered the following week when able, as Candace Dwyer CNP does not work in clinic on Fridays.   ~Candace Dwyer CNP is at the [facility] on Tuesdays, otherwise primarily at the [facility].       ~You have been referred for Physical Therapy to [facility]. They will call you to schedule an appointment.       Scheduling phone number is [phone] [facility] [place], [place], or [place] location.  If you have not heard from the scheduling office within 2 business days, please call [phone] for ALL other locations.     Discussed the importance of core strengthening, ROM, stretching exercises and how each of these entities is important in decreasing pain and improving long term spine health.  The purpose of physical therapy is to teach you an individualized home exercise program.  These exercises need to be performed every day in order to decrease pain and prevent future occurrences of pain.

## 2024-09-30 ENCOUNTER — LAB (OUTPATIENT)
Dept: LAB | Facility: CLINIC | Age: 89
End: 2024-09-30
Payer: MEDICARE

## 2024-09-30 ENCOUNTER — OFFICE VISIT (OUTPATIENT)
Dept: INTERNAL MEDICINE | Facility: CLINIC | Age: 89
End: 2024-09-30
Payer: MEDICARE

## 2024-09-30 VITALS
HEART RATE: 72 BPM | BODY MASS INDEX: 26.33 KG/M2 | SYSTOLIC BLOOD PRESSURE: 146 MMHG | HEIGHT: 65 IN | OXYGEN SATURATION: 98 % | WEIGHT: 158 LBS | DIASTOLIC BLOOD PRESSURE: 64 MMHG

## 2024-09-30 DIAGNOSIS — E78.01 FAMILIAL HYPERCHOLESTEROLEMIA: ICD-10-CM

## 2024-09-30 DIAGNOSIS — R19.7 DIARRHEA, UNSPECIFIED TYPE: ICD-10-CM

## 2024-09-30 DIAGNOSIS — Z29.11 NEED FOR VACCINATION AGAINST RESPIRATORY SYNCYTIAL VIRUS: ICD-10-CM

## 2024-09-30 DIAGNOSIS — I10 ESSENTIAL HYPERTENSION, BENIGN: Primary | ICD-10-CM

## 2024-09-30 DIAGNOSIS — I10 ESSENTIAL HYPERTENSION, BENIGN: ICD-10-CM

## 2024-09-30 DIAGNOSIS — R26.89 BALANCE PROBLEMS: ICD-10-CM

## 2024-09-30 DIAGNOSIS — R07.89 ATYPICAL CHEST PAIN: ICD-10-CM

## 2024-09-30 DIAGNOSIS — Z91.81 PERSONAL HISTORY OF FALL: ICD-10-CM

## 2024-09-30 DIAGNOSIS — I82.402 DEEP VEIN THROMBOSIS (DVT) OF LEFT LOWER EXTREMITY, UNSPECIFIED CHRONICITY, UNSPECIFIED VEIN (H): ICD-10-CM

## 2024-09-30 DIAGNOSIS — F32.1 MODERATE MAJOR DEPRESSION (H): ICD-10-CM

## 2024-09-30 DIAGNOSIS — M62.81 GENERALIZED MUSCLE WEAKNESS: ICD-10-CM

## 2024-09-30 LAB
ANION GAP SERPL CALCULATED.3IONS-SCNC: 9 MMOL/L (ref 7–15)
BUN SERPL-MCNC: 30.3 MG/DL (ref 8–23)
CALCIUM SERPL-MCNC: 9.6 MG/DL (ref 8.8–10.4)
CHLORIDE SERPL-SCNC: 114 MMOL/L (ref 98–107)
CHOLEST SERPL-MCNC: 155 MG/DL
CREAT SERPL-MCNC: 0.87 MG/DL (ref 0.51–0.95)
CRP SERPL-MCNC: 13.7 MG/L
EGFRCR SERPLBLD CKD-EPI 2021: 63 ML/MIN/1.73M2
FASTING STATUS PATIENT QL REPORTED: NO
FASTING STATUS PATIENT QL REPORTED: NO
GLUCOSE SERPL-MCNC: 102 MG/DL (ref 70–99)
HCO3 SERPL-SCNC: 20 MMOL/L (ref 22–29)
HDLC SERPL-MCNC: 65 MG/DL
LDLC SERPL CALC-MCNC: 68 MG/DL
NONHDLC SERPL-MCNC: 90 MG/DL
POTASSIUM SERPL-SCNC: 5.1 MMOL/L (ref 3.4–5.3)
SODIUM SERPL-SCNC: 143 MMOL/L (ref 135–145)
TRIGL SERPL-MCNC: 108 MG/DL

## 2024-09-30 PROCEDURE — 36415 COLL VENOUS BLD VENIPUNCTURE: CPT | Performed by: PATHOLOGY

## 2024-09-30 PROCEDURE — 99214 OFFICE O/P EST MOD 30 MIN: CPT | Performed by: INTERNAL MEDICINE

## 2024-09-30 PROCEDURE — 80061 LIPID PANEL: CPT | Performed by: PATHOLOGY

## 2024-09-30 PROCEDURE — 86140 C-REACTIVE PROTEIN: CPT | Performed by: PATHOLOGY

## 2024-09-30 PROCEDURE — 80048 BASIC METABOLIC PNL TOTAL CA: CPT | Performed by: PATHOLOGY

## 2024-09-30 RX ORDER — ESCITALOPRAM OXALATE 10 MG/1
10 TABLET ORAL DAILY
Qty: 90 TABLET | Refills: 3 | Status: SHIPPED | OUTPATIENT
Start: 2024-09-30

## 2024-09-30 RX ORDER — SIMVASTATIN 20 MG
20 TABLET ORAL DAILY
Qty: 90 TABLET | Refills: 3 | Status: SHIPPED | OUTPATIENT
Start: 2024-09-30

## 2024-09-30 RX ORDER — LISINOPRIL 10 MG/1
10 TABLET ORAL DAILY
Qty: 90 TABLET | Refills: 3 | Status: SHIPPED | OUTPATIENT
Start: 2024-09-30

## 2024-09-30 ASSESSMENT — PATIENT HEALTH QUESTIONNAIRE - PHQ9
SUM OF ALL RESPONSES TO PHQ QUESTIONS 1-9: 1
SUM OF ALL RESPONSES TO PHQ QUESTIONS 1-9: 1
10. IF YOU CHECKED OFF ANY PROBLEMS, HOW DIFFICULT HAVE THESE PROBLEMS MADE IT FOR YOU TO DO YOUR WORK, TAKE CARE OF THINGS AT HOME, OR GET ALONG WITH OTHER PEOPLE: NOT DIFFICULT AT ALL

## 2024-09-30 NOTE — PROGRESS NOTES
Assessment & Plan     Essential hypertension, benign  BP a bit higher than usual today. Check labs and refill lisinopril. No change in dose. Do NOT want to have her get light-headed again as higher fall risk  - BASIC METABOLIC PANEL; Future  - lisinopril (ZESTRIL) 10 MG tablet; Take 1 tablet (10 mg) by mouth daily.    Familial hypercholesterolemia  Stable with excellent lipids on simvastatin. Recheck lipids and no change in dose.  - Lipid panel reflex to direct LDL Non-fasting; Future  - simvastatin (ZOCOR) 20 MG tablet; Take 1 tablet (20 mg) by mouth daily.    Deep vein thrombosis (DVT) of left lower extremity, unspecified chronicity, unspecified vein (H)  Life-long anticoagulation. No bleeding noted.  - rivaroxaban ANTICOAGULANT (XARELTO ANTICOAGULANT) 20 MG TABS tablet; Take 1 tablet (20 mg) by mouth daily (with dinner).    Moderate major depression (H)  Stable on escitalopram without issues. I suspect her looser stools are related to this so may need to change the med if this continues. -escitalopram (LEXAPRO) 10 MG tablet; Take 1 tablet (10 mg) by mouth daily.    Need for vaccination against respiratory syncytial virus  She will get this at local pharmacy.    Balance problems  Ongoing PT.    Personal history of fall  No falls since last visit. Fall precautions discussed and she's getting PT.    Diarrhea, unspecified type  Past mild elevation of fecal calprotectin. If HIGHER would need work-up for colitis. No blood or mucous at present. If all benign, must assume this is a side effect of her selective serotonin reuptake inhibitor and consider a med change.   - CRP, inflammation; Future  - Calprotectin Feces; Future    30 minutes spent on the date of the encounter performing chart review, history and exam, documentation and further activities as noted above.    [place] Davenport MD  Primary Care Center  [facility]          BMI  Estimated body mass index is 26.29 kg/m  as calculated from the following:     "Height as of this encounter: 1.651 m (5' 5\").    Weight as of this encounter: 71.7 kg (158 lb).     Return in about 6 months (around 3/30/2025).    Keke Church is a 89 year old, presenting for the following health issues:  Follow Up (Pt would like to discuss back pain and high cholesterol)      9/30/2024    12:06 PM   Additional Questions   Roomed by Steff PALMER   Accompanied by N/A     History of Present Illness       Back Pain:  She presents for follow up of back pain. Patient's back pain is a recurring problem.  Location of back pain:  Left buttock  Description of back pain: dull ache  Back pain spreads: nowhere    Since patient first noticed back pain, pain is: gradually worsening  Does back pain interfere with her job:  No       Hyperlipidemia:  She presents for follow up of hyperlipidemia.   She is taking medication to lower cholesterol. She is not having myalgia or other side effects to statin medications.    She eats 2-3 servings of fruits and vegetables daily.She consumes 0 sweetened beverage(s) daily.She exercises with enough effort to increase her heart rate 20 to 29 minutes per day.  She exercises with enough effort to increase her heart rate 4 days per week.   She is taking medications regularly.         Review of Systems  Constitutional, HEENT, cardiovascular, pulmonary, gi and gu systems are negative, except as otherwise noted.      Objective    BP (!) 146/64 (BP Location: Right arm, Patient Position: Sitting, Cuff Size: Adult Regular)   Pulse 72   Ht 1.651 m (5' 5\")   Wt 71.7 kg (158 lb)   SpO2 98%   BMI 26.29 kg/m    Body mass index is 26.29 kg/m .  Physical Exam   GENERAL: alert and no distress  EYES: Eyes grossly normal to inspection, PERRL and conjunctivae and sclerae normal  NECK: no adenopathy, no asymmetry, masses, or scars  RESP: lungs clear to auscultation - no rales, rhonchi or wheezes  CV: regular rate and rhythm, normal S1 S2, no S3 or S4, no murmur, click or rub, no " peripheral edema  ABDOMEN: soft, nontender, no hepatosplenomegaly, no masses and bowel sounds normal  MS: no gross musculoskeletal defects noted, no edema  SKIN: no suspicious lesions or rashes  NEURO: Normal strength and tone, mentation intact and speech normal  BACK: no CVA tenderness, no paralumbar tenderness  PSYCH: mentation appears normal, affect normal/bright            Signed Electronically by: Nickolas Davenport MD

## 2024-10-03 ENCOUNTER — TELEPHONE (OUTPATIENT)
Dept: INTERNAL MEDICINE | Facility: CLINIC | Age: 89
End: 2024-10-03
Payer: MEDICARE

## 2024-10-03 NOTE — TELEPHONE ENCOUNTER
[facility] Call Center    Phone Message    May a detailed message be left on voicemail: yes     Reason for Call: Other: patient states she'll be leaving a stool sample after she get's back from her trip which will be late October.

## 2024-10-25 ENCOUNTER — TELEPHONE (OUTPATIENT)
Dept: INTERNAL MEDICINE | Facility: CLINIC | Age: 89
End: 2024-10-25
Payer: MEDICARE

## 2024-10-25 DIAGNOSIS — R19.4 CHANGE IN BOWEL HABIT: Primary | ICD-10-CM

## 2024-10-25 NOTE — TELEPHONE ENCOUNTER
The patient does not use MyChart, so I called her directly. I informed her of the results and recommendations. She is still experiencing the same diarrhea and will follow Dr. Davenport's directions for the next steps.    Soon-Mi  -------------------------------------------------------------------------------------      Hi Ms. Joseyanna,  Here are yoru lab results. You still have a higher CRP (marker of inflammation) than is normal and I'm concerned it is related to your diarrhea. If the diarrhea continues, we'll need to go ahead with the scope from below.  North

## 2024-10-28 NOTE — PROGRESS NOTES
PHYSICAL THERAPY EVALUATION  Type of Visit: Evaluation    Fall Risk Screen:  Fall screen completed by: PT  Have you fallen 2 or more times in the past year?: No  Have you fallen and had an injury in the past year?: No  Is patient a fall risk?: Yes  Fall screen comments: pt has appropriate AD and is aware of falls risk    Subjective       Presenting condition or subjective complaint:    Date of onset: 09/26/24    Relevant medical history: (Patient-Rptd) Depression   Dates & types of surgery:      Prior diagnostic imaging/testing results:       Prior therapy history for the same diagnosis, illness or injury: (Patient-Rptd) No      Prior Level of Function  Transfers:   Ambulation:   ADL:   IADL:     Living Environment  Social support: (Patient-Rptd) Alone   Type of home: (Patient-Rptd) Apartment/condo   Stairs to enter the home: (Patient-Rptd) No       Ramp: (Patient-Rptd) No   Stairs inside the home: (Patient-Rptd) No       Help at home: (Patient-Rptd) None  Equipment owned: (Patient-Rptd) Crutches     Employment: (Patient-Rptd) No    Hobbies/Interests:      Patient goals for therapy:      Pain assessment:     Exercise: 2 per week for 20 min of seated exercise with weights, chair yoga, pedaling, walking 20 min x 2 per day most days     Objective   LUMBAR SPINE EVALUATION  PAIN:   INTEGUMENTARY (edema, incisions):   POSTURE:   GAIT:   Weightbearing Status: WBAT  Assistive Device(s):  hiking poles  Gait Deviations: Base of support increased  Stance time decreased  Stride length decreased  Cayla decreased  BALANCE/PROPRIOCEPTION:   WEIGHTBEARING ALIGNMENT:   NON-WEIGHTBEARING ALIGNMENT:    ROM:   (Degrees) Left AROM Left PROM  Right AROM Right PROM   Hip Flexion       Hip Extension       Hip Abduction       Hip Adduction       Hip Internal Rotation       Hip External Rotation       Knee Flexion       Knee Extension       Lumbar Side glide     Lumbar Flexion    Lumbar Extension Severe with pain   Pain:   End feel:    PELVIC/SI SCREEN:   STRENGTH:     MYOTOMES:   DTR S:   CORD SIGNS:   DERMATOMES:   NEURAL TENSION:   FLEXIBILITY:   LUMBAR/HIP Special Tests:    PELVIS/SI SPECIAL TESTS:   FUNCTIONAL TESTS:   PALPATION:  not tender to palpation of lower thoracic paraspinals, lumbar paraspinals, and posterior hip musculature  SPINAL SEGMENTAL CONCLUSIONS:       Assessment & Plan   CLINICAL IMPRESSIONS  Medical Diagnosis: Chronic left-sided low back pain without sciatica  Left buttock pain  Balance problems    Treatment Diagnosis: Chronic left-sided low back pain without sciatica Left buttock pain Balance problems   Impression/Assessment: Patient is a 89 year old female with left sided low back pain and balance impairments. Pt reports her low back pain has mostly resolved and is not interesting in attending PT for her balance at this time. Has been to PT several times in the past to work on balance. She has a good workout routine in place and provider added 2 additional exercises to her HEP.  The following significant findings have been identified: Decreased ROM/flexibility, Decreased strength, Impaired balance, Impaired gait, and Decreased activity tolerance. These impairments interfere with their ability to perform self care tasks, household mobility, and community mobility as compared to previous level of function.     Clinical Decision Making (Complexity):  Clinical Presentation: Stable/Uncomplicated  Clinical Presentation Rationale: based on medical and personal factors listed in PT evaluation  Clinical Decision Making (Complexity): Low complexity    PLAN OF CARE  Treatment Interventions:  Interventions: Gait Training, Manual Therapy, Neuromuscular Re-education, Therapeutic Activity, Therapeutic Exercise, Self-Care/Home Management    Long Term Goals     PT Goal 1  Goal Identifier: HEP  Goal Description: Pt will be independent in HEP to address abdominal/hip strengthening, aerobic endurance exercises to improve balance and  reduce flare ups in low back pain  Target Date: 10/29/24  Date Met: 10/29/24      Frequency of Treatment: 1 visit  Duration of Treatment:      Recommended Referrals to Other Professionals:   Education Assessment:   Learner/Method: Patient;Pictures/Video    Risks and benefits of evaluation/treatment have been explained.   Patient/Family/caregiver agrees with Plan of Care.     Evaluation Time:     PT Eval, Low Complexity Minutes (41005): 18       Signing Clinician: Sylvia Larry, PT        [facility]                                                                                   OUTPATIENT PHYSICAL THERAPY      Pt reports low back pain is mostly resolved and not interested in attending PT for her balance at this time.    PLAN OF TREATMENT FOR OUTPATIENT REHABILITATION   Patient's Last Name, First Name, Lynnette Dumont YOB: 1935   Provider's Name   [facility]   Medical Record No.  [id number]     Onset Date: 09/26/24  Start of Care Date: 10/29/24     Medical Diagnosis:  Chronic left-sided low back pain without sciatica  Left buttock pain  Balance problems      PT Treatment Diagnosis:  Chronic left-sided low back pain without sciatica Left buttock pain Balance problems Plan of Treatment  Frequency/Duration: 1 visit/      Certification date from 10/29/24 to 10/29/24         See note for plan of treatment details and functional goals     Sylvia Larry, PT                         I CERTIFY THE NEED FOR THESE SERVICES FURNISHED UNDER        THIS PLAN OF TREATMENT AND WHILE UNDER MY CARE     (Physician attestation of this document indicates review and certification of the therapy plan).              Referring Provider:  Candace Dwyer    Initial Assessment  See Epic Evaluation- Start of Care Date: 10/29/24

## 2024-10-29 ENCOUNTER — THERAPY VISIT (OUTPATIENT)
Dept: PHYSICAL THERAPY | Facility: REHABILITATION | Age: 89
End: 2024-10-29
Attending: NURSE PRACTITIONER
Payer: MEDICARE

## 2024-10-29 DIAGNOSIS — M54.50 CHRONIC LEFT-SIDED LOW BACK PAIN WITHOUT SCIATICA: ICD-10-CM

## 2024-10-29 DIAGNOSIS — M79.18 LEFT BUTTOCK PAIN: ICD-10-CM

## 2024-10-29 DIAGNOSIS — R26.89 BALANCE PROBLEMS: ICD-10-CM

## 2024-10-29 DIAGNOSIS — G89.29 CHRONIC LEFT-SIDED LOW BACK PAIN WITHOUT SCIATICA: ICD-10-CM

## 2024-10-29 PROCEDURE — 97110 THERAPEUTIC EXERCISES: CPT | Mod: GP | Performed by: PHYSICAL THERAPIST

## 2024-10-29 PROCEDURE — 97161 PT EVAL LOW COMPLEX 20 MIN: CPT | Mod: GP | Performed by: PHYSICAL THERAPIST

## 2025-02-04 ENCOUNTER — OFFICE VISIT (OUTPATIENT)
Dept: ORTHOPEDICS | Facility: CLINIC | Age: OVER 89
End: 2025-02-04
Payer: MEDICARE

## 2025-02-04 DIAGNOSIS — I73.9 PVD (PERIPHERAL VASCULAR DISEASE): ICD-10-CM

## 2025-02-04 DIAGNOSIS — B35.1 ONYCHOMYCOSIS: Primary | ICD-10-CM

## 2025-02-04 DIAGNOSIS — I73.89 OTHER SPECIFIED PERIPHERAL VASCULAR DISEASES: ICD-10-CM

## 2025-02-04 PROCEDURE — 11720 DEBRIDE NAIL 1-5: CPT | Performed by: PODIATRIST

## 2025-02-04 PROCEDURE — 99207 PR DROP WITH A PROCEDURE: CPT | Performed by: PODIATRIST

## 2025-02-04 NOTE — LETTER
2/4/2025      Lynnette Mata  [address]  [facility] [address]      Dear Colleague,    Thank you for referring your patient, Lynnette Mata, to the [facility] [place]. Please see a copy of my visit note below.    Chief Complaint:   Chief Complaint   Patient presents with     RECHECK     recheck (B) big toes for toenail fungus            No Known Allergies      Subjective: Lynnette is a 89 year old female who presents to the clinic today for a follow up of BL hallux nails. They get long and thick and difficult to trim.        Review of Systems: No n/v/d/f/c/ns/sob/cp  PMH:   Past Medical History        Past Medical History:   Diagnosis Date     Asymptomatic menopausal state age 45     Hx HRT use x 6+ years--off with  study findings     Shingles outbreak 2/14/2013     Despite immunization at age 72!        Stricture and stenosis of cervix       Unspecified constipation       hx chronic            PSxH:   Past Surgical History         Past Surgical History:   Procedure Laterality Date     COLONOSCOPY   4.15.09     (Fiberoptic) WNL -- ami in 10 yrs            Allergies: Patient has no known allergies.     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[employer]- , taaught childrens literature   Tobacco Use     Smoking status: Never     Smokeless tobacco: Never   Substance and Sexual Activity     Alcohol use: Yes       Alcohol/week: 0.0 standard drinks       Comment: 1 d; <3x week / week MAX,      Drug use: No     Sexual activity: Never       Partners: Male       Birth control/protection: Post-menopausal       Comment:  age 45   Other Topics Concern      Service No     Blood Transfusions No      Caffeine Concern No     Occupational Exposure No     Hobby Hazards No     Sleep Concern No     Stress Concern No     Weight Concern No     Special Diet No     Back Care No     Exercise No       Comment: Walks 3x weeks; 30 minutes     Bike Helmet No     Seat Belt No     Self-Exams Not Asked   Social History Narrative     How much exercise per week? Half hour everyday     How much calcium per day? Supplement                                      How much caffeine per day? 1 cup daily     How much vitamin D per day? MVI     Do you/your family wear seatbelts?  Yes     Do you/your family use safety helmets? No     Do you/your family use sunscreen? No     Do you/your family keep firearms in the home? No     Do you/your family have a smoke detector(s)? Yes           Do you feel safe in your home? Yes     Has anyone ever touched you in an unwanted manner? No                  Explain      SEE ARYAN URIOSTEGUI     2/3/2014           Reviewed John KIM CMA05/01/2017     Reviewed John KIM CMA 8/2/2018            Social Determinants of Health      Financial Resource Strain: Not on file   Food Insecurity: Not on file   Transportation Needs: Not on file   Physical Activity: Not on file   Stress: Not on file   Social Connections: Not on file   Intimate Partner Violence: Not on file   Housing Stability: Not on file            FH:   Family History         Family History   Problem Relation Age of Onset     Myocardial Infarction Mother 74     Cerebrovascular Disease Maternal Grandmother 74     Cancer - colorectal Maternal Aunt 68     Endocrine Disease Brother 10         Type I Diabetes Mellitus     Psychotic Disorder Brother           bipolar     C.A.D. Brother       C.A.D. Father       Psychotic Disorder Father 49         suicide     Diabetes Maternal Grandfather 65         DMII     Parkinsonism Other                       Objective:  Data Unavailable Data Unavailable Data Unavailable Data Unavailable Data Unavailable 0 lbs 0 oz     PT  and DP pulses are 1/4 bilaterally. CRT is 1 second. Positive pedal hair. Spider veins, telangectasia, and thin, shiny shin noted. Cold toes.   Gross sensation is intact bilaterally.   Equinus is noted bilaterally. No pain with active or passive ROM of the ankle, MTJ, 1st ray, or halluces bilaterally,.   Nails of bilateral halluces are thickened and mycotic. No open lesions are noted.      Assessment: BL hallux onychomycosis.   Encounter Diagnoses   Name Primary?     Onychomycosis Yes     PVD (peripheral vascular disease)      Other specified peripheral vascular diseases           Plan:  - Pt seen and evaluated.  - Not a good candidate for PO Lamisil 2/2 age. Topicals are largely ineffective at curing mycosis. Recommend that she keep the nails trimmed back.   - Nails debrided x 2.   - See again PRN.       Again, thank you for allowing me to participate in the care of your patient.        Sincerely,        Efrain Barbosa DPM    Electronically signed

## 2025-02-04 NOTE — PROGRESS NOTES
Chief Complaint:   Chief Complaint   Patient presents with    RECHECK     recheck (B) big toes for toenail fungus            No Known Allergies      Subjective: Lynnette is a 89 year old female who presents to the clinic today for a follow up of BL hallux nails. They get long and thick and difficult to trim.        Review of Systems: No n/v/d/f/c/ns/sob/cp  PMH:   Past Medical History        Past Medical History:   Diagnosis Date    Asymptomatic menopausal state age 45     Hx HRT use x 6+ years--off with  study findings    Shingles outbreak 2/14/2013     Despite immunization at age 72!       Stricture and stenosis of cervix      Unspecified constipation       hx chronic            PSxH:   Past Surgical History         Past Surgical History:   Procedure Laterality Date    COLONOSCOPY   4.15.09     (Fiberoptic) WNL -- ami in 10 yrs            Allergies: Patient has no known allergies.     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[employer]   Tobacco Use    Smoking status: Never    Smokeless tobacco: Never   Substance and Sexual Activity    Alcohol use: Yes       Alcohol/week: 0.0 standard drinks       Comment: 1 d; <3x week / week MAX,     Drug use: No    Sexual activity: Never       Partners: Male       Birth control/protection: Post-menopausal       Comment:  age 45   Other Topics Concern     Service No    Blood Transfusions No    Caffeine Concern No    Occupational Exposure No    Hobby Hazards No    Sleep Concern No    Stress Concern No    Weight Concern No    Special Diet No    Back Care No    Exercise No       Comment: Walks 3x weeks; 30 minutes    Bike Helmet No    Seat Belt No    Self-Exams Not  Asked   Social History Narrative     How much exercise per week? Half hour everyday     How much calcium per day? Supplement                                      How much caffeine per day? 1 cup daily     How much vitamin D per day? MVI     Do you/your family wear seatbelts?  Yes     Do you/your family use safety helmets? No     Do you/your family use sunscreen? No     Do you/your family keep firearms in the home? No     Do you/your family have a smoke detector(s)? Yes           Do you feel safe in your home? Yes     Has anyone ever touched you in an unwanted manner? No                  Explain      SEE ARYAN URIOSTEGUI     2/3/2014           Reviewed John KIM CMA05/01/2017     Reviewed John KIM CMA 8/2/2018            Social Determinants of Health      Financial Resource Strain: Not on file   Food Insecurity: Not on file   Transportation Needs: Not on file   Physical Activity: Not on file   Stress: Not on file   Social Connections: Not on file   Intimate Partner Violence: Not on file   Housing Stability: Not on file            FH:   Family History         Family History   Problem Relation Age of Onset    Myocardial Infarction Mother 74    Cerebrovascular Disease Maternal Grandmother 74    Cancer - colorectal Maternal Aunt 68    Endocrine Disease Brother 10         Type I Diabetes Mellitus    Psychotic Disorder Brother           bipolar    C.A.D. Brother      C.A.D. Father      Psychotic Disorder Father 49         suicide    Diabetes Maternal Grandfather 65         DMII    Parkinsonism Other                       Objective:  Data Unavailable Data Unavailable Data Unavailable Data Unavailable Data Unavailable 0 lbs 0 oz     PT and DP pulses are 1/4 bilaterally. CRT is 1 second. Positive pedal hair. Spider veins, telangectasia, and thin, shiny shin noted. Cold toes.   Gross sensation is intact bilaterally.   Equinus is noted bilaterally. No pain with active or passive ROM of the ankle, MTJ, 1st ray, or halluces  bilaterally,.   Nails of bilateral halluces are thickened and mycotic. No open lesions are noted.      Assessment: BL hallux onychomycosis.   Encounter Diagnoses   Name Primary?    Onychomycosis Yes    PVD (peripheral vascular disease)     Other specified peripheral vascular diseases           Plan:  - Pt seen and evaluated.  - Not a good candidate for PO Lamisil 2/2 age. Topicals are largely ineffective at curing mycosis. Recommend that she keep the nails trimmed back.   - Nails debrided x 2.   - See again PRN.

## 2025-03-24 ENCOUNTER — OFFICE VISIT (OUTPATIENT)
Dept: INTERNAL MEDICINE | Facility: CLINIC | Age: OVER 89
End: 2025-03-24
Payer: MEDICARE

## 2025-03-24 VITALS
OXYGEN SATURATION: 95 % | HEIGHT: 65 IN | SYSTOLIC BLOOD PRESSURE: 147 MMHG | HEART RATE: 59 BPM | BODY MASS INDEX: 25.81 KG/M2 | TEMPERATURE: 98 F | RESPIRATION RATE: 18 BRPM | WEIGHT: 154.9 LBS | DIASTOLIC BLOOD PRESSURE: 67 MMHG

## 2025-03-24 DIAGNOSIS — Z91.81 PERSONAL HISTORY OF FALL: ICD-10-CM

## 2025-03-24 DIAGNOSIS — I82.402 DEEP VEIN THROMBOSIS (DVT) OF LEFT LOWER EXTREMITY, UNSPECIFIED CHRONICITY, UNSPECIFIED VEIN (H): ICD-10-CM

## 2025-03-24 DIAGNOSIS — M62.81 GENERALIZED MUSCLE WEAKNESS: ICD-10-CM

## 2025-03-24 DIAGNOSIS — I10 ESSENTIAL HYPERTENSION, BENIGN: Primary | ICD-10-CM

## 2025-03-24 DIAGNOSIS — F32.1 MODERATE MAJOR DEPRESSION (H): ICD-10-CM

## 2025-03-24 DIAGNOSIS — E78.01 FAMILIAL HYPERCHOLESTEROLEMIA: ICD-10-CM

## 2025-03-24 DIAGNOSIS — R19.7 DIARRHEA, UNSPECIFIED TYPE: ICD-10-CM

## 2025-03-24 DIAGNOSIS — R26.89 BALANCE PROBLEMS: ICD-10-CM

## 2025-03-24 PROCEDURE — 99214 OFFICE O/P EST MOD 30 MIN: CPT | Performed by: INTERNAL MEDICINE

## 2025-03-24 PROCEDURE — 3078F DIAST BP <80 MM HG: CPT | Performed by: INTERNAL MEDICINE

## 2025-03-24 PROCEDURE — 3077F SYST BP >= 140 MM HG: CPT | Performed by: INTERNAL MEDICINE

## 2025-03-24 RX ORDER — LISINOPRIL 20 MG/1
20 TABLET ORAL DAILY
Qty: 90 TABLET | Refills: 3 | Status: SHIPPED | OUTPATIENT
Start: 2025-03-24

## 2025-03-24 NOTE — PROGRESS NOTES
"  Assessment & Plan     Essential hypertension, benign  BP remains above target. Increase lisinopril to 20 mg/d. She will finish out her supply of 10 mg tabs by going to [facility]ID until done and watching for side effects (light-headedness). CMP with next visit.   - lisinopril (ZESTRIL) 20 MG tablet; Take 1 tablet (20 mg) by mouth daily.  - **Comprehensive metabolic panel FUTURE 6mo; Future    Familial hypercholesterolemia  No side effects noted from simvastatin. No change. Lipids in 6 mos.  - Lipid panel reflex to direct LDL Fasting; Future    Deep vein thrombosis (DVT) of left lower extremity, unspecified chronicity, unspecified vein (H)  No bleeding on xarelto. No change. Lifelong anticoagulation. No falls.     Moderate major depression (H)  PHQ-9 score of 0 today. No change in escitalopram.     Diarrhea, unspecified type  She reports her symptoms are minimal and was unable to send in a specimen as stools firmed up. Follow clinically.   - **Comprehensive metabolic panel FUTURE 6mo; Future  - **TSH with free T4 reflex FUTURE 6mo; Future    30 minutes spent on the date of the encounter performing chart review, history and exam, documentation and further activities as noted above.    [place] MD  Primary Care Center  [facility]        BMI  Estimated body mass index is 25.78 kg/m  as calculated from the following:    Height as of this encounter: 1.651 m (5' 5\").    Weight as of this encounter: 70.3 kg (154 lb 14.4 oz).     Return in about 6 months (around 9/24/2025).    Keke Church is a 89 year old, presenting for the following health issues:  Follow Up      3/24/2025     2:49 PM   Additional Questions   Roomed by KTR     History of Present Illness       Reason for visit:  Follow up about a bowel problem    She eats 2-3 servings of fruits and vegetables daily.She consumes 0 sweetened beverage(s) daily.She exercises with enough effort to increase her heart rate 30 to 60 minutes per day.  She exercises with " "enough effort to increase her heart rate 5 days per week.   She is taking medications regularly.      Review of Systems  Constitutional, HEENT, cardiovascular, pulmonary, gi and gu systems are negative, except as otherwise noted.      Objective    BP (!) 147/67 (BP Location: Right arm, Patient Position: Sitting, Cuff Size: Adult Regular)   Pulse 59   Temp 98  F (36.7  C) (Oral)   Resp 18   Ht 1.651 m (5' 5\")   Wt 70.3 kg (154 lb 14.4 oz)   SpO2 95%   BMI 25.78 kg/m    Body mass index is 25.78 kg/m .  Physical Exam   GENERAL: alert and no distress  NECK: no adenopathy, no asymmetry, masses, or scars  RESP: lungs clear to auscultation - no rales, rhonchi or wheezes  CV: regular rate and rhythm, normal S1 S2, no S3 or S4, no murmur, click or rub, no peripheral edema  ABDOMEN: soft, nontender, no hepatosplenomegaly, no masses and bowel sounds normal  MS: no gross musculoskeletal defects noted, no edema  SKIN: no suspicious lesions or rashes  NEURO: Normal strength and tone, mentation intact and speech normal  PSYCH: mentation appears normal, affect normal/bright    Lab on 09/30/2024   Component Date Value Ref Range Status    Sodium 09/30/2024 143  135 - 145 mmol/L Final    Potassium 09/30/2024 5.1  3.4 - 5.3 mmol/L Final    Chloride 09/30/2024 114 (H)  98 - 107 mmol/L Final    Carbon Dioxide (CO2) 09/30/2024 20 (L)  22 - 29 mmol/L Final    Anion Gap 09/30/2024 9  7 - 15 mmol/L Final    Urea Nitrogen 09/30/2024 30.3 (H)  8.0 - 23.0 mg/dL Final    Creatinine 09/30/2024 0.87  0.51 - 0.95 mg/dL Final    GFR Estimate 09/30/2024 63  >60 mL/min/1.73m2 Final    eGFR calculated using 2021 CKD-EPI equation.    Calcium 09/30/2024 9.6  8.8 - 10.4 mg/dL Final    Reference intervals for this test were updated on 7/16/2024 to reflect our healthy population more accurately. There may be differences in the flagging of prior results with similar values performed with this method. Those prior results can be interpreted in the " context of the updated reference intervals.    Glucose 09/30/2024 102 (H)  70 - 99 mg/dL Final    Patient Fasting > 8hrs? 09/30/2024 No   Final    Cholesterol 09/30/2024 155  <200 mg/dL Final    Triglycerides 09/30/2024 108  <150 mg/dL Final    Direct Measure HDL 09/30/2024 65  >=50 mg/dL Final    LDL Cholesterol Calculated 09/30/2024 68  <100 mg/dL Final    Non HDL Cholesterol 09/30/2024 90  <130 mg/dL Final    Patient Fasting > 8hrs? 09/30/2024 No   Final    CRP Inflammation 09/30/2024 13.70 (H)  <5.00 mg/L Final           Signed Electronically by: Nickolas Davenport MD

## 2025-03-27 ENCOUNTER — TELEPHONE (OUTPATIENT)
Dept: INTERNAL MEDICINE | Facility: CLINIC | Age: OVER 89
End: 2025-03-27
Payer: MEDICARE

## 2025-03-27 NOTE — TELEPHONE ENCOUNTER
Left Voicemail (1st Attempt) and Sent Mychart (1st Attempt) for the patient to call back and schedule the following:    Appointment type: [facility] RETURN  Provider: PCP  Return date: 6 month follow-up (on or near 9/24/2025)  Specialty phone number: [phone]  Additional appointment(s) needed: -  Additonal Notes: -

## 2025-03-29 ENCOUNTER — HEALTH MAINTENANCE LETTER (OUTPATIENT)
Age: OVER 89
End: 2025-03-29

## 2025-06-21 ENCOUNTER — HEALTH MAINTENANCE LETTER (OUTPATIENT)
Age: OVER 89
End: 2025-06-21

## (undated) DEVICE — TUBING SUCTION 12"X1/4" N612

## (undated) DEVICE — SOL WATER IRRIG 500ML BOTTLE 2F7113

## (undated) DEVICE — GOWN IMPERVIOUS 2XL BLUE

## (undated) DEVICE — SPECIMEN CONTAINER 3OZ W/FORMALIN 59901

## (undated) DEVICE — SNARE CAPIVATOR ROUND COLD SNR BX10 M00561101

## (undated) DEVICE — KIT ENDO TURNOVER/PROCEDURE CARRY-ON 101822

## (undated) DEVICE — SUCTION MANIFOLD NEPTUNE 2 SYS 1 PORT 702-025-000

## (undated) RX ORDER — LIDOCAINE HYDROCHLORIDE 10 MG/ML
INJECTION, SOLUTION EPIDURAL; INFILTRATION; INTRACAUDAL; PERINEURAL
Status: DISPENSED
Start: 2019-08-23

## (undated) RX ORDER — DOBUTAMINE HYDROCHLORIDE 200 MG/100ML
INJECTION INTRAVENOUS
Status: DISPENSED
Start: 2019-06-27

## (undated) RX ORDER — METOPROLOL TARTRATE 1 MG/ML
INJECTION, SOLUTION INTRAVENOUS
Status: DISPENSED
Start: 2019-06-27

## (undated) RX ORDER — ATROPINE SULFATE 0.4 MG/ML
AMPUL (ML) INJECTION
Status: DISPENSED
Start: 2019-06-27

## (undated) RX ORDER — ONDANSETRON 2 MG/ML
INJECTION INTRAMUSCULAR; INTRAVENOUS
Status: DISPENSED
Start: 2023-01-03

## (undated) RX ORDER — FENTANYL CITRATE 50 UG/ML
INJECTION, SOLUTION INTRAMUSCULAR; INTRAVENOUS
Status: DISPENSED
Start: 2023-01-03

## (undated) RX ORDER — DIPHENHYDRAMINE HYDROCHLORIDE 50 MG/ML
INJECTION INTRAMUSCULAR; INTRAVENOUS
Status: DISPENSED
Start: 2023-01-03